# Patient Record
Sex: FEMALE | Race: WHITE | NOT HISPANIC OR LATINO | Employment: OTHER | URBAN - NONMETROPOLITAN AREA
[De-identification: names, ages, dates, MRNs, and addresses within clinical notes are randomized per-mention and may not be internally consistent; named-entity substitution may affect disease eponyms.]

---

## 2017-02-06 ENCOUNTER — APPOINTMENT (OUTPATIENT)
Dept: LAB | Facility: HOSPITAL | Age: 82
End: 2017-02-06
Payer: MEDICARE

## 2017-02-06 ENCOUNTER — TRANSCRIBE ORDERS (OUTPATIENT)
Dept: ADMINISTRATIVE | Facility: HOSPITAL | Age: 82
End: 2017-02-06

## 2017-02-06 DIAGNOSIS — N18.9 CHRONIC KIDNEY DISEASE: ICD-10-CM

## 2017-02-06 DIAGNOSIS — I10 ESSENTIAL (PRIMARY) HYPERTENSION: ICD-10-CM

## 2017-02-06 DIAGNOSIS — J44.9 CHRONIC OBSTRUCTIVE PULMONARY DISEASE (HCC): ICD-10-CM

## 2017-02-06 DIAGNOSIS — E03.9 HYPOTHYROIDISM: ICD-10-CM

## 2017-02-06 DIAGNOSIS — R10.12 LEFT UPPER QUADRANT PAIN: ICD-10-CM

## 2017-02-06 DIAGNOSIS — E87.1 HYPO-OSMOLALITY AND HYPONATREMIA: ICD-10-CM

## 2017-02-06 LAB
ALBUMIN SERPL BCP-MCNC: 3.8 G/DL (ref 3.5–5)
ALP SERPL-CCNC: 53 U/L (ref 46–116)
ALT SERPL W P-5'-P-CCNC: 22 U/L (ref 12–78)
ANION GAP SERPL CALCULATED.3IONS-SCNC: 8 MMOL/L (ref 4–13)
AST SERPL W P-5'-P-CCNC: 26 U/L (ref 5–45)
BASOPHILS # BLD AUTO: 0.05 THOUSANDS/ΜL (ref 0–0.1)
BASOPHILS NFR BLD AUTO: 0 % (ref 0–1)
BILIRUB SERPL-MCNC: 0.5 MG/DL (ref 0.2–1)
BUN SERPL-MCNC: 26 MG/DL (ref 5–25)
CALCIUM SERPL-MCNC: 9.2 MG/DL (ref 8.3–10.1)
CHLORIDE SERPL-SCNC: 93 MMOL/L (ref 100–108)
CHOLEST SERPL-MCNC: 261 MG/DL (ref 50–200)
CO2 SERPL-SCNC: 31 MMOL/L (ref 21–32)
CREAT SERPL-MCNC: 1.18 MG/DL (ref 0.6–1.3)
EOSINOPHIL # BLD AUTO: 0.16 THOUSAND/ΜL (ref 0–0.61)
EOSINOPHIL NFR BLD AUTO: 1 % (ref 0–6)
ERYTHROCYTE [DISTWIDTH] IN BLOOD BY AUTOMATED COUNT: 11.7 % (ref 11.6–15.1)
GFR SERPL CREATININE-BSD FRML MDRD: 43.7 ML/MIN/1.73SQ M
GLUCOSE SERPL-MCNC: 78 MG/DL (ref 65–140)
HCT VFR BLD AUTO: 38.8 % (ref 34.8–46.1)
HDLC SERPL-MCNC: 95 MG/DL (ref 40–60)
HGB BLD-MCNC: 13.2 G/DL (ref 11.5–15.4)
LDLC SERPL CALC-MCNC: 136 MG/DL (ref 0–100)
LYMPHOCYTES # BLD AUTO: 10.52 THOUSANDS/ΜL (ref 0.6–4.47)
LYMPHOCYTES NFR BLD AUTO: 70 % (ref 14–44)
MCH RBC QN AUTO: 30.8 PG (ref 26.8–34.3)
MCHC RBC AUTO-ENTMCNC: 34 G/DL (ref 31.4–37.4)
MCV RBC AUTO: 90 FL (ref 82–98)
MONOCYTES # BLD AUTO: 0.6 THOUSAND/ΜL (ref 0.17–1.22)
MONOCYTES NFR BLD AUTO: 4 % (ref 4–12)
NEUTROPHILS # BLD AUTO: 3.77 THOUSANDS/ΜL (ref 1.85–7.62)
NEUTS SEG NFR BLD AUTO: 25 % (ref 43–75)
PLATELET # BLD AUTO: 235 THOUSANDS/UL (ref 149–390)
PMV BLD AUTO: 11.5 FL (ref 8.9–12.7)
POTASSIUM SERPL-SCNC: 3.9 MMOL/L (ref 3.5–5.3)
PROT SERPL-MCNC: 7 G/DL (ref 6.4–8.2)
RBC # BLD AUTO: 4.29 MILLION/UL (ref 3.81–5.12)
SODIUM SERPL-SCNC: 132 MMOL/L (ref 136–145)
TRIGL SERPL-MCNC: 152 MG/DL
TSH SERPL DL<=0.05 MIU/L-ACNC: 0.74 UIU/ML (ref 0.36–3.74)
WBC # BLD AUTO: 15.1 THOUSAND/UL (ref 4.31–10.16)

## 2017-02-06 PROCEDURE — 80053 COMPREHEN METABOLIC PANEL: CPT

## 2017-02-06 PROCEDURE — 85025 COMPLETE CBC W/AUTO DIFF WBC: CPT

## 2017-02-06 PROCEDURE — 80061 LIPID PANEL: CPT

## 2017-02-06 PROCEDURE — 36415 COLL VENOUS BLD VENIPUNCTURE: CPT

## 2017-02-06 PROCEDURE — 84443 ASSAY THYROID STIM HORMONE: CPT

## 2017-02-07 ENCOUNTER — ALLSCRIPTS OFFICE VISIT (OUTPATIENT)
Dept: OTHER | Facility: OTHER | Age: 82
End: 2017-02-07

## 2017-02-07 DIAGNOSIS — R53.83 OTHER FATIGUE: ICD-10-CM

## 2017-02-07 DIAGNOSIS — N18.9 CHRONIC KIDNEY DISEASE: ICD-10-CM

## 2017-02-07 DIAGNOSIS — E03.9 HYPOTHYROIDISM: ICD-10-CM

## 2017-02-07 DIAGNOSIS — I10 ESSENTIAL (PRIMARY) HYPERTENSION: ICD-10-CM

## 2017-02-07 DIAGNOSIS — M48.00 SPINAL STENOSIS: ICD-10-CM

## 2017-02-07 DIAGNOSIS — M19.90 OSTEOARTHRITIS: ICD-10-CM

## 2017-02-07 DIAGNOSIS — M41.9 SCOLIOSIS: ICD-10-CM

## 2017-02-07 DIAGNOSIS — I27.29 OTHER SECONDARY PULMONARY HYPERTENSION (HCC): ICD-10-CM

## 2017-02-07 DIAGNOSIS — E78.5 HYPERLIPIDEMIA: ICD-10-CM

## 2017-03-23 ENCOUNTER — ALLSCRIPTS OFFICE VISIT (OUTPATIENT)
Dept: OTHER | Facility: OTHER | Age: 82
End: 2017-03-23

## 2017-04-04 ENCOUNTER — APPOINTMENT (OUTPATIENT)
Dept: LAB | Facility: HOSPITAL | Age: 82
End: 2017-04-04
Attending: INTERNAL MEDICINE
Payer: MEDICARE

## 2017-04-04 ENCOUNTER — TRANSCRIBE ORDERS (OUTPATIENT)
Dept: ADMINISTRATIVE | Facility: HOSPITAL | Age: 82
End: 2017-04-04

## 2017-04-04 DIAGNOSIS — C91.10 LEUKEMIA, LYMPHOCYTIC, CHRONIC (HCC): Primary | ICD-10-CM

## 2017-04-04 DIAGNOSIS — C91.10 LEUKEMIA, LYMPHOCYTIC, CHRONIC (HCC): ICD-10-CM

## 2017-04-04 LAB
ALBUMIN SERPL BCP-MCNC: 3.8 G/DL (ref 3.5–5)
ALP SERPL-CCNC: 50 U/L (ref 46–116)
ALT SERPL W P-5'-P-CCNC: 25 U/L (ref 12–78)
ANION GAP SERPL CALCULATED.3IONS-SCNC: 9 MMOL/L (ref 4–13)
AST SERPL W P-5'-P-CCNC: 26 U/L (ref 5–45)
BASOPHILS # BLD MANUAL: 0 THOUSAND/UL (ref 0–0.1)
BASOPHILS NFR MAR MANUAL: 0 % (ref 0–1)
BILIRUB SERPL-MCNC: 0.4 MG/DL (ref 0.2–1)
BUN SERPL-MCNC: 28 MG/DL (ref 5–25)
CALCIUM SERPL-MCNC: 9.3 MG/DL (ref 8.3–10.1)
CHLORIDE SERPL-SCNC: 95 MMOL/L (ref 100–108)
CO2 SERPL-SCNC: 31 MMOL/L (ref 21–32)
CREAT SERPL-MCNC: 1.42 MG/DL (ref 0.6–1.3)
EOSINOPHIL # BLD MANUAL: 0.56 THOUSAND/UL (ref 0–0.4)
EOSINOPHIL NFR BLD MANUAL: 4 % (ref 0–6)
ERYTHROCYTE [DISTWIDTH] IN BLOOD BY AUTOMATED COUNT: 12.1 % (ref 11.6–15.1)
GFR SERPL CREATININE-BSD FRML MDRD: 35.3 ML/MIN/1.73SQ M
GLUCOSE P FAST SERPL-MCNC: 77 MG/DL (ref 65–99)
HCT VFR BLD AUTO: 37.2 % (ref 34.8–46.1)
HGB BLD-MCNC: 12.7 G/DL (ref 11.5–15.4)
LYMPHOCYTES # BLD AUTO: 34 % (ref 14–44)
LYMPHOCYTES # BLD AUTO: 4.77 THOUSAND/UL (ref 0.6–4.47)
MCH RBC QN AUTO: 30.9 PG (ref 26.8–34.3)
MCHC RBC AUTO-ENTMCNC: 34.1 G/DL (ref 31.4–37.4)
MCV RBC AUTO: 91 FL (ref 82–98)
MONOCYTES # BLD AUTO: 0.56 THOUSAND/UL (ref 0–1.22)
MONOCYTES NFR BLD: 4 % (ref 4–12)
NEUTROPHILS # BLD MANUAL: 7.43 THOUSAND/UL (ref 1.85–7.62)
NEUTS SEG NFR BLD AUTO: 53 % (ref 43–75)
PLATELET # BLD AUTO: 219 THOUSANDS/UL (ref 149–390)
PLATELET BLD QL SMEAR: ADEQUATE
PMV BLD AUTO: 11.4 FL (ref 8.9–12.7)
POTASSIUM SERPL-SCNC: 3.8 MMOL/L (ref 3.5–5.3)
PROT SERPL-MCNC: 7 G/DL (ref 6.4–8.2)
RBC # BLD AUTO: 4.11 MILLION/UL (ref 3.81–5.12)
SMUDGE CELLS BLD QL SMEAR: PRESENT
SODIUM SERPL-SCNC: 135 MMOL/L (ref 136–145)
TOTAL CELLS COUNTED SPEC: 100
VARIANT LYMPHS # BLD AUTO: 5 %
WBC # BLD AUTO: 14.02 THOUSAND/UL (ref 4.31–10.16)

## 2017-04-04 PROCEDURE — 85007 BL SMEAR W/DIFF WBC COUNT: CPT

## 2017-04-04 PROCEDURE — 80053 COMPREHEN METABOLIC PANEL: CPT

## 2017-04-04 PROCEDURE — 85027 COMPLETE CBC AUTOMATED: CPT

## 2017-04-04 PROCEDURE — 36415 COLL VENOUS BLD VENIPUNCTURE: CPT

## 2017-04-07 ENCOUNTER — ALLSCRIPTS OFFICE VISIT (OUTPATIENT)
Dept: OTHER | Facility: OTHER | Age: 82
End: 2017-04-07

## 2017-04-07 DIAGNOSIS — C91.10 CHRONIC LYMPHOCYTIC LEUKEMIA OF B-CELL TYPE NOT HAVING ACHIEVED REMISSION (HCC): ICD-10-CM

## 2017-04-13 ENCOUNTER — GENERIC CONVERSION - ENCOUNTER (OUTPATIENT)
Dept: OTHER | Facility: OTHER | Age: 82
End: 2017-04-13

## 2017-04-25 ENCOUNTER — GENERIC CONVERSION - ENCOUNTER (OUTPATIENT)
Dept: OTHER | Facility: OTHER | Age: 82
End: 2017-04-25

## 2017-05-08 ENCOUNTER — APPOINTMENT (OUTPATIENT)
Dept: LAB | Facility: HOSPITAL | Age: 82
End: 2017-05-08
Payer: MEDICARE

## 2017-05-08 ENCOUNTER — TRANSCRIBE ORDERS (OUTPATIENT)
Dept: ADMINISTRATIVE | Facility: HOSPITAL | Age: 82
End: 2017-05-08

## 2017-05-08 ENCOUNTER — GENERIC CONVERSION - ENCOUNTER (OUTPATIENT)
Dept: OTHER | Facility: OTHER | Age: 82
End: 2017-05-08

## 2017-05-08 DIAGNOSIS — N18.9 CHRONIC KIDNEY DISEASE: ICD-10-CM

## 2017-05-08 DIAGNOSIS — E78.5 HYPERLIPIDEMIA: ICD-10-CM

## 2017-05-08 DIAGNOSIS — E03.9 HYPOTHYROIDISM: ICD-10-CM

## 2017-05-08 DIAGNOSIS — I10 ESSENTIAL (PRIMARY) HYPERTENSION: ICD-10-CM

## 2017-05-08 DIAGNOSIS — R53.83 OTHER FATIGUE: ICD-10-CM

## 2017-05-08 DIAGNOSIS — M19.90 OSTEOARTHRITIS: ICD-10-CM

## 2017-05-08 DIAGNOSIS — M48.00 SPINAL STENOSIS: ICD-10-CM

## 2017-05-08 DIAGNOSIS — I27.29 OTHER SECONDARY PULMONARY HYPERTENSION (HCC): ICD-10-CM

## 2017-05-08 DIAGNOSIS — M41.9 SCOLIOSIS: ICD-10-CM

## 2017-05-08 LAB
ALBUMIN SERPL BCP-MCNC: 4 G/DL (ref 3.5–5)
ALP SERPL-CCNC: 51 U/L (ref 46–116)
ALT SERPL W P-5'-P-CCNC: 23 U/L (ref 12–78)
ANION GAP SERPL CALCULATED.3IONS-SCNC: 8 MMOL/L (ref 4–13)
AST SERPL W P-5'-P-CCNC: 26 U/L (ref 5–45)
BASOPHILS # BLD MANUAL: 0 THOUSAND/UL (ref 0–0.1)
BASOPHILS NFR MAR MANUAL: 0 % (ref 0–1)
BILIRUB SERPL-MCNC: 0.5 MG/DL (ref 0.2–1)
BUN SERPL-MCNC: 24 MG/DL (ref 5–25)
CALCIUM SERPL-MCNC: 9.3 MG/DL (ref 8.3–10.1)
CHLORIDE SERPL-SCNC: 92 MMOL/L (ref 100–108)
CHOLEST SERPL-MCNC: 256 MG/DL (ref 50–200)
CO2 SERPL-SCNC: 31 MMOL/L (ref 21–32)
CREAT SERPL-MCNC: 1.17 MG/DL (ref 0.6–1.3)
EOSINOPHIL # BLD MANUAL: 0.31 THOUSAND/UL (ref 0–0.4)
EOSINOPHIL NFR BLD MANUAL: 2 % (ref 0–6)
ERYTHROCYTE [DISTWIDTH] IN BLOOD BY AUTOMATED COUNT: 12.1 % (ref 11.6–15.1)
GFR SERPL CREATININE-BSD FRML MDRD: 44.2 ML/MIN/1.73SQ M
GLUCOSE P FAST SERPL-MCNC: 78 MG/DL (ref 65–99)
HCT VFR BLD AUTO: 38.7 % (ref 34.8–46.1)
HDLC SERPL-MCNC: 91 MG/DL (ref 40–60)
HGB BLD-MCNC: 12.7 G/DL (ref 11.5–15.4)
LDLC SERPL CALC-MCNC: 118 MG/DL (ref 0–100)
LYMPHOCYTES # BLD AUTO: 51 % (ref 14–44)
LYMPHOCYTES # BLD AUTO: 7.92 THOUSAND/UL (ref 0.6–4.47)
MCH RBC QN AUTO: 29.7 PG (ref 26.8–34.3)
MCHC RBC AUTO-ENTMCNC: 32.8 G/DL (ref 31.4–37.4)
MCV RBC AUTO: 90 FL (ref 82–98)
MONOCYTES # BLD AUTO: 0.62 THOUSAND/UL (ref 0–1.22)
MONOCYTES NFR BLD: 4 % (ref 4–12)
NEUTROPHILS # BLD MANUAL: 5.74 THOUSAND/UL (ref 1.85–7.62)
NEUTS SEG NFR BLD AUTO: 37 % (ref 43–75)
PLATELET # BLD AUTO: 254 THOUSANDS/UL (ref 149–390)
PLATELET BLD QL SMEAR: ADEQUATE
PMV BLD AUTO: 11.3 FL (ref 8.9–12.7)
POTASSIUM SERPL-SCNC: 3.4 MMOL/L (ref 3.5–5.3)
PROT SERPL-MCNC: 7.3 G/DL (ref 6.4–8.2)
RBC # BLD AUTO: 4.28 MILLION/UL (ref 3.81–5.12)
SMUDGE CELLS BLD QL SMEAR: PRESENT
SODIUM SERPL-SCNC: 131 MMOL/L (ref 136–145)
TOTAL CELLS COUNTED SPEC: 100
TRIGL SERPL-MCNC: 234 MG/DL
TSH SERPL DL<=0.05 MIU/L-ACNC: 0.8 UIU/ML (ref 0.36–3.74)
VARIANT LYMPHS # BLD AUTO: 6 %
WBC # BLD AUTO: 15.52 THOUSAND/UL (ref 4.31–10.16)

## 2017-05-08 PROCEDURE — 84443 ASSAY THYROID STIM HORMONE: CPT

## 2017-05-08 PROCEDURE — 80053 COMPREHEN METABOLIC PANEL: CPT

## 2017-05-08 PROCEDURE — 85027 COMPLETE CBC AUTOMATED: CPT

## 2017-05-08 PROCEDURE — 80061 LIPID PANEL: CPT

## 2017-05-08 PROCEDURE — 85007 BL SMEAR W/DIFF WBC COUNT: CPT

## 2017-05-08 PROCEDURE — 36415 COLL VENOUS BLD VENIPUNCTURE: CPT

## 2017-05-09 ENCOUNTER — ALLSCRIPTS OFFICE VISIT (OUTPATIENT)
Dept: OTHER | Facility: OTHER | Age: 82
End: 2017-05-09

## 2017-05-09 DIAGNOSIS — N18.9 CHRONIC KIDNEY DISEASE: ICD-10-CM

## 2017-05-09 DIAGNOSIS — M48.061 SPINAL STENOSIS OF LUMBAR REGION: ICD-10-CM

## 2017-05-09 DIAGNOSIS — E87.6 HYPOKALEMIA: ICD-10-CM

## 2017-05-09 DIAGNOSIS — E03.9 HYPOTHYROIDISM: ICD-10-CM

## 2017-05-09 DIAGNOSIS — M47.812 SPONDYLOSIS OF CERVICAL REGION WITHOUT MYELOPATHY OR RADICULOPATHY: ICD-10-CM

## 2017-05-09 DIAGNOSIS — I27.29 OTHER SECONDARY PULMONARY HYPERTENSION (HCC): ICD-10-CM

## 2017-05-09 DIAGNOSIS — I10 ESSENTIAL (PRIMARY) HYPERTENSION: ICD-10-CM

## 2017-05-09 DIAGNOSIS — E78.5 HYPERLIPIDEMIA: ICD-10-CM

## 2017-05-09 DIAGNOSIS — I08.3 COMBINED RHEUMATIC DISORDERS OF MITRAL, AORTIC AND TRICUSPID VALVES: ICD-10-CM

## 2017-06-05 ENCOUNTER — HOSPITAL ENCOUNTER (OUTPATIENT)
Dept: NON INVASIVE DIAGNOSTICS | Facility: HOSPITAL | Age: 82
Discharge: HOME | End: 2017-06-05
Attending: INTERNAL MEDICINE
Payer: MEDICARE

## 2017-06-05 ENCOUNTER — GENERIC CONVERSION - ENCOUNTER (OUTPATIENT)
Dept: OTHER | Facility: OTHER | Age: 82
End: 2017-06-05

## 2017-06-05 DIAGNOSIS — I08.3 COMBINED RHEUMATIC DISORDERS OF MITRAL, AORTIC AND TRICUSPID VALVES: ICD-10-CM

## 2017-06-05 DIAGNOSIS — E78.5 HYPERLIPIDEMIA: ICD-10-CM

## 2017-06-05 DIAGNOSIS — I27.29 OTHER SECONDARY PULMONARY HYPERTENSION (HCC): ICD-10-CM

## 2017-06-05 PROCEDURE — 93306 TTE W/DOPPLER COMPLETE: CPT

## 2017-06-21 ENCOUNTER — GENERIC CONVERSION - ENCOUNTER (OUTPATIENT)
Dept: OTHER | Facility: OTHER | Age: 82
End: 2017-06-21

## 2017-06-30 ENCOUNTER — GENERIC CONVERSION - ENCOUNTER (OUTPATIENT)
Dept: OTHER | Facility: OTHER | Age: 82
End: 2017-06-30

## 2017-07-13 ENCOUNTER — GENERIC CONVERSION - ENCOUNTER (OUTPATIENT)
Dept: OTHER | Facility: OTHER | Age: 82
End: 2017-07-13

## 2017-07-17 ENCOUNTER — GENERIC CONVERSION - ENCOUNTER (OUTPATIENT)
Dept: OTHER | Facility: OTHER | Age: 82
End: 2017-07-17

## 2017-08-09 ENCOUNTER — TRANSCRIBE ORDERS (OUTPATIENT)
Dept: ADMINISTRATIVE | Facility: HOSPITAL | Age: 82
End: 2017-08-09

## 2017-08-09 ENCOUNTER — APPOINTMENT (OUTPATIENT)
Dept: LAB | Facility: HOSPITAL | Age: 82
End: 2017-08-09
Payer: MEDICARE

## 2017-08-09 DIAGNOSIS — E78.5 HYPERLIPIDEMIA: ICD-10-CM

## 2017-08-09 DIAGNOSIS — E03.9 HYPOTHYROIDISM: ICD-10-CM

## 2017-08-09 DIAGNOSIS — M48.061 SPINAL STENOSIS OF LUMBAR REGION: ICD-10-CM

## 2017-08-09 DIAGNOSIS — E87.6 HYPOKALEMIA: ICD-10-CM

## 2017-08-09 DIAGNOSIS — I10 ESSENTIAL (PRIMARY) HYPERTENSION: ICD-10-CM

## 2017-08-09 DIAGNOSIS — M47.812 SPONDYLOSIS OF CERVICAL REGION WITHOUT MYELOPATHY OR RADICULOPATHY: ICD-10-CM

## 2017-08-09 DIAGNOSIS — N18.9 CHRONIC KIDNEY DISEASE: ICD-10-CM

## 2017-08-09 LAB
ALBUMIN SERPL BCP-MCNC: 3.7 G/DL (ref 3.5–5)
ALP SERPL-CCNC: 49 U/L (ref 46–116)
ALT SERPL W P-5'-P-CCNC: 26 U/L (ref 12–78)
ANION GAP SERPL CALCULATED.3IONS-SCNC: 9 MMOL/L (ref 4–13)
AST SERPL W P-5'-P-CCNC: 28 U/L (ref 5–45)
BASOPHILS # BLD MANUAL: 0 THOUSAND/UL (ref 0–0.1)
BASOPHILS NFR MAR MANUAL: 0 % (ref 0–1)
BILIRUB SERPL-MCNC: 0.4 MG/DL (ref 0.2–1)
BUN SERPL-MCNC: 35 MG/DL (ref 5–25)
CALCIUM SERPL-MCNC: 9.4 MG/DL (ref 8.3–10.1)
CHLORIDE SERPL-SCNC: 95 MMOL/L (ref 100–108)
CHOLEST SERPL-MCNC: 252 MG/DL (ref 50–200)
CO2 SERPL-SCNC: 29 MMOL/L (ref 21–32)
CREAT SERPL-MCNC: 1.26 MG/DL (ref 0.6–1.3)
EOSINOPHIL # BLD MANUAL: 0 THOUSAND/UL (ref 0–0.4)
EOSINOPHIL NFR BLD MANUAL: 0 % (ref 0–6)
ERYTHROCYTE [DISTWIDTH] IN BLOOD BY AUTOMATED COUNT: 12.4 % (ref 11.6–15.1)
GFR SERPL CREATININE-BSD FRML MDRD: 39 ML/MIN/1.73SQ M
GLUCOSE P FAST SERPL-MCNC: 71 MG/DL (ref 65–99)
HCT VFR BLD AUTO: 36.9 % (ref 34.8–46.1)
HDLC SERPL-MCNC: 90 MG/DL (ref 40–60)
HGB BLD-MCNC: 12.5 G/DL (ref 11.5–15.4)
LDLC SERPL CALC-MCNC: 126 MG/DL (ref 0–100)
LYMPHOCYTES # BLD AUTO: 49 % (ref 14–44)
LYMPHOCYTES # BLD AUTO: 6.56 THOUSAND/UL (ref 0.6–4.47)
MCH RBC QN AUTO: 30.5 PG (ref 26.8–34.3)
MCHC RBC AUTO-ENTMCNC: 33.9 G/DL (ref 31.4–37.4)
MCV RBC AUTO: 90 FL (ref 82–98)
MONOCYTES # BLD AUTO: 0.54 THOUSAND/UL (ref 0–1.22)
MONOCYTES NFR BLD: 4 % (ref 4–12)
NEUTROPHILS # BLD MANUAL: 6.29 THOUSAND/UL (ref 1.85–7.62)
NEUTS SEG NFR BLD AUTO: 47 % (ref 43–75)
PLATELET # BLD AUTO: 243 THOUSANDS/UL (ref 149–390)
PLATELET BLD QL SMEAR: ADEQUATE
PMV BLD AUTO: 11.2 FL (ref 8.9–12.7)
POTASSIUM SERPL-SCNC: 4.2 MMOL/L (ref 3.5–5.3)
PROT SERPL-MCNC: 7.2 G/DL (ref 6.4–8.2)
RBC # BLD AUTO: 4.1 MILLION/UL (ref 3.81–5.12)
SMUDGE CELLS BLD QL SMEAR: PRESENT
SODIUM SERPL-SCNC: 133 MMOL/L (ref 136–145)
TOTAL CELLS COUNTED SPEC: 100
TRIGL SERPL-MCNC: 178 MG/DL
TSH SERPL DL<=0.05 MIU/L-ACNC: 0.64 UIU/ML (ref 0.36–3.74)
WBC # BLD AUTO: 13.38 THOUSAND/UL (ref 4.31–10.16)

## 2017-08-09 PROCEDURE — 84443 ASSAY THYROID STIM HORMONE: CPT

## 2017-08-09 PROCEDURE — 80061 LIPID PANEL: CPT

## 2017-08-09 PROCEDURE — 80053 COMPREHEN METABOLIC PANEL: CPT

## 2017-08-09 PROCEDURE — 85027 COMPLETE CBC AUTOMATED: CPT

## 2017-08-09 PROCEDURE — 85007 BL SMEAR W/DIFF WBC COUNT: CPT

## 2017-08-09 PROCEDURE — 36415 COLL VENOUS BLD VENIPUNCTURE: CPT

## 2017-08-10 ENCOUNTER — GENERIC CONVERSION - ENCOUNTER (OUTPATIENT)
Dept: OTHER | Facility: OTHER | Age: 82
End: 2017-08-10

## 2017-08-10 DIAGNOSIS — E78.5 HYPERLIPIDEMIA: ICD-10-CM

## 2017-08-10 DIAGNOSIS — I10 ESSENTIAL (PRIMARY) HYPERTENSION: ICD-10-CM

## 2017-08-10 DIAGNOSIS — I27.29 OTHER SECONDARY PULMONARY HYPERTENSION (HCC): ICD-10-CM

## 2017-08-10 DIAGNOSIS — J44.9 CHRONIC OBSTRUCTIVE PULMONARY DISEASE (HCC): ICD-10-CM

## 2017-08-10 DIAGNOSIS — N18.9 CHRONIC KIDNEY DISEASE: ICD-10-CM

## 2017-09-21 ENCOUNTER — ALLSCRIPTS OFFICE VISIT (OUTPATIENT)
Dept: OTHER | Facility: OTHER | Age: 82
End: 2017-09-21

## 2017-10-04 ENCOUNTER — APPOINTMENT (OUTPATIENT)
Dept: LAB | Facility: HOSPITAL | Age: 82
End: 2017-10-04
Attending: INTERNAL MEDICINE
Payer: MEDICARE

## 2017-10-04 ENCOUNTER — TRANSCRIBE ORDERS (OUTPATIENT)
Dept: ADMINISTRATIVE | Facility: HOSPITAL | Age: 82
End: 2017-10-04

## 2017-10-04 DIAGNOSIS — C91.10 CHRONIC LYMPHOCYTIC LEUKEMIA OF B-CELL TYPE NOT HAVING ACHIEVED REMISSION (HCC): ICD-10-CM

## 2017-10-04 LAB
ALBUMIN SERPL BCP-MCNC: 3.9 G/DL (ref 3.5–5)
ALP SERPL-CCNC: 56 U/L (ref 46–116)
ALT SERPL W P-5'-P-CCNC: 24 U/L (ref 12–78)
ANION GAP SERPL CALCULATED.3IONS-SCNC: 11 MMOL/L (ref 4–13)
ANISOCYTOSIS BLD QL SMEAR: PRESENT
AST SERPL W P-5'-P-CCNC: 30 U/L (ref 5–45)
BASOPHILS # BLD MANUAL: 0 THOUSAND/UL (ref 0–0.1)
BASOPHILS NFR MAR MANUAL: 0 % (ref 0–1)
BILIRUB SERPL-MCNC: 0.4 MG/DL (ref 0.2–1)
BUN SERPL-MCNC: 23 MG/DL (ref 5–25)
CALCIUM SERPL-MCNC: 9.3 MG/DL (ref 8.3–10.1)
CHLORIDE SERPL-SCNC: 91 MMOL/L (ref 100–108)
CO2 SERPL-SCNC: 28 MMOL/L (ref 21–32)
CREAT SERPL-MCNC: 1.1 MG/DL (ref 0.6–1.3)
EOSINOPHIL # BLD MANUAL: 0.14 THOUSAND/UL (ref 0–0.4)
EOSINOPHIL NFR BLD MANUAL: 1 % (ref 0–6)
ERYTHROCYTE [DISTWIDTH] IN BLOOD BY AUTOMATED COUNT: 11.9 % (ref 11.6–15.1)
GFR SERPL CREATININE-BSD FRML MDRD: 46 ML/MIN/1.73SQ M
GLUCOSE P FAST SERPL-MCNC: 77 MG/DL (ref 65–99)
HCT VFR BLD AUTO: 38.4 % (ref 34.8–46.1)
HGB BLD-MCNC: 13.1 G/DL (ref 11.5–15.4)
LYMPHOCYTES # BLD AUTO: 39 % (ref 14–44)
LYMPHOCYTES # BLD AUTO: 5.51 THOUSAND/UL (ref 0.6–4.47)
MCH RBC QN AUTO: 30 PG (ref 26.8–34.3)
MCHC RBC AUTO-ENTMCNC: 34.1 G/DL (ref 31.4–37.4)
MCV RBC AUTO: 88 FL (ref 82–98)
MONOCYTES # BLD AUTO: 0.28 THOUSAND/UL (ref 0–1.22)
MONOCYTES NFR BLD: 2 % (ref 4–12)
NEUTROPHILS # BLD MANUAL: 7.07 THOUSAND/UL (ref 1.85–7.62)
NEUTS SEG NFR BLD AUTO: 50 % (ref 43–75)
PLATELET # BLD AUTO: 225 THOUSANDS/UL (ref 149–390)
PLATELET BLD QL SMEAR: ADEQUATE
PMV BLD AUTO: 11.1 FL (ref 8.9–12.7)
POTASSIUM SERPL-SCNC: 4 MMOL/L (ref 3.5–5.3)
PROT SERPL-MCNC: 7.3 G/DL (ref 6.4–8.2)
RBC # BLD AUTO: 4.37 MILLION/UL (ref 3.81–5.12)
SMUDGE CELLS BLD QL SMEAR: PRESENT
SODIUM SERPL-SCNC: 130 MMOL/L (ref 136–145)
TOTAL CELLS COUNTED SPEC: 100
VARIANT LYMPHS # BLD AUTO: 8 %
WBC # BLD AUTO: 14.14 THOUSAND/UL (ref 4.31–10.16)

## 2017-10-04 PROCEDURE — 36415 COLL VENOUS BLD VENIPUNCTURE: CPT

## 2017-10-04 PROCEDURE — 85027 COMPLETE CBC AUTOMATED: CPT

## 2017-10-04 PROCEDURE — 85007 BL SMEAR W/DIFF WBC COUNT: CPT

## 2017-10-04 PROCEDURE — 80053 COMPREHEN METABOLIC PANEL: CPT

## 2017-10-06 ENCOUNTER — ALLSCRIPTS OFFICE VISIT (OUTPATIENT)
Dept: OTHER | Facility: OTHER | Age: 82
End: 2017-10-06

## 2017-10-07 NOTE — PROGRESS NOTES
Assessment  1  Chronic lymphocytic leukemia (204 10) (C91 10)    Plan  Chronic lymphocytic leukemia    · Drink plenty of fluids ; Status:Complete;   Done: [id number]   Ordered; For:Chronic lymphocytic leukemia; Ordered By:Makayla Fuchs;   · Follow-up visit in 6 months Evaluation and Treatment  Follow-up  Status: Hold For -  Scheduling  Requested for: 82QWO2920   Ordered; For: Chronic lymphocytic leukemia; Ordered By: Margarita Lazaro Performed:  Due: 15DRK8076   · (1) CBC/PLT/DIFF; Status:Active; Requested for:30Mar2018; Perform:[facility]; TCO:42QTU8973;TTNZZJA; For:Chronic lymphocytic leukemia; Ordered By:Makayla Fuchs;   · (1) COMPREHENSIVE METABOLIC PANEL; Status:Active; Requested for:30Mar2018; Perform:[facility]; DEF:24JMM8945;GIJAHWR; For:Chronic lymphocytic leukemia; Ordered By:Makayla Fuchs;    Discussion/Summary  Discussion Summary:   In summary, this is an 28-year-old female history of CLL as outlined  she has had some problems related to her pulmonary issues but none evident we related to CLL  and platelets are normal  There is no palpable adenopathy or organomegaly  CLL remains at stage 0  Observation remains appropriate  reviewed the above with the patient and her daughter  They voiced understanding and agreement  I will see her back in 6 months for review  Counseling Documentation With Imm: The patient, patient's family was counseled regarding diagnostic results,-instructions for management,-patient and family education,-impressions  total time of encounter was 15 minutes  Chief Complaint  Chief Complaint Free Text Note Form: Follow-up regarding CLL  History of Present Illness  HPI: 2013- diagnosed with CLL  Mild leukocytosis  13 q- (a favorable prognostic feature) noted  Interval History: Had evaluation regarding pulmonary nodules  Eventually found to have pulmonary HTN, right heart failure  Trial of NTG lead to confusion, DC'd med        Review of Systems  Complete-Female: Constitutional: feeling tired, but-No fever, no chills, feels well, no tiredness, no recent weight gain or weight loss  Eyes: No complaints of eye pain, no red eyes, no eyesight problems, no discharge, no dry eyes, no itching of eyes  ENT: no complaints of earache, no loss of hearing, no nose bleeds, no nasal discharge, no sore throat, no hoarseness  Cardiovascular: No complaints of slow heart rate, no fast heart rate, no chest pain, no palpitations, no leg claudication, no lower extremity edema  Respiratory: shortness of breath during exertion-and-has worsened over past 4-6 months  Gastrointestinal: No complaints of abdominal pain, no constipation, no nausea or vomiting, no diarrhea, no bloody stools  Genitourinary: No complaints of dysuria, no incontinence, no pelvic pain, no dysmenorrhea, no vaginal discharge or bleeding  Musculoskeletal: Chronic back pain for years  RA fluctuates quite a bit  Heels and legs hurt  Wake her from sleep  Integumentary: No complaints of skin rash or lesions, no itching, no skin wounds, no breast pain or lump  Neurological: No complaints of headache, no confusion, no convulsions, no numbness, no dizziness or fainting, no tingling, no limb weakness, no difficulty walking  Psychiatric: Not suicidal, no sleep disturbance, no anxiety or depression, no change in personality, no emotional problems  Endocrine: No complaints of proptosis, no hot flashes, no muscle weakness, no deepening of the voice, no feelings of weakness  Hematologic/Lymphatic: No complaints of swollen glands, no swollen glands in the neck, does not bleed easily, does not bruise easily  Active Problems  1  Arthritis (716 90) (M19 90)   2  Cervical osteoarthritis (721 0) (M47 812)   3  Chronic kidney insufficiency (585 9) (N18 9)   4  Chronic lymphocytic leukemia (204 10) (C91 10)   5  Chronic obstructive pulmonary disease (496) (J44 9)   6   Combined disorders of mitral, 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)   7  Dyslipidemia (272 4) (E78 5)   8  Emphysema (492 8) (J43 9)   9  Esophageal reflux (530 81) (K21 9)   10  Hypertension (401 9) (I10)   11  Hypokalemia (276 8) (E87 6)   12  Hyponatremia (276 1) (E87 1)   13  Hypothyroidism (244 9) (E03 9)   14  Lumbar stenosis (724 02) (M48 061)   15  Medicare annual wellness visit, subsequent (V70 0) (Z00 00)   16  Osteoarthritis (715 90) (M19 90)   17  Premature atrial contraction (427 61) (I49 1)   18  Pulmonary artery hypertension (416 8) (I27 21)   19  Scoliosis (737 30) (M41 9)   20  Screening for osteoporosis (V82 81) (Z13 820)   21  Spinal stenosis (724 00) (M48 00)   22  Trochanteric bursitis of both hips (726 5) (M70 61,M70 62)    Past Medical History  1  History of Carpal tunnel syndrome, unspecified laterality (354 0) (G56 00)   2  History of Cubital tunnel syndrome on left (354 2) (G56 22)   3  History of Cubital tunnel syndrome on right (354 2) (G56 21)   4  History of Ectopic heartbeats (427 60) (I49 49)   5  History of Hemoptysis (786 30) (R04 2)   6  History of Malignant melanoma of skin (172 9) (C43 9)   7  History of Near syncope (780 2) (R55)   8  History of Transient ischemic attack (435 9) (G45 9)    Surgical History  1  History of Appendectomy   2  History of Breast Surgery Lumpectomy   3  History of Cataract Surgery   4  History of  Section   5  History of Fusion / Refusion Of Vertebrae   6  History of Hysterectomy   7  History of Laminectomy Lumbar   8  History of Neuroplasty Decompression Median Nerve At Carpal Tunnel   9  History of Oophorectomy   10  History of Thoracoscopy (Therapeutic) With Pleurodesis   11  History of Tonsillectomy    Family History  Mother    1  Family history of Colon Cancer (V16 0)   2  Family history of Hypertension (V17 49)  Father    3  Family history of Heart Disease (V17 49)   4  Family history of Rheumatoid Arthritis  Paternal Aunt    5   Family history of Diabetes Mellitus (V18 0)  Maternal Uncle    6  Family history of Diabetes Mellitus (V18 0)  Family History    7  Family history of Cancer   8  Family history of Stroke Syndrome (V17 1)    Social History   · Being A Social Drinker   · Never A Smoker   ·     Current Meds   1  ALPRAZolam 0 25 MG Oral Tablet; TAKE 1 TABLET twice a day AS NEEDED; Therapy: 43NZT9154 to (Renew:04Oct2016); Last Rx:28Utn9321 Ordered   2  CeleBREX 200 MG Oral Capsule; take 1 capsule daily; Therapy: (Recorded:22Egy4637) to Recorded   3  Co Q-10 100 MG Oral Capsule; Therapy: (Frances Ashby) to Recorded   4  Cranberry CAPS; Therapy: (Recorded:06Oct2017) to Recorded   5  Levothyroxine Sodium 112 MCG Oral Tablet; Take 1 tablet daily as directed; Therapy: 08BQH4089 to (Last Rx:63Smb0584)  Requested for: 28Noh2848 Ordered   6  Losartan Potassium 50 MG Oral Tablet; TAKE ONE-HALF (1/2) TABLET DAILY; Therapy: 06MQF2282 to (Last Rx:37Fks6495)  Requested for: 09Imr2480 Ordered   7  Montelukast Sodium 10 MG Oral Tablet; Take 1 tablet daily; Therapy: 84JCG3995 to (Last Rx:10Nov2016)  Requested for: 94WMQ2665 Ordered   8  Pataday 0 2 % Ophthalmic Solution; Therapy: 43SJP6242 to Recorded   9  Premarin 1 25 MG Oral Tablet; Take 1 tablet daily; Therapy: 29DHC4463 to (Last Rx:15Sor3742)  Requested for: 52Flg6057 Ordered   10  PriLOSEC 20 MG CPDR; Therapy: 36XQJ7792 to Recorded   11  Triamterene-HCTZ 37 5-25 MG Oral Tablet; Take 1 tablet daily as directed; Therapy: 45Eeo5707 to (Last Rx:35Gac2241)  Requested for: 62Ssg8561 Ordered   12  Vigamox 0 5 % Ophthalmic Solution; Therapy: (Recorded:06Oct2017) to Recorded   13  Vitamin B6 TABS; Therapy: (Recorded:20Nov2013) to Recorded   14  Vitamin C 500 MG Oral Tablet; Therapy: (Recorded:20Nov2013) to Recorded   15  Vitamin D3 2000 UNIT Oral Capsule; Therapy: (Recorded:20Nov2013) to Recorded   16  Vitamin E 400 UNIT Oral Tablet; TAKE 1 TABLET DAILY; Therapy: (Recorded:84Hpp6609) to Recorded   17   Zinc Gluconate 15 MG Oral Tablet; TAKE 1 TABLET DAILY; Therapy: (Recorded:36Rld2699) to Recorded    Allergies  1  Aspirin TABS   2  Ativan TABS   3  Betadine   4  Codeine Derivatives   5  Enbrel SOLN   6  Penicillins   7  Povidone SOLN   8  PredniSONE TABS    Vitals  Vital Signs    Recorded: 93RZA5032 02:00PM   Temperature 97 9 F   Heart Rate 75   Respiration 15   Systolic 030   Diastolic 64   Height 4 ft 10 in   Weight 177 lb 5 oz   BMI Calculated 37 06   BSA Calculated 1 73   O2 Saturation 96   Pain Scale 8     Physical Exam    Constitutional   General appearance: No acute distress, well appearing and well nourished  Eyes   Conjunctiva and lids: No swelling, erythema or discharge  Ears, Nose, Mouth, and Throat   External inspection of ears and nose: Normal     Oropharynx: Normal with no erythema, edema, exudate or lesions  Pulmonary   Auscultation of lungs: Clear to auscultation  Cardiovascular   Auscultation of heart: Normal rate and rhythm, normal S1 and S2, without murmurs  Examination of extremities for edema and/or varicosities: Normal     Abdomen   Abdomen: Non-tender, no masses  Liver and spleen: No hepatomegaly or splenomegaly  Lymphatic   Palpation of lymph nodes in neck: No lymphadenopathy  Musculoskeletal   Gait and station: Normal     Skin   Skin and subcutaneous tissue: Normal without rashes or lesions  Neurologic   Cranial nerves: Cranial nerves 2-12 intact      Psychiatric   Orientation to person, place, and time: Normal          Results/Data  (1) COMPREHENSIVE METABOLIC PANEL 61TNI9387 03:78ZS Juan R WesMarshall Order Number: [id number]     Test Name Result Flag Reference   SODIUM 130 mmol/L L 136-145   POTASSIUM 4 0 mmol/L  3 5-5 3   CHLORIDE 91 mmol/L L 100-108   CARBON DIOXIDE 28 mmol/L  21-32   ANION GAP (CALC) 11 mmol/L  4-13   BLOOD UREA NITROGEN 23 mg/dL  5-25   CREATININE 1 10 mg/dL  0 60-1 30   Standardized to IDMS reference method   CALCIUM 9 3 mg/dL 8  3-10 1   BILI, TOTAL 0 40 mg/dL  0 20-1 00   ALK PHOSPHATAS 56 U/L     ALT (SGPT) 24 U/L  12-78   Specimen collection should occur prior to Sulfasalazine administration due to the potential for falsely depressed results  AST(SGOT) 30 U/L  5-45   Specimen collection should occur prior to Sulfasalazine administration due to the potential for falsely depressed results  ALBUMIN 3 9 g/dL  3 5-5 0   TOTAL PROTEIN 7 3 g/dL  6 4-8 2   eGFR 46 ml/min/1 73sq [facility] Disease Education Program recommendations are as follows:  GFR calculation is accurate only with a steady state creatinine  Chronic Kidney disease less than 60 ml/min/1 73 sq  meters  Kidney failure less than 15 ml/min/1 73 sq  meters  GLUCOSE FASTING 77 mg/dL  65-99   Specimen collection should occur prior to Sulfasalazine administration due to the potential for falsely depressed results  Specimen collection should occur prior to Sulfapyridine administration due to the potential for falsely elevated results  Future Appointments    Date/Time Provider Specialty Site   12/07/2017 01:40 PM GERALD George  [address] [place] [address]     Signatures   Electronically signed by : GERALD Odonnell  Oct  6 2017  2:30PM EST                       (Author)

## 2017-12-04 ENCOUNTER — GENERIC CONVERSION - ENCOUNTER (OUTPATIENT)
Dept: OTHER | Facility: OTHER | Age: 82
End: 2017-12-04

## 2017-12-04 ENCOUNTER — TRANSCRIBE ORDERS (OUTPATIENT)
Dept: ADMINISTRATIVE | Facility: HOSPITAL | Age: 82
End: 2017-12-04

## 2017-12-04 ENCOUNTER — APPOINTMENT (OUTPATIENT)
Dept: LAB | Facility: HOSPITAL | Age: 82
End: 2017-12-04
Payer: MEDICARE

## 2017-12-04 DIAGNOSIS — J44.9 CHRONIC OBSTRUCTIVE PULMONARY DISEASE (HCC): ICD-10-CM

## 2017-12-04 DIAGNOSIS — I27.29 OTHER SECONDARY PULMONARY HYPERTENSION (HCC): ICD-10-CM

## 2017-12-04 DIAGNOSIS — I10 ESSENTIAL (PRIMARY) HYPERTENSION: ICD-10-CM

## 2017-12-04 DIAGNOSIS — N18.9 CHRONIC KIDNEY DISEASE: ICD-10-CM

## 2017-12-04 DIAGNOSIS — E78.5 HYPERLIPIDEMIA: ICD-10-CM

## 2017-12-04 LAB
ALBUMIN SERPL BCP-MCNC: 3.6 G/DL (ref 3.5–5)
ALP SERPL-CCNC: 57 U/L (ref 46–116)
ALT SERPL W P-5'-P-CCNC: 26 U/L (ref 12–78)
ANION GAP SERPL CALCULATED.3IONS-SCNC: 8 MMOL/L (ref 4–13)
ANISOCYTOSIS BLD QL SMEAR: PRESENT
AST SERPL W P-5'-P-CCNC: 26 U/L (ref 5–45)
BASOPHILS # BLD MANUAL: 0.16 THOUSAND/UL (ref 0–0.1)
BASOPHILS NFR MAR MANUAL: 1 % (ref 0–1)
BILIRUB SERPL-MCNC: 0.4 MG/DL (ref 0.2–1)
BUN SERPL-MCNC: 26 MG/DL (ref 5–25)
CALCIUM SERPL-MCNC: 8.7 MG/DL (ref 8.3–10.1)
CHLORIDE SERPL-SCNC: 93 MMOL/L (ref 100–108)
CHOLEST SERPL-MCNC: 237 MG/DL (ref 50–200)
CO2 SERPL-SCNC: 30 MMOL/L (ref 21–32)
CREAT SERPL-MCNC: 1.12 MG/DL (ref 0.6–1.3)
EOSINOPHIL # BLD MANUAL: 0.16 THOUSAND/UL (ref 0–0.4)
EOSINOPHIL NFR BLD MANUAL: 1 % (ref 0–6)
ERYTHROCYTE [DISTWIDTH] IN BLOOD BY AUTOMATED COUNT: 12.5 % (ref 11.6–15.1)
GFR SERPL CREATININE-BSD FRML MDRD: 45 ML/MIN/1.73SQ M
GLUCOSE P FAST SERPL-MCNC: 78 MG/DL (ref 65–99)
HCT VFR BLD AUTO: 36.8 % (ref 34.8–46.1)
HDLC SERPL-MCNC: 94 MG/DL (ref 40–60)
HGB BLD-MCNC: 12.6 G/DL (ref 11.5–15.4)
LDLC SERPL CALC-MCNC: 106 MG/DL (ref 0–100)
LYMPHOCYTES # BLD AUTO: 59 % (ref 14–44)
LYMPHOCYTES # BLD AUTO: 9.35 THOUSAND/UL (ref 0.6–4.47)
MCH RBC QN AUTO: 30.4 PG (ref 26.8–34.3)
MCHC RBC AUTO-ENTMCNC: 34.2 G/DL (ref 31.4–37.4)
MCV RBC AUTO: 89 FL (ref 82–98)
MONOCYTES # BLD AUTO: 0.79 THOUSAND/UL (ref 0–1.22)
MONOCYTES NFR BLD: 5 % (ref 4–12)
NEUTROPHILS # BLD MANUAL: 5.39 THOUSAND/UL (ref 1.85–7.62)
NEUTS SEG NFR BLD AUTO: 34 % (ref 43–75)
PLATELET # BLD AUTO: 243 THOUSANDS/UL (ref 149–390)
PLATELET BLD QL SMEAR: ADEQUATE
PMV BLD AUTO: 11.2 FL (ref 8.9–12.7)
POTASSIUM SERPL-SCNC: 3.6 MMOL/L (ref 3.5–5.3)
PROT SERPL-MCNC: 6.8 G/DL (ref 6.4–8.2)
RBC # BLD AUTO: 4.14 MILLION/UL (ref 3.81–5.12)
SODIUM SERPL-SCNC: 131 MMOL/L (ref 136–145)
TOTAL CELLS COUNTED SPEC: 100
TRIGL SERPL-MCNC: 186 MG/DL
TSH SERPL DL<=0.05 MIU/L-ACNC: 0.34 UIU/ML (ref 0.36–3.74)
WBC # BLD AUTO: 15.84 THOUSAND/UL (ref 4.31–10.16)

## 2017-12-04 PROCEDURE — 84443 ASSAY THYROID STIM HORMONE: CPT

## 2017-12-04 PROCEDURE — 85027 COMPLETE CBC AUTOMATED: CPT

## 2017-12-04 PROCEDURE — 36415 COLL VENOUS BLD VENIPUNCTURE: CPT

## 2017-12-04 PROCEDURE — 80053 COMPREHEN METABOLIC PANEL: CPT

## 2017-12-04 PROCEDURE — 80061 LIPID PANEL: CPT

## 2017-12-04 PROCEDURE — 85007 BL SMEAR W/DIFF WBC COUNT: CPT

## 2017-12-07 ENCOUNTER — GENERIC CONVERSION - ENCOUNTER (OUTPATIENT)
Dept: OTHER | Facility: OTHER | Age: 82
End: 2017-12-07

## 2017-12-07 ENCOUNTER — ALLSCRIPTS OFFICE VISIT (OUTPATIENT)
Dept: OTHER | Facility: OTHER | Age: 82
End: 2017-12-07

## 2017-12-07 DIAGNOSIS — E03.9 HYPOTHYROIDISM: ICD-10-CM

## 2017-12-07 DIAGNOSIS — M48.00 SPINAL STENOSIS: ICD-10-CM

## 2017-12-07 DIAGNOSIS — I10 ESSENTIAL (PRIMARY) HYPERTENSION: ICD-10-CM

## 2017-12-07 DIAGNOSIS — E78.5 HYPERLIPIDEMIA: ICD-10-CM

## 2017-12-07 DIAGNOSIS — M47.812 SPONDYLOSIS OF CERVICAL REGION WITHOUT MYELOPATHY OR RADICULOPATHY: ICD-10-CM

## 2017-12-07 DIAGNOSIS — E87.1 HYPO-OSMOLALITY AND HYPONATREMIA (CODE): ICD-10-CM

## 2018-01-11 NOTE — RESULT NOTES
Verified Results  (1) CBC/PLT/DIFF [id number] 02:23PM Kj Rodriguez     Test Name Result Flag Reference   WBC COUNT 11 68 Thousand/uL H 4 31-10 16   RBC COUNT 4 07 Million/uL  3 81-5 12   HEMOGLOBIN 12 4 g/dL  11 5-15 4   HEMATOCRIT 36 8 %  34 8-46  1   MCV 90 fL  82-98   MCH 30 5 pg  26 8-34 3   MCHC 33 7 g/dL  31 4-37 4   RDW 12 3 %  11 6-15 1   MPV 11 5 fL  8 9-12 7   PLATELET COUNT 621 Thousands/uL  149-390   NEUTROPHILS RELATIVE PERCENT 35 % L 43-75   LYMPHOCYTES RELATIVE PERCENT 60 % H 14-44   MONOCYTES RELATIVE PERCENT 3 % L 4-12   EOSINOPHILS RELATIVE PERCENT 2 %  0-6   BASOPHILS RELATIVE PERCENT 0 %  0-1   NEUTROPHILS ABSOLUTE COUNT 4 12 Thousands/µL  1 85-7 62   LYMPHOCYTES ABSOLUTE COUNT 6 95 Thousands/µL H 0 60-4 47   MONOCYTES ABSOLUTE COUNT 0 40 Thousand/µL  0 17-1 22   EOSINOPHILS ABSOLUTE COUNT 0 18 Thousand/µL  0 00-0 61   BASOPHILS ABSOLUTE COUNT 0 03 Thousands/µL  0 00-0 10     (1) COMPREHENSIVE METABOLIC PANEL 38NCT7480 42:60[facility] Disease Education [facility] recommendations are as follows:  GFR calculation is accurate only with a steady state creatinine  Chronic Kidney disease less than 60 ml/min/1 73 sq  meters  Kidney failure less than 15 ml/min/1 73 sq  meters  Test Name Result Flag Reference   GLUCOSE,RANDM 77 mg/dL     If the patient is fasting, the ADA then defines impaired fasting glucose as > 100 mg/dL and diabetes as > or equal to 123 mg/dL     SODIUM 135 mmol/L L 136-145   POTASSIUM 4 0 mmol/L  3 5-5 3   CHLORIDE 97 mmol/L L 100-108   CARBON DIOXIDE 29 mmol/L  21-32   ANION GAP (CALC) 9 mmol/L  4-13   BLOOD UREA NITROGEN 27 mg/dL H 5-25   CREATININE 1 23 mg/dL  0 60-1 30   Standardized to IDMS reference method   CALCIUM 9 0 mg/dL  8 3-10 1   BILI, TOTAL 0 41 mg/dL  0 20-1 00   ALK PHOSPHATAS 52 U/L     ALT (SGPT) 23 U/L  12-78   AST(SGOT) 25 U/L  5-45   ALBUMIN 3 6 g/dL  3 5-5 0   TOTAL PROTEIN 6 8 g/dL  6 4-8 2   eGFR Non- American 41 8 ml/min/1 73sq m       (1) TSH [id number] 02:23PM Wilmon Kathrin   Patients undergoing fluorescein dye angiography may retain small amounts of fluorescein in the body for 48-72 hours post procedure  Samples containing fluorescein can produce falsely depressed TSH values  If the patient had this procedure,a specimen should be resubmitted post fluorescein clearance          The recommended reference ranges for TSH during pregnancy are as follows:  First trimester 0 1 to 2 5 uIU/mL  Second trimester  0 2 to 3 0 uIU/mL  Third trimester 0 3 to 3 0 uIU/m     Test Name Result Flag Reference   TSH 0 771 uIU/mL  0 358-3 740     (1) URIC ACID [id number] 02:23PM Wilmon Kathrin     Test Name Result Flag Reference   URIC ACID 6 0 mg/dL  2 0-6 8     (1) LIPID PANEL, FASTING [id number] 02:23PM Lisbet Small   Triglyceride:         Normal              <150 mg/dl       Borderline High    150-199 mg/dl       High               200-499 mg/dl       Very High          >499 mg/dl  Cholesterol:         Desirable        <200 mg/dl      Borderline High  200-239 mg/dl      High             >239 mg/dl  HDL Cholesterol:        High    >59 mg/dL      Low     <41 mg/dL     Test Name Result Flag Reference   CHOLESTEROL 246 mg/dL H    HDL,DIRECT 78 mg/dL H 40-60   LDL CHOLESTEROL CALCULATED 131 mg/dL H 0-100   TRIGLYCERIDES 186 mg/dL H <=150

## 2018-01-11 NOTE — RESULT NOTES
Verified Results  (1) CBC/PLT/DIFF [id number] 02:05PM Asim Otoole Order Number: [id number]     Test Name Result Flag Reference   WBC COUNT 15 52 Thousand/uL H 4 31-10 16   RBC COUNT 4 28 Million/uL  3 81-5 12   HEMOGLOBIN 12 7 g/dL  11 5-15 4   HEMATOCRIT 38 7 %  34 8-46  1   MCV 90 fL  82-98   MCH 29 7 pg  26 8-34 3   MCHC 32 8 g/dL  31 4-37 4   RDW 12 1 %  11 6-15 1   MPV 11 3 fL  8 9-12 7   PLATELET COUNT 296 Thousands/uL  149-390   - Patient Instructions: This bloodwork is non-fasting  Please drink two glasses of water morning of bloodwork  This is an appended report  These results have been appended to a previously verified report  NEUTROPHILS - REL 37 % L 43-75   LYMPHOCYTES - REL 51 % H 14-44   MONOCYTES - REL 4 %  4-12   EOSINOPHILS - REL 2 %  0-6   BASOPHILS - REL 0 %  0-1   ATYPICAL LYMPH 6 % H <=0   NEUTROPHILS ABS 5 74 Thousand/uL  1 85-7 62   LYMPHOTCYTES ABS 7 92 Thousand/uL H 0 60-4 47   MONOCYTES ABS 0 62 Thousand/uL  0 00-1 22   EOSINOPHILS ABS 0 31 Thousand/uL  0 00-0 40   BASOPHILS ABS 0 00 Thousand/uL  0 00-0 10   TOTAL COUNTED 100     Smudge Cells Present     PLT ESTIMATE Adequate  Adequate   - Patient Instructions: This bloodwork is non-fasting  Please drink two glasses of water morning of bloodwork       (1) COMPREHENSIVE METABOLIC PANEL [id number] 28:86IG Asim Otoole Order Number: [id number]     Test Name Result Flag Reference   SODIUM 131 mmol/L L 136-145   POTASSIUM 3 4 mmol/L L 3 5-5 3   CHLORIDE 92 mmol/L L 100-108   CARBON DIOXIDE 31 mmol/L  21-32   ANION GAP (CALC) 8 mmol/L  4-13   BLOOD UREA NITROGEN 24 mg/dL  5-25   CREATININE 1 17 mg/dL  0 60-1 30   Standardized to IDMS reference method   CALCIUM 9 3 mg/dL  8 3-10 1   BILI, TOTAL 0 50 mg/dL  0 20-1 00   ALK PHOSPHATAS 51 U/L     ALT (SGPT) 23 U/L  12-78   AST(SGOT) 26 U/L  5-45   ALBUMIN 4 0 g/dL  3 5-5 0   TOTAL PROTEIN 7 3 g/dL  6 4-8 2   eGFR Non- 44 2 ml/min/1 73sq [facility] Disease Education Program recommendations are as follows:  GFR calculation is accurate only with a steady state creatinine  Chronic Kidney disease less than 60 ml/min/1 73 sq  meters  Kidney failure less than 15 ml/min/1 73 sq  meters  GLUCOSE FASTING 78 mg/dL  65-99     (1) LIPID PANEL, FASTING [id number] 02:05PM Nida Linares Order Number: [id number]     Test Name Result Flag Reference   CHOLESTEROL 256 mg/dL H    HDL,DIRECT 91 mg/dL H 40-60   Specimen collection should occur prior to Metamizole administration due to the potential for falsely depressed results  LDL CHOLESTEROL CALCULATED 118 mg/dL H 0-100   - Patient Instructions: This is a fasting blood test  Water,black tea or black  coffee only after 9:00pm the night before test   Drink 2 glasses of water the morning of test       Triglyceride:         Normal              <150 mg/dl       Borderline High    150-199 mg/dl       High               200-499 mg/dl       Very High          >499 mg/dl  Cholesterol:         Desirable        <200 mg/dl      Borderline High  200-239 mg/dl      High             >239 mg/dl  HDL Cholesterol:        High    >59 mg/dL      Low     <41 mg/dL  LDL CALCULATED:    This screening LDL is a calculated result  It does not have the accuracy of the Direct Measured LDL in the monitoring of patients with hyperlipidemia and/or statin therapy  Direct Measure LDL (SXG976) must be ordered separately in these patients  TRIGLYCERIDES 234 mg/dL H <=150   Specimen collection should occur prior to N-Acetylcysteine or Metamizole administration due to the potential for falsely depressed results  (1) TSH [id number] 02:05PM Nida Linares Order Number: [id number]     Test Name Result Flag Reference   TSH 0 799 uIU/mL  0 358-3 740   - Patient Instructions: This bloodwork is non-fasting  Please drink two glasses of water morning of bloodwork        Patients undergoing fluorescein dye angiography may retain small amounts of fluorescein in the body for 48-72 hours post procedure  Samples containing fluorescein can produce falsely depressed TSH values  If the patient had this procedure,a specimen should be resubmitted post fluorescein clearance            The recommended reference ranges for TSH during pregnancy are as follows:  First trimester 0 1 to 2 5 uIU/mL  Second trimester  0 2 to 3 0 uIU/mL  Third trimester 0 3 to 3 0 uIU/m

## 2018-01-12 VITALS
WEIGHT: 118 LBS | DIASTOLIC BLOOD PRESSURE: 78 MMHG | RESPIRATION RATE: 16 BRPM | BODY MASS INDEX: 24.77 KG/M2 | SYSTOLIC BLOOD PRESSURE: 168 MMHG | HEIGHT: 58 IN | HEART RATE: 72 BPM

## 2018-01-13 VITALS
SYSTOLIC BLOOD PRESSURE: 132 MMHG | BODY MASS INDEX: 23.09 KG/M2 | TEMPERATURE: 96.4 F | DIASTOLIC BLOOD PRESSURE: 80 MMHG | HEIGHT: 58 IN | WEIGHT: 110 LBS | RESPIRATION RATE: 16 BRPM

## 2018-01-13 VITALS
HEART RATE: 82 BPM | OXYGEN SATURATION: 98 % | RESPIRATION RATE: 16 BRPM | BODY MASS INDEX: 24.35 KG/M2 | DIASTOLIC BLOOD PRESSURE: 70 MMHG | TEMPERATURE: 96.2 F | SYSTOLIC BLOOD PRESSURE: 140 MMHG | HEIGHT: 58 IN | WEIGHT: 116 LBS

## 2018-01-13 VITALS
SYSTOLIC BLOOD PRESSURE: 170 MMHG | DIASTOLIC BLOOD PRESSURE: 80 MMHG | HEIGHT: 58 IN | WEIGHT: 115.5 LBS | HEART RATE: 78 BPM | BODY MASS INDEX: 24.24 KG/M2

## 2018-01-13 NOTE — PROGRESS NOTES
Assessment    1  Chronic lymphocytic leukemia (204 10) (C91 10)    Plan  Chronic lymphocytic leukemia    · Drink plenty of fluids ; Status:Complete;   Done: 11ETS0188   Ordered; For:Chronic lymphocytic leukemia; Ordered By:Prerna Fuchs;   · Follow-up visit in 6 months Evaluation and Treatment  Follow-up  Status: Hold For -  Scheduling  Requested for: [id number]   Ordered; For: Chronic lymphocytic leukemia; Ordered By: Mj Olvera Performed:  Due: 66EOS0118   · (1) CBC/PLT/DIFF; Status:Active; Requested [id number];    Perform:[facility]; [facility]; For:Chronic lymphocytic leukemia; Ordered By:Prerna Fuchs;   · (1) COMPREHENSIVE METABOLIC PANEL; Status:Active; Requested DYF:[id number];    Perform:[facility]; [facility]; For:Chronic lymphocytic leukemia; Ordered By:Prerna Fuchs;    Discussion/Summary  Discussion Summary:   In summary, this is a 80-year-old female history of CLL as outlined  She has no physical exam or laboratory evidence of progressive lymphoproliferative disorder  Overall she's doing well  Observation remains appropriate  I reviewed over the patient and her daughter  She voiced understanding and agreement  Understands and agrees with treatment plan: The treatment plan was reviewed with the patient/guardian  The patient/guardian understands and agrees with the treatment plan   Counseling Documentation With Imm: The patient was counseled regarding diagnostic results, instructions for management, patient and family education, impressions  total time of encounter was 15 minutes  History of Present Illness  HPI: - diagnosed with CLL  Mild leukocytosis  13 q- (a favorable prognostic feature) noted  Interval History:  quite ill with late stage Alzheimer    Recently started hospice  Pt not eating well or regularly  Last week was very tired and dizzy        Review of Systems  Complete-Female:   Constitutional: feeling tired, but No fever, no chills, feels well, no tiredness, no recent weight gain or weight loss  Eyes: No complaints of eye pain, no red eyes, no eyesight problems, no discharge, no dry eyes, no itching of eyes  ENT: no complaints of earache, no loss of hearing, no nose bleeds, no nasal discharge, no sore throat, no hoarseness  Cardiovascular: No complaints of slow heart rate, no fast heart rate, no chest pain, no palpitations, no leg claudication, no lower extremity edema  Respiratory: shortness of breath during exertion and has worsened over past 4-6 months  Gastrointestinal: No complaints of abdominal pain, no constipation, no nausea or vomiting, no diarrhea, no bloody stools  Genitourinary: No complaints of dysuria, no incontinence, no pelvic pain, no dysmenorrhea, no vaginal discharge or bleeding  Musculoskeletal: Chronic back pain for years  RA fluctuates quite a bit  Heels and legs hurt  Wake her from sleep  Integumentary: No complaints of skin rash or lesions, no itching, no skin wounds, no breast pain or lump  Neurological: No complaints of headache, no confusion, no convulsions, no numbness, no dizziness or fainting, no tingling, no limb weakness, no difficulty walking  Psychiatric: Not suicidal, no sleep disturbance, no anxiety or depression, no change in personality, no emotional problems  Endocrine: No complaints of proptosis, no hot flashes, no muscle weakness, no deepening of the voice, no feelings of weakness  Hematologic/Lymphatic: No complaints of swollen glands, no swollen glands in the neck, does not bleed easily, does not bruise easily  Active Problems    1  Arthritis (716 90) (M19 90)   2  Chronic kidney insufficiency (585 9) (N18 9)   3  Chronic lymphocytic leukemia (204 10) (C91 10)   4  Chronic obstructive pulmonary disease (496) (J44 9)   5  Combined disorders of mitral, 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)   6  Dyslipidemia (272 4) (E78 5)   7   Emphysema (492 8) (J43 9)   8  Esophageal reflux (530 81) (K21 9)   9  Fatigue (780 79) (R53 83)   10  Hemoptysis (786 30) (R04 2)   11  Hip pain (719 45) (M25 559)   12  Hypertension (401 9) (I10)   13  Hypothyroidism (244 9) (E03 9)   14  Joint pain, knee (719 46) (M25 569)   15  Near syncope (780 2) (R55)   16  Osteoarthritis (715 90) (M19 90)   17  Postmenopausal state (V49 81) (Z78 0)   18  Pulmonary artery hypertension (416 8) (I27 2)   19  Spinal stenosis (724 00) (M48 00)    Past Medical History    1  History of Carpal tunnel syndrome, unspecified laterality (354 0) (G56 00)   2  History of Cubital tunnel syndrome on left (354 2) (G56 22)   3  History of Cubital tunnel syndrome on right (354 2) (G56 21)   4  History of Ectopic heartbeats (427 60) (I49 49)   5  History of Malignant melanoma of skin (172 9) (C43 9)   6  History of Transient ischemic attack (435 9) (G45 9)    Surgical History    1  History of Appendectomy   2  History of Breast Surgery Lumpectomy   3  History of Cataract Surgery   4  History of  Section   5  History of Fusion / Refusion Of Vertebrae   6  History of Hysterectomy   7  History of Laminectomy Lumbar   8  History of Neuroplasty Decompression Median Nerve At Carpal Tunnel   9  History of Oophorectomy   10  History of Thoracoscopy (Therapeutic) With Pleurodesis   11  History of Tonsillectomy    Family History    1  Family history of Colon Cancer (V16 0)   2  Family history of Hypertension (V17 49)    3  Family history of Heart Disease (V17 49)   4  Family history of Rheumatoid Arthritis    5  Family history of Diabetes Mellitus (V18 0)    6  Family history of Diabetes Mellitus (V18 0)    7  Family history of Cancer   8  Family history of Stroke Syndrome (V17 1)    Social History    · Being A Social Drinker   · Never A Smoker   ·     Current Meds   1  ALPRAZolam 0 25 MG Oral Tablet; TAKE 1 TABLET twice a day AS NEEDED; Therapy: 35FRZ8591 to (Renew:2016);  Last Rx:00Viq7222 Ordered 2  CeleBREX 200 MG Oral Capsule; take 1 capsule twice a day; Therapy: 23ZID0853 to (Last Missy Bence)  Requested for: 23Nov2012 Ordered   3  Co Q-10 100 MG Oral Capsule; Therapy: (Alycia Nur) to Recorded   4  Levothyroxine Sodium 112 MCG Oral Tablet; Take 1 tablet daily as directed; Therapy: 62RAN1802 to (Last Rx:57Uzx4654)  Requested for: 42Skg7220 Ordered   5  Losartan Potassium 50 MG Oral Tablet; TAKE A HALF TABLET EVERY DAY; Therapy: 04PLU5795 to (Evaluate:16Jun2016)  Requested for: 48YKD5770; Last   Rx:29Jun2015 Ordered   6  Montelukast Sodium 10 MG Oral Tablet; Take 1 tablet daily; Therapy: 42TFN9824 to (Last Rx:16Nov2015)  Requested for: 56HRB9918 Ordered   7  Pataday 0 2 % Ophthalmic Solution; Therapy: 17LAW7823 to Recorded   8  Patanase 0 6 % Nasal Solution; Therapy: (Recorded:66Bwz0922) to Recorded   9  Premarin 1 25 MG Oral Tablet; Take 1 tablet daily; Therapy: 42DFU0863 to (Last Rx:09Gju3706)  Requested for: 02OSO9129 Ordered   10  PriLOSEC 20 MG Oral Capsule Delayed Release; Therapy: 74GAZ5383 to Recorded   11  Triamterene-HCTZ 37 5-25 MG Oral Tablet; TAKE 1 TABLET DAILY AS DIRECTED     Requested for: 86UBK6560; Last Rx:22Jun2015 Ordered   12  Vitamin B6 TABS; Therapy: (Recorded:20Nov2013) to Recorded   13  Vitamin C 500 MG Oral Tablet; Therapy: (Recorded:20Nov2013) to Recorded   14  Vitamin D3 2000 UNIT Oral Capsule; Therapy: (Recorded:20Nov2013) to Recorded   15  Zinc 30 MG Oral Tablet; Therapy: (Recorded:20Nov2013) to Recorded    Allergies    1  Aspirin TABS   2  Ativan TABS   3  Codeine Derivatives   4  Enbrel SOLN   5  Penicillins   6  Povidone SOLN   7   PredniSONE TABS    Vitals  Vital Signs [Data Includes: Current Encounter]    Recorded: 15MBQ3495 12:21PM   Temperature 96 F   Heart Rate 94   Respiration 18   Systolic 686   Diastolic 60   Height 4 ft 10 in   Weight 119 lb    BMI Calculated 24 87   BSA Calculated 1 46   O2 Saturation 94   Pain Scale 0 Physical Exam    Constitutional   General appearance: No acute distress, well appearing and well nourished  Eyes   Conjunctiva and lids: No swelling, erythema or discharge  Ears, Nose, Mouth, and Throat   External inspection of ears and nose: Normal     Oropharynx: Normal with no erythema, edema, exudate or lesions  Pulmonary   Auscultation of lungs: Clear to auscultation  Cardiovascular   Auscultation of heart: Normal rate and rhythm, normal S1 and S2, without murmurs  Examination of extremities for edema and/or varicosities: Normal     Abdomen   Abdomen: Non-tender, no masses  Liver and spleen: No hepatomegaly or splenomegaly  Lymphatic   Palpation of lymph nodes in neck: No lymphadenopathy  Musculoskeletal   Gait and station: Normal     Skin   Skin and subcutaneous tissue: Normal without rashes or lesions  Neurologic   Cranial nerves: Cranial nerves 2-12 intact  Psychiatric   Orientation to person, place, and time: Normal          Future Appointments    Date/Time Provider Specialty Site   04/07/2016 11:00 AM GERALD Monterroso   [address]   Electronically signed by : Severiano Amsterdam, M D DOM D ,DO; Mar 11 2016 12:37PM EST                       (Author)

## 2018-01-14 VITALS
SYSTOLIC BLOOD PRESSURE: 138 MMHG | DIASTOLIC BLOOD PRESSURE: 64 MMHG | OXYGEN SATURATION: 96 % | RESPIRATION RATE: 15 BRPM | HEART RATE: 75 BPM | BODY MASS INDEX: 37.22 KG/M2 | WEIGHT: 177.31 LBS | TEMPERATURE: 97.9 F | HEIGHT: 58 IN

## 2018-01-14 VITALS
DIASTOLIC BLOOD PRESSURE: 70 MMHG | HEART RATE: 70 BPM | SYSTOLIC BLOOD PRESSURE: 140 MMHG | TEMPERATURE: 96.3 F | BODY MASS INDEX: 24.45 KG/M2 | WEIGHT: 116.5 LBS | OXYGEN SATURATION: 94 % | RESPIRATION RATE: 16 BRPM | HEIGHT: 58 IN

## 2018-01-14 NOTE — PROGRESS NOTES
Assessment   1  Cervical osteoarthritis (721 0) (M47 812)   2  Hypertension (401 9) (I10)   3  Hypothyroidism (244 9) (E03 9)   4  Dyslipidemia (272 4) (E78 5)   5  Spinal stenosis (724 00) (M48 00)    Plan   Cervical osteoarthritis    · Avoid lifting anything over 10 pounds ; Status:Complete;   Done: 41QYA8119   · There are many exercise options for seniors ; Status:Complete;   Done: 27UIH7641   · Call [phone] if: The pain seems worse ; Status:Complete;   Done: 00FBD1017   · Call [phone] if: The symptoms seem worse ; Status:Complete;   Done:    28GKI7723   · * MRI CERVICAL SPINE WO CONTRAST; Status:Need Information - Financial    Authorization; Requested for:[id number];   Cervical osteoarthritis, Dyslipidemia, Hypertension, Hyponatremia, Hypothyroidism,    Spinal stenosis    · (1) CBC/PLT/DIFF; Status:Active; Requested for:61Tdm1796;    · (1) COMPREHENSIVE METABOLIC PANEL; Status:Active; Requested for:04Thj3228;    · (1) LIPID PANEL, FASTING; Status:Active; Requested for:76Jhg2471;    · (1) TSH; Status:Active; Requested for:43Zqy2111;   Dyslipidemia    · Call [phone] if: You have muscle cramps ; Status:Complete;   Done: 16HSE9876   · Call [phone] if: You have pain in the stomach area ; Status:Complete;   Done:    42EEK1272   · Call [phone] if: You start vomiting ; Status:Complete;   Done: 96KIS8921   · Call 911 if: You experience a new kind of chest pain (angina) or pressure ;    Status:Complete;   Done: 64KBG0361   · Call 911 if: You have any symptoms of a stroke ; Status:Complete;   Done: 71SUU1803   · Begin or continue regular aerobic exercise   Gradually work up to at least 3 sessions of    30 minutes of exercise a week ; Status:Complete;   Done: 31MYI6286   · Eat no more than 30 grams of fat per day ; Status:Complete;   Done: 65JDV7137  Hypertension    · A diet low in sodium and high in potassium, magnesium, and calcium can help your    blood pressure ; Status:Complete;   Done: 58HGO8023   · Begin a limited exercise program ; Status:Complete;   Done: 74TRX8794   · Eat a low fat and low cholesterol diet ; Status:Complete;   Done: 24TIW4540   · We encourage you to begin to make lifestyle changes to help control your blood    pressure  These may include losing weight, increasing your activity level, limiting salt in    your diet, decreasing alcohol intake, and eating a diet low in fat and rich in fruits    and vegetables ; Status:Complete;   Done: 38XWO5731   · Call [phone] if: You become dizzy or lightheaded, especially when you stand up    after sitting for a while ; Status:Complete;   Done: 87RBV2546   · Call [phone] if: You develop double vision (see two of everything) ;    Status:Complete;   Done: 59DPX9366   · Seek Immediate Medical Attention if: You have a severe headache that will not go away ;    Status:Complete;   Done: 43SPT7252   · Seek Immediate Medical Attention if: Your blood pressure is greater than 250/120 for 2    consecutive readings ; Status:Complete;   Done: 60BKA3340    Discussion/Summary      Reviewed recent laboratory testing with her  White blood cell count is mildly, chronically elevated related to her CLL  She is going to continue to follow up with Hematology regarding this  TSH is very mildly low  Will continue her current dosage and continue to follow this and adjust dosage if needed  Lipid panel has improved  She has declined cholesterol medication in the past  Discussed possible evaluation for the neck  She is requesting an MRI for further investigation  With her significant issues with spinal stenosis in the low back, this is a legitimate option  Orders as noted above  Continue follow-up with Cardiology as scheduled  Be very careful to avoid falls  She declines immunizations  Will have her follow up in about 3-4 months with laboratory testing prior to that visit  Follow up sooner if needed      Possible side effects of new medications were reviewed with the patient/guardian today  The treatment plan was reviewed with the patient/guardian  The patient/guardian understands and agrees with the treatment plan      Chief Complaint   CC patient here today for routine visit  [facility]  History of Present Illness   She presents for routine follow-up  Has generally been doing about the same except for worsening pain into her neck and back area  She feels this is related to the degenerative changes as well as the scoliosis  She notices that she is leaning toward the right because this feels more comfortable  Denies any recent injury  Occasional numbness and tingling into her hands but denies any weakness into the hands  Having some headaches on and off which she attributes to the neck symptoms  Tolerating her losartan without difficulty  Denies any chest pain or palpitations  Continues to follow-up with Cardiology on a regular basis  She had been on the isosorbide for the pulmonary hypertension but she could not take these because she had significant headaches and difficulty with concentration while taking this  Felt somewhat more unsteady on her feet as well  Tolerating her levothyroxine without difficulty  Does have a lot of stress with the psychiatric illnesses of her children especially her 1 son  Appetite remains relatively stable  Denies any nausea, vomiting, or diarrhea  Denies any changes in bowel movements  Usually sleeps relatively well  Has had orthostatics symptoms in the past with changes in position but not as significant recently  Review of Systems        Constitutional: feeling tired, but-- no fever-- and-- no chills  Eyes: dryness of the eyes  ENT: no sore throat-- and-- no hoarseness  Cardiovascular: no chest pain-- and-- no palpitations  Respiratory: shortness of breath during exertion  Gastrointestinal: no nausea-- and-- no diarrhea  Genitourinary: no dysuria  Musculoskeletal: as noted in HPI  Integumentary: no rashes  Neurological: as noted in HPI-- and-- no dizziness  Psychiatric: as noted in HPI  Hematologic/Lymphatic: no tendency for easy bleeding-- and-- no tendency for easy bruising  ROS reviewed  Active Problems   1  Arthritis (716 90) (M19 90)   2  Cervical osteoarthritis (721 0) (M47 812)   3  Chronic kidney insufficiency (585 9) (N18 9)   4  Chronic lymphocytic leukemia (204 10) (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 10)   5  Chronic obstructive pulmonary disease (496) (J44 9)   6  Combined disorders of mitral, 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)   7  Dyslipidemia (272 4) (E78 5)   8  Emphysema (492 8) (J43 9)   9  Esophageal reflux (530 81) (K21 9)   10  Hypertension (401 9) (I10)   11  Hypokalemia (276 8) (E87 6)   12  Hyponatremia (276 1) (E87 1)   13  Hypothyroidism (244 9) (E03 9)   14  Lumbar stenosis (724 02) (M48 061)   15  Medicare annual wellness visit, subsequent (V70 0) (Z00 00)   16  Osteoarthritis (715 90) (M19 90)   17  Premature atrial contraction (427 61) (I49 1)   18  Pulmonary artery hypertension (416 8) (I27 21)   19  Scoliosis (737 30) (M41 9)   20  Screening for osteoporosis (V82 81) (Z13 820)   21  Spinal stenosis (724 00) (M48 00)   22  Trochanteric bursitis of both hips (726 5) (M70 61,M70 62)    Past Medical History   1  History of Carpal tunnel syndrome, unspecified laterality (354 0) (G56 00)   2  History of Cubital tunnel syndrome on left (354 2) (G56 22)   3  History of Cubital tunnel syndrome on right (354 2) (G56 21)   4  History of Ectopic heartbeats (427 60) (I49 49)   5  History of Hemoptysis (786 30) (R04 2)   6  History of Malignant melanoma of skin (172 9) (C43 9)   7  History of Near syncope (780 2) (R55)   8  History of Transient ischemic attack (435 9) (G45 9)     The active problems and past medical history were reviewed and updated today  Surgical History   1  History of Appendectomy   2   History of Breast Surgery Lumpectomy   3  History of Cataract Surgery   4  History of  Section   5  History of Fusion / Refusion Of Vertebrae   6  History of Hysterectomy   7  History of Laminectomy Lumbar   8  History of Neuroplasty Decompression Median Nerve At Carpal Tunnel   9  History of Oophorectomy   10  History of Thoracoscopy (Therapeutic) With Pleurodesis   11  History of Tonsillectomy     The surgical history was reviewed and updated today  Family History   Mother    1  Family history of Colon Cancer (V16 0)   2  Family history of Hypertension (V17 49)  Father    3  Family history of Heart Disease (V17 49)   4  Family history of Rheumatoid Arthritis  Paternal Aunt    5  Family history of Diabetes Mellitus (V18 0)  Maternal Uncle    6  Family history of Diabetes Mellitus (V18 0)  Family History    7  Family history of Cancer   8  Family history of Stroke Syndrome (V17 1)     The family history was reviewed and updated today  Social History    · Being A Social Drinker   · Never A Smoker   ·   The social history was reviewed and updated today  Current Meds    1  ALPRAZolam 0 25 MG Oral Tablet; TAKE 1 TABLET twice a day AS NEEDED; Therapy: 93MKG2955 to (Renew:2016); Last Rx:2016 Ordered   2  CeleBREX 200 MG Oral Capsule; take 1 capsule daily; Therapy: (Recorded:23Vrr6570) to Recorded   3  Co Q-10 100 MG Oral Capsule; Therapy: (Kori Castrejon) to Recorded   4  Cranberry CAPS; Therapy: (Recorded:2017) to Recorded   5  Levothyroxine Sodium 112 MCG Oral Tablet; Take 1 tablet daily as directed; Therapy: 11IPB3815 to (Last Rx:41Opa5770)  Requested for: 52Yrc7357 Ordered   6  Losartan Potassium 50 MG Oral Tablet; TAKE ONE-HALF (1/2) TABLET DAILY; Therapy: 76PBZ4811 to (Last Rx:44Bsk1352)  Requested for: 11Guy4517 Ordered   7  Montelukast Sodium 10 MG Oral Tablet; Take 1 tablet daily;      Therapy: 92AFX7994 to (Last Rx:2017)  Requested for: 27XKO2727 Ordered   8  Pataday 0 2 % Ophthalmic Solution; Therapy: 58VSF8725 to Recorded   9  Premarin 1 25 MG Oral Tablet; Take 1 tablet daily; Therapy: 72AEZ2050 to (Last Rx:55Cug2932)  Requested for: 02Sep2017 Ordered   10  PriLOSEC 20 MG CPDR; Therapy: 40SMD7123 to Recorded   11  Triamterene-HCTZ 37 5-25 MG Oral Tablet; Take 1 tablet daily as directed; Therapy: 30Szx6416 to (Last Rx:17Vjo9843)  Requested for: 14Oct2017 Ordered   12  Vigamox 0 5 % Ophthalmic Solution; Therapy: (Recorded:35Nsc9527) to Recorded   13  Vitamin B6 TABS; Therapy: (Recorded:31Hys4011) to Recorded   14  Vitamin C 250 MG Oral Tablet; TAKE 1 TABLET DAILY; Therapy: (Recorded:72Myc0437) to Recorded   15  Vitamin D3 2000 UNIT Oral Capsule; Therapy: (Recorded:16Dxo0676) to Recorded   16  Vitamin E 200 UNIT Oral Tablet; Take 1 tablet daily; Therapy: (Recorded:91Baj3561) to Recorded   17  Zinc Gluconate 15 MG Oral Tablet; TAKE 1 TABLET DAILY; Therapy: (Recorded:36Ezy7986) to Recorded     The medication list was reviewed and updated today  Allergies   1  Aspirin TABS   2  Ativan TABS   3  Betadine   4  Codeine Derivatives   5  Enbrel SOLN   6  Penicillins   7  Povidone SOLN   8  PredniSONE TABS    Vitals   Vital Signs    Recorded: 20KLG7678 01:37PM   Temperature 96 9 F, Temporal   Heart Rate 71   Respiration 16   Systolic 754, LUE, Sitting   Diastolic 60, LUE, Sitting   Height 4 ft 10 in   Weight 114 lb    BMI Calculated 23 83   BSA Calculated 1 43   O2 Saturation 99     Physical Exam        Constitutional      General appearance: No acute distress, well appearing and well nourished  Eyes      Conjunctiva and lids: No swelling, erythema or discharge  Pupils and irises: Abnormal  -- Post surgical changes  Ears, Nose, Mouth, and Throat      External inspection of ears and nose: Normal        Otoscopic examination: Tympanic membranes translucent with normal light reflex   Canals patent without erythema  Oropharynx: Normal with no erythema, edema, exudate or lesions  Pulmonary      Auscultation of lungs: Clear to auscultation  Cardiovascular      Auscultation of heart: Abnormal   The heart rate was normal  The rhythm was regular with premature beats  Lymphatic      Palpation of lymph nodes in neck: No lymphadenopathy  Musculoskeletal      Gait and station: Abnormal  -- Slow but steady  Inspection/palpation of joints, bones, and muscles: Abnormal  -- Diffuse tenderness over the cervical paraspinal area; scoliosis; leaning slightly toward the right when sitting  Skin      Skin and subcutaneous tissue: Normal without rashes or lesions  Psychiatric      Mood and affect: Normal           Results/Data   (1) CBC/PLT/DIFF 78KPN1405 03:02PM Lizabeth Lugo Order Number: [id number]      Test Name Result Flag Reference   WBC COUNT 15 84 Thousand/uL H 4 31-10 16   RBC COUNT 4 14 Million/uL  3 81-5 12   HEMOGLOBIN 12 6 g/dL  11 5-15 4   HEMATOCRIT 36 8 %  34 8-46  1   MCV 89 fL  82-98   MCH 30 4 pg  26 8-34 3   MCHC 34 2 g/dL  31 4-37 4   RDW 12 5 %  11 6-15 1   MPV 11 2 fL  8 9-12 7   PLATELET COUNT 338 Thousands/uL  149-390   This is an appended report  These results have been appended to a previously verified report     NEUTROPHILS - REL 34 % L 43-75   LYMPHOCYTES - REL 59 % H 14-44   MONOCYTES - REL 5 %  4-12   EOSINOPHILS - REL 1 %  0-6   BASOPHILS - REL 1 %  0-1   NEUTROPHILS ABS 5 39 Thousand/uL  1 85-7 62   LYMPHOTCYTES ABS 9 35 Thousand/uL H 0 60-4 47   MONOCYTES ABS 0 79 Thousand/uL  0 00-1 22   EOSINOPHILS ABS 0 16 Thousand/uL  0 00-0 40   BASOPHILS ABS 0 16 Thousand/uL H 0 00-0 10   TOTAL COUNTED 100     Anisocytosis Present     PLT ESTIMATE Adequate  Adequate      (1) COMPREHENSIVE METABOLIC PANEL 27TRN1403 93:78RU Lizabeth Lugo Order Number: [id number]      Test Name Result Flag Reference   SODIUM 131 mmol/L L 136-145   POTASSIUM 3 6 mmol/L  3 5-5 3   CHLORIDE 93 mmol/L L 100-108   CARBON DIOXIDE 30 mmol/L  21-32   ANION GAP (CALC) 8 mmol/L  4-13   BLOOD UREA NITROGEN 26 mg/dL H 5-25   CREATININE 1 12 mg/dL  0 60-1 30   Standardized to IDMS reference method   CALCIUM 8 7 mg/dL  8 3-10 1   BILI, TOTAL 0 40 mg/dL  0 20-1 00   ALK PHOSPHATAS 57 U/L     ALT (SGPT) 26 U/L  12-78   Specimen collection should occur prior to Sulfasalazine administration due to the potential for falsely depressed results  AST(SGOT) 26 U/L  5-45   Specimen collection should occur prior to Sulfasalazine administration due to the potential for falsely depressed results  ALBUMIN 3 6 g/dL  3 5-5 0   TOTAL PROTEIN 6 8 g/dL  6 4-8 2   eGFR 45 ml/min/1 73sq [facility] Disease Education Program recommendations are as follows:     GFR calculation is accurate only with a steady state creatinine     Chronic Kidney disease less than 60 ml/min/1 73 sq  meters     Kidney failure less than 15 ml/min/1 73 sq  meters  GLUCOSE FASTING 78 mg/dL  65-99   Specimen collection should occur prior to Sulfasalazine administration due to the potential for falsely depressed results  Specimen collection should occur prior to Sulfapyridine administration due to the potential for falsely elevated results  (1) LIPID PANEL, FASTING 16QYR4759 03:02PM Greg Hicks Order Number: [id number]      Test Name Result Flag Reference   CHOLESTEROL 237 mg/dL H    HDL,DIRECT 94 mg/dL H 40-60   Specimen collection should occur prior to Metamizole administration due to the potential for falsley depressed results     LDL CHOLESTEROL CALCULATED 106 mg/dL H 0-100   Triglyceride:           Normal <150 mg/dl      Borderline High 150-199 mg/dl      High 200-499 mg/dl      Very High >499 mg/dl               Cholesterol:          Desirable <200 mg/dl       Borderline High 200-239 mg/dl       High >239 mg/dl               HDL Cholesterol: High>59 mg/dL       Low <41 mg/dL               This screening LDL is a calculated result  It does not have the accuracy of the Direct Measured LDL in the monitoring of patients with hyperlipidemia and/or statin therapy  Direct Measure LDL (PGS499) must be ordered separately in these patients  TRIGLYCERIDES 186 mg/dL H <=150   Specimen collection should occur prior to N-Acetylcysteine or Metamizole administration due to the potential for falsely depressed results  (1) TSH 01DBU9801 03:02PM Sherryle Sessions Order Number: [id number]      Test Name Result Flag Reference   TSH 0 338 uIU/mL L 0 358-3 740   Patients undergoing fluorescein dye angiography may retain small amounts of fluorescein in the body for 48-72 hours post procedure  Samples containing fluorescein can produce falsely depressed TSH values  If the patient had this procedure,a specimen should be resubmitted post fluorescein clearance                           The recommended reference ranges for TSH during pregnancy are as follows:     First trimester 0 1 to 2 5 uIU/mL     Second trimester  0 2 to 3 0 uIU/mL     Third trimester 0 3 to 3 0 uIU/m      Future Appointments      Date/Time Provider Specialty Site   04/06/2018 01:00 PM GERALD Berry , [facility] Hematology On[facility] [place]   01/17/2018 01:20 PM [facility] Cardiology [address]     Signatures    Electronically signed by : GERALD Christensen ; Jan 13 2018  3:37PM EST                       (Author)

## 2018-01-15 NOTE — RESULT NOTES
Verified Results  (1) BASIC METABOLIC PROFILE 55FEH0343 02:58PM Vivian Olmos Order Number: [id number]     Test Name Result Flag Reference   GLUCOSE,RANDM 87 mg/dL     If the patient is fasting, the ADA then defines impaired fasting glucose as > 100 mg/dL and diabetes as > or equal to 123 mg/dL  SODIUM 134 mmol/L L 136-145   POTASSIUM 3 8 mmol/L  3 5-5 3   CHLORIDE 96 mmol/L L 100-108   CARBON DIOXIDE 30 mmol/L  21-32   ANION GAP (CALC) 8 mmol/L  4-13   BLOOD UREA NITROGEN 19 mg/dL  5-25   CREATININE 1 03 mg/dL  0 60-1 30   Standardized to IDMS reference method   CALCIUM 9 0 mg/dL  8 3-10 1   eGFR Non-African American 51 2 ml/min/1 73sq m     [facility] Disease Education Program recommendations are as follows:  GFR calculation is accurate only with a steady state creatinine  Chronic Kidney disease less than 60 ml/min/1 73 sq  meters  Kidney failure less than 15 ml/min/1 73 sq  meters

## 2018-01-16 NOTE — RESULT NOTES
Verified Results  * US ABDOMEN COMPLETE [id number] 10:43AM Unha Morning Order Number: [id number]    - Patient Instructions: To schedule this appointment, please contact Central Scheduling at [phone]  Test Name Result Flag Reference   US ABDOMEN COMPLETE (Report)     ABDOMEN ULTRASOUND, COMPLETE     INDICATION: Hysterectomy  Left upper quadrant pain  History of renal failure  COMPARISON: None  TECHNIQUE:  Real-time ultrasound of the abdomen was performed with a curvilinear transducer with both volumetric sweeps and still imaging techniques  FINDINGS:     PANCREAS: Visualized portions of the pancreas are within normal limits  AORTA AND IVC: Visualized portions are normal for patient age  LIVER:   Size: Within normal range  The liver measures 13 3 cm in the midclavicular line  Contour: Surface contour is smooth  Parenchyma: Echogenicity and echotexture are within normal limits  Simple cyst is noted in the right hepatic lobe (2 1 x 1 7 x 1 9 cm)  The main portal vein is patent and hepatopetal       BILIARY:   The gallbladder is normal in caliber  No wall thickening or pericholecystic fluid  No stones or sludge identified  Sonographic Wilbur Kadi sign is negative  No intrahepatic biliary dilatation  CBD measures 2 5 mm  No choledocholithiasis  KIDNEY:    Right kidney measures 9 3 x 3 7 cm  Within normal limits  Left kidney measures 7 0 x 3 4 cm  Within normal limits  SPLEEN:    Measures 8 4 x 3 6 x 3 6 cm  cm    Within normal limits  ASCITES: None  IMPRESSION:     Right hepatic lobe cyst      Negative for hydronephrosis  Workstation performed: [id number]     Signed by:   Collette Rossetti Shlomo Sanes, MD   11/9/16

## 2018-01-18 NOTE — RESULT NOTES
Verified Results  * CT CHEST WO CONTRAST [id number] 01:18PM King Nav Order Number: [id number]    - Patient Instructions: To schedule this appointment, please contact Central Scheduling at [phone]   Order Number: [id number]    - Patient Instructions: To schedule this appointment, please contact Central Scheduling at [phone]   Order Number: [id number]    - Patient Instructions: To schedule this appointment, please contact Central Scheduling at [phone]  Test Name Result Flag Reference   CT CHEST WO CONTRAST (Report)     CT CHEST WITHOUT IV CONTRAST     INDICATION: Follow-up nodule     COMPARISON: 6/3/2015     TECHNIQUE: CT examination of the chest was performed without intravenous contrast  Axial, sagittal and coronal reformatted images were submitted for interpretation  Coronal thick section MIP (maximal intensity projection) images were also created  This examination, like all CT scans performed in the [facility], was performed utilizing techniques to minimize radiation dose exposure, including the use of iterative reconstruction and automated exposure control  FINDINGS:     LUNGS: There is centrilobular emphysema  Bilateral lung nodules are again seen as follows: Left upper lobe lung nodule on series 3, image 18 measuring 6 mm, previously 6 mm  Left upper lobe nodule measuring 4 mm on image 23, not present previously  Left upper lobe nodule measuring 3 mm on image 27, previously 3 mm  4 mm right upper lobe nodule on image 16, previously 4 mm  4 mm right upper lobe nodule on image 25, previously 4 mm  There is a focal density measuring 8 mm in the left upper lobe    which is increased from the prior exam  There is multifocal subpleural nodularity and scarring particularly in the upper lobes  There is calcified density at the left base, stable  PLEURA: Unremarkable  HEART/GREAT VESSELS: Unremarkable for patient's age  MEDIASTINUM AND SANJAY: Unremarkable  CHEST WALL AND LOWER NECK: Unremarkable  VISUALIZED STRUCTURES IN THE UPPER ABDOMEN: Limited evaluation secondary to extensive beam hardening artifact from lumbar spine hardware  No gross abnormality identified  There is a right retrocrural low-attenuation lesion measuring 1 5 x 2 3 cm,    stable in retrospect measuring 1 5 x 2 4 cm  A smaller low-attenuation focus is seen on the left measuring approximately 9 x 11 mm, previously 10 x 11 mm  This is of uncertain etiology though they were also present on a CT of the abdomen and pelvis    from 2011, partially imaged, measuring 1 4 x 1 8 cm on the right and 1 0 x 1 5 cm on the left  OSSEOUS STRUCTURES: No acute fracture  No destructive osseous lesion  IMPRESSION:     1  Centrilobular emphysema with numerous lung nodules measuring up to 6 mm, most of which are stable compared to the prior study  However, there is developing density in the left apex, with focal opacity measuring 8 mm  Short interval follow-up CT is    recommended in 3 months to exclude developing neoplasm  2  Low density retrocrural masses, right greater than left, stable in retrospect from 2015  These were also present in retrospect in 2011, though partially imaged, increased on the right and decreased on the left  Differential includes low-density    lymph nodes, cystic schwannomas, neuroenteric or bronchogenic cysts and lymphoceles  While the lack of significant progression from 2011 is suggestive of a benign process, if there is clinical concern, contrast-enhanced MRI of the thoracic spine could    be obtained for more definitive diagnosis  Considerations related to the patient's age and/or comorbidities may be used to alter these recommendations        ##sigslh##sigslh     ##fuslh3##fuslh3       Workstation performed: [id number]     Signed by:   Alissa Stratton MD   8/4/16

## 2018-01-22 VITALS
HEART RATE: 76 BPM | SYSTOLIC BLOOD PRESSURE: 128 MMHG | RESPIRATION RATE: 16 BRPM | OXYGEN SATURATION: 97 % | WEIGHT: 118 LBS | BODY MASS INDEX: 24.77 KG/M2 | DIASTOLIC BLOOD PRESSURE: 76 MMHG | TEMPERATURE: 97.5 F | HEIGHT: 58 IN

## 2018-01-24 VITALS
DIASTOLIC BLOOD PRESSURE: 60 MMHG | HEIGHT: 58 IN | WEIGHT: 114 LBS | BODY MASS INDEX: 23.93 KG/M2 | HEART RATE: 71 BPM | TEMPERATURE: 96.9 F | OXYGEN SATURATION: 99 % | SYSTOLIC BLOOD PRESSURE: 110 MMHG | RESPIRATION RATE: 16 BRPM

## 2018-02-21 DIAGNOSIS — F41.9 ANXIETY: Primary | ICD-10-CM

## 2018-02-21 RX ORDER — ALPRAZOLAM 0.25 MG/1
0.25 TABLET ORAL 2 TIMES DAILY PRN
Qty: 90 TABLET | Refills: 0 | Status: SHIPPED | OUTPATIENT
Start: 2018-02-21 | End: 2018-10-02 | Stop reason: SDUPTHER

## 2018-02-21 RX ORDER — ALPRAZOLAM 0.25 MG/1
1 TABLET ORAL 2 TIMES DAILY PRN
COMMUNITY
Start: 2013-01-09 | End: 2018-02-21 | Stop reason: SDUPTHER

## 2018-03-20 ENCOUNTER — OFFICE VISIT (OUTPATIENT)
Dept: INTERNAL MEDICINE CLINIC | Facility: CLINIC | Age: 83
End: 2018-03-20
Payer: MEDICARE

## 2018-03-20 VITALS
RESPIRATION RATE: 16 BRPM | TEMPERATURE: 97.3 F | HEART RATE: 87 BPM | OXYGEN SATURATION: 94 % | SYSTOLIC BLOOD PRESSURE: 150 MMHG | BODY MASS INDEX: 24.77 KG/M2 | DIASTOLIC BLOOD PRESSURE: 70 MMHG | WEIGHT: 118 LBS | HEIGHT: 58 IN

## 2018-03-20 DIAGNOSIS — M06.9 RHEUMATOID ARTHRITIS INVOLVING MULTIPLE SITES, UNSPECIFIED RHEUMATOID FACTOR PRESENCE: ICD-10-CM

## 2018-03-20 DIAGNOSIS — I87.8 VENOUS STASIS: ICD-10-CM

## 2018-03-20 DIAGNOSIS — M06.9 RHEUMATOID ARTHRITIS INVOLVING MULTIPLE SITES, UNSPECIFIED RHEUMATOID FACTOR PRESENCE: Primary | ICD-10-CM

## 2018-03-20 PROBLEM — M48.061 LUMBAR STENOSIS: Status: ACTIVE | Noted: 2017-05-09

## 2018-03-20 PROBLEM — J98.4 PULMONARY DISEASE: Status: ACTIVE | Noted: 2018-03-20

## 2018-03-20 PROBLEM — Z98.890 HISTORY OF THORACOTOMY: Status: ACTIVE | Noted: 2018-03-20

## 2018-03-20 PROBLEM — E87.6 HYPOKALEMIA: Status: ACTIVE | Noted: 2017-05-09

## 2018-03-20 PROBLEM — R04.2 HEMOPTYSIS: Status: ACTIVE | Noted: 2018-03-20

## 2018-03-20 PROBLEM — C91.10 CLL (CHRONIC LYMPHOCYTIC LEUKEMIA) (HCC): Status: ACTIVE | Noted: 2018-03-20

## 2018-03-20 PROBLEM — Z98.890 HISTORY OF D&C: Status: ACTIVE | Noted: 2018-03-20

## 2018-03-20 PROBLEM — N18.30 CKD (CHRONIC KIDNEY DISEASE) STAGE 3, GFR 30-59 ML/MIN (HCC): Status: ACTIVE | Noted: 2018-03-20

## 2018-03-20 PROBLEM — Z87.09 HISTORY OF PNEUMOTHORAX: Status: ACTIVE | Noted: 2018-03-20

## 2018-03-20 PROBLEM — Z98.890 HISTORY OF LUMPECTOMY OF RIGHT BREAST: Status: ACTIVE | Noted: 2018-03-20

## 2018-03-20 PROBLEM — M47.812 CERVICAL OSTEOARTHRITIS: Status: ACTIVE | Noted: 2017-05-09

## 2018-03-20 PROBLEM — Z98.891 HX OF EMERGENCY CESAREAN SECTION: Status: ACTIVE | Noted: 2018-03-20

## 2018-03-20 PROCEDURE — 99213 OFFICE O/P EST LOW 20 MIN: CPT | Performed by: FAMILY MEDICINE

## 2018-03-21 PROBLEM — I87.8 VENOUS STASIS: Status: ACTIVE | Noted: 2018-03-21

## 2018-03-22 NOTE — PROGRESS NOTES
Assessment/Plan:    RA (rheumatoid arthritis) ([facility])  Continue the Celebrex  She has been on numerous medications and has not tolerated them  She is only tolerated and responded to the brand name Celebrex  She had been treated with numerous medications such as Feldene, Voltaren, and Enbrel  She had erosive gastritis with the Feldene and the Voltaren  She had a reaction to the Enbrel with increased shortness of breath and subsequent pneumothorax  She had tried the generic Celebrex but this was not effective  Venous stasis  She does have venous stasis changes bilateral feet  She has very little questioning into her feet which is likely affecting her pain  Discussed following up with Podiatry  Since she has good pulses in both of her feet, significant arterial issues are unlikely  Discussed with her arterial Doppler if symptoms persist or worsen  Diagnoses and all orders for this visit:    Rheumatoid arthritis involving multiple sites, unspecified rheumatoid factor presence ([facility] )  -     Discontinue: CELEBREX 200 MG capsule; Take 1 capsule (200 mg total) by mouth daily  -     Vitamin D 25 hydroxy; Future    Venous stasis        Follow up as previously scheduled  She does have a slip for laboratory testing  Subjective:      Patient ID: Ulisses De Luna is a 80 y o  female  She presents for acute visit  She has had increasing pain into both of her feet  Right foot worse than left  Has noticed they are more red and occasionally have even a bluish color  She can feel the pulses in her feet so she does not think this is vascular  Hurts to walk on her feet at times  Has not followed up with Podiatry  She feels that some of this is related to her rheumatoid arthritis  Has been having increasing issues with sleep  Has difficulty finding a comfortable position  Pain into her back and hips  She is requesting a refill on her Celebrex with brand name necessary    She has had GI issues with typical anti-inflammatories  She had shortness of breath and a pneumothorax after Enbrel  Generic Celebrex was ineffective for her pain  She had not gotten it filled for while because after her  had  she took his  The following portions of the patient's history were reviewed and updated as appropriate:   She  has no past medical history on file  She   Patient Active Problem List    Diagnosis Date Noted    Venous stasis 2018    CKD (chronic kidney disease) stage 3, GFR 30-59 ml/min 2018    CLL [facility] 2018    History of thoracotomy 2018    History of D&C 2018    History of lumpectomy of right breast 2018    History of pneumothorax 2018    Hx of emergency  section 2018     (spontaneous vaginal delivery) 2018    Hemoptysis 2018    Pulmonary disease 2018    RA (rheumatoid arthritis) ([facility] ) 2018    Cervical osteoarthritis 2017    Hypokalemia 2017    Lumbar stenosis 2017    Hyponatremia 10/31/2016    Premature atrial contraction 2016    Scoliosis 2016    Trochanteric bursitis of both hips 2015    Dyslipidemia 2014    Combined disorders of mitral, aortic and tricuspid valves 2014    Chronic obstructive pulmonary disease ([facility] ) 2013    Pulmonary artery hypertension 2013    Chronic lymphocytic leukemia ([facility] ) 2013    Pulmonary emphysema ([facility] ) 2013    Chronic kidney insufficiency 2012    Esophageal reflux 2012    Hypertension 2012    Hypothyroidism 2012    Osteoarthritis 2012    Spinal stenosis 2012    History of blood transfusion 1966     She  has no past surgical history on file  Her family history is not on file  She  has no tobacco, alcohol, and drug history on file    Current Outpatient Prescriptions   Medication Sig Dispense Refill    ALPRAZolam Sau[place] Glance) 0 25 mg tablet Take 1 tablet (0 25 mg total) by mouth 2 (two) times a day as needed for anxiety 90 tablet 0    ascorbic acid (VITAMIN C) 250 mg tablet Take 1 tablet by mouth daily      calcium-vitamin D (OSCAL-500) 500-400 MG-UNIT per tablet Take by mouth      Cholecalciferol (VITAMIN D3) 2000 units capsule Take by mouth      coenzyme Q-10 100 MG capsule Take by mouth      conjugated estrogens (PREMARIN) 1 25 mg tablet Take 1 tablet by mouth daily      Cranberry 400 MG CAPS Take by mouth      isosorbide mononitrate (ISMO,MONOKET) 20 mg tablet 20 mg in AM and 20 mg in PM      levothyroxine 112 mcg tablet Take 1 tablet by mouth daily      losartan (COZAAR) 50 mg tablet Take 0 5 tablets by mouth daily      montelukast (SINGULAIR) 10 mg tablet Take 1 tablet by mouth daily      moxifloxacin (VIGAMOX) 0 5 % ophthalmic solution Apply to eye      olopatadine HCl (PATADAY) 0 2 % opth drops Apply to eye      omeprazole (PRILOSEC) 20 mg delayed release capsule Take 20 mg by mouth      pyridoxine (VITAMIN B6) 100 mg tablet Take by mouth      triamterene-hydrochlorothiazide (MAXZIDE-25) 37 5-25 mg per tablet Take 1 tablet by mouth daily      vitamin E 100 UNIT capsule Take 15 mg by mouth      Vitamin E 200 units TABS Take 1 tablet by mouth daily      Zinc Gluconate 15 MG TABS Take 1 tablet by mouth daily      CELEBREX 200 MG capsule Take 1 capsule (200 mg total) by mouth daily 90 capsule 0     No current facility-administered medications for this visit        Current Outpatient Prescriptions on File Prior to Visit   Medication Sig    ALPRAZolam (XANAX) 0 25 mg tablet Take 1 tablet (0 25 mg total) by mouth 2 (two) times a day as needed for anxiety    ascorbic acid (VITAMIN C) 250 mg tablet Take 1 tablet by mouth daily    calcium-vitamin D (OSCAL-500) 500-400 MG-UNIT per tablet Take by mouth    Cholecalciferol (VITAMIN D3) 2000 units capsule Take by mouth    coenzyme Q-10 100 MG capsule Take by mouth  conjugated estrogens (PREMARIN) 1 25 mg tablet Take 1 tablet by mouth daily    Cranberry 400 MG CAPS Take by mouth    isosorbide mononitrate (ISMO,MONOKET) 20 mg tablet 20 mg in AM and 20 mg in PM    levothyroxine 112 mcg tablet Take 1 tablet by mouth daily    losartan (COZAAR) 50 mg tablet Take 0 5 tablets by mouth daily    montelukast (SINGULAIR) 10 mg tablet Take 1 tablet by mouth daily    moxifloxacin (VIGAMOX) 0 5 % ophthalmic solution Apply to eye    olopatadine HCl (PATADAY) 0 2 % opth drops Apply to eye    omeprazole (PRILOSEC) 20 mg delayed release capsule Take 20 mg by mouth    pyridoxine (VITAMIN B6) 100 mg tablet Take by mouth    triamterene-hydrochlorothiazide (MAXZIDE-25) 37 5-25 mg per tablet Take 1 tablet by mouth daily    vitamin E 100 UNIT capsule Take 15 mg by mouth    Vitamin E 200 units TABS Take 1 tablet by mouth daily    Zinc Gluconate 15 MG TABS Take 1 tablet by mouth daily     No current facility-administered medications on file prior to visit  She is allergic to aspirin; codeine polt-chlorphen polt er; etanercept; lorazepam; morphine; oxycodone; penicillins; povidone iodine; and prednisone       Review of Systems   Constitutional: Positive for fatigue  Negative for fever  Respiratory: Negative for cough  Cardiovascular: Negative for chest pain  Endocrine: Positive for cold intolerance  Musculoskeletal: Positive for arthralgias, back pain, neck pain and neck stiffness  Skin: Positive for color change  Objective:      /70 (BP Location: Right arm, Patient Position: Sitting, Cuff Size: Large)   Pulse 87   Temp (!) 97 3 °F (36 3 °C) (Temporal)   Resp 16   Ht 4' 10" (1 473 m)   Wt 53 5 kg (118 lb)   SpO2 94%   BMI 24 66 kg/m²          Physical Exam   Constitutional: She is cooperative     Musculoskeletal:   Degenerative changes bilateral feet with minimal padding on the bottom of the feet bilaterally; redness of bilateral feet; 2+ pulses DP and PT bilaterally   Neurological: She is alert  Nursing note and vitals reviewed

## 2018-03-22 NOTE — ASSESSMENT & PLAN NOTE
She does have venous stasis changes bilateral feet  She has very little questioning into her feet which is likely affecting her pain  Discussed following up with Podiatry  Since she has good pulses in both of her feet, significant arterial issues are unlikely  Discussed with her arterial Doppler if symptoms persist or worsen

## 2018-03-22 NOTE — ASSESSMENT & PLAN NOTE
Continue the Celebrex  She has been on numerous medications and has not tolerated them  She is only tolerated and responded to the brand name Celebrex  She had been treated with numerous medications such as Feldene, Voltaren, and Enbrel  She had erosive gastritis with the Feldene and the Voltaren  She had a reaction to the Enbrel with increased shortness of breath and subsequent pneumothorax  She had tried the generic Celebrex but this was not effective

## 2018-03-30 DIAGNOSIS — C91.10 CHRONIC LYMPHOCYTIC LEUKEMIA OF B-CELL TYPE NOT HAVING ACHIEVED REMISSION (HCC): ICD-10-CM

## 2018-04-12 DIAGNOSIS — I10 ESSENTIAL HYPERTENSION: Primary | ICD-10-CM

## 2018-04-12 RX ORDER — TRIAMTERENE AND HYDROCHLOROTHIAZIDE 37.5; 25 MG/1; MG/1
TABLET ORAL
Qty: 90 TABLET | Refills: 1 | Status: SHIPPED | OUTPATIENT
Start: 2018-04-12 | End: 2018-10-02 | Stop reason: SDUPTHER

## 2018-04-20 DIAGNOSIS — I10 BENIGN ESSENTIAL HYPERTENSION: Primary | ICD-10-CM

## 2018-04-20 RX ORDER — LOSARTAN POTASSIUM 50 MG/1
TABLET ORAL
Qty: 45 TABLET | Refills: 2 | Status: SHIPPED | OUTPATIENT
Start: 2018-04-20 | End: 2019-01-15 | Stop reason: SDUPTHER

## 2018-04-30 DIAGNOSIS — Z78.0 POSTMENOPAUSAL: Primary | ICD-10-CM

## 2018-04-30 NOTE — TELEPHONE ENCOUNTER
Pt called stating Celebrex once a day is not helping  She stated in the past she was on twice a day    Past (looks like it was in 2016 twice daily)

## 2018-05-01 ENCOUNTER — APPOINTMENT (OUTPATIENT)
Dept: LAB | Facility: HOSPITAL | Age: 83
End: 2018-05-01
Attending: INTERNAL MEDICINE
Payer: MEDICARE

## 2018-05-01 DIAGNOSIS — M48.00 SPINAL STENOSIS: ICD-10-CM

## 2018-05-01 DIAGNOSIS — M47.812 SPONDYLOSIS OF CERVICAL REGION WITHOUT MYELOPATHY OR RADICULOPATHY: ICD-10-CM

## 2018-05-01 DIAGNOSIS — E03.9 HYPOTHYROIDISM: ICD-10-CM

## 2018-05-01 DIAGNOSIS — C91.10 CHRONIC LYMPHOCYTIC LEUKEMIA OF B-CELL TYPE NOT HAVING ACHIEVED REMISSION (HCC): ICD-10-CM

## 2018-05-01 DIAGNOSIS — I10 ESSENTIAL (PRIMARY) HYPERTENSION: ICD-10-CM

## 2018-05-01 DIAGNOSIS — M06.9 RHEUMATOID ARTHRITIS INVOLVING MULTIPLE SITES, UNSPECIFIED RHEUMATOID FACTOR PRESENCE: ICD-10-CM

## 2018-05-01 DIAGNOSIS — E87.1 HYPO-OSMOLALITY AND HYPONATREMIA (CODE): ICD-10-CM

## 2018-05-01 DIAGNOSIS — E78.5 HYPERLIPIDEMIA: ICD-10-CM

## 2018-05-01 LAB
25(OH)D3 SERPL-MCNC: 49 NG/ML (ref 30–100)
ALBUMIN SERPL BCP-MCNC: 3.4 G/DL (ref 3.5–5)
ALP SERPL-CCNC: 59 U/L (ref 46–116)
ALT SERPL W P-5'-P-CCNC: 23 U/L (ref 12–78)
ANION GAP SERPL CALCULATED.3IONS-SCNC: 9 MMOL/L (ref 4–13)
AST SERPL W P-5'-P-CCNC: 26 U/L (ref 5–45)
BASOPHILS # BLD AUTO: 0.03 THOUSANDS/ΜL (ref 0–0.1)
BASOPHILS NFR BLD AUTO: 0 % (ref 0–1)
BILIRUB SERPL-MCNC: 0.5 MG/DL (ref 0.2–1)
BUN SERPL-MCNC: 24 MG/DL (ref 5–25)
CALCIUM SERPL-MCNC: 9 MG/DL (ref 8.3–10.1)
CHLORIDE SERPL-SCNC: 96 MMOL/L (ref 100–108)
CHOLEST SERPL-MCNC: 249 MG/DL (ref 50–200)
CO2 SERPL-SCNC: 30 MMOL/L (ref 21–32)
CREAT SERPL-MCNC: 1.29 MG/DL (ref 0.6–1.3)
EOSINOPHIL # BLD AUTO: 0.15 THOUSAND/ΜL (ref 0–0.61)
EOSINOPHIL NFR BLD AUTO: 1 % (ref 0–6)
ERYTHROCYTE [DISTWIDTH] IN BLOOD BY AUTOMATED COUNT: 12.4 % (ref 11.6–15.1)
GFR SERPL CREATININE-BSD FRML MDRD: 38 ML/MIN/1.73SQ M
GLUCOSE P FAST SERPL-MCNC: 77 MG/DL (ref 65–99)
HCT VFR BLD AUTO: 36.8 % (ref 34.8–46.1)
HDLC SERPL-MCNC: 94 MG/DL (ref 40–60)
HGB BLD-MCNC: 12.7 G/DL (ref 11.5–15.4)
LDLC SERPL CALC-MCNC: 123 MG/DL (ref 0–100)
LYMPHOCYTES # BLD AUTO: 8.46 THOUSANDS/ΜL (ref 0.6–4.47)
LYMPHOCYTES NFR BLD AUTO: 61 % (ref 14–44)
MCH RBC QN AUTO: 30.6 PG (ref 26.8–34.3)
MCHC RBC AUTO-ENTMCNC: 34.5 G/DL (ref 31.4–37.4)
MCV RBC AUTO: 89 FL (ref 82–98)
MONOCYTES # BLD AUTO: 0.62 THOUSAND/ΜL (ref 0.17–1.22)
MONOCYTES NFR BLD AUTO: 4 % (ref 4–12)
NEUTROPHILS # BLD AUTO: 4.86 THOUSANDS/ΜL (ref 1.85–7.62)
NEUTS SEG NFR BLD AUTO: 34 % (ref 43–75)
NONHDLC SERPL-MCNC: 155 MG/DL
PLATELET # BLD AUTO: 201 THOUSANDS/UL (ref 149–390)
PMV BLD AUTO: 11.7 FL (ref 8.9–12.7)
POTASSIUM SERPL-SCNC: 3.8 MMOL/L (ref 3.5–5.3)
PROT SERPL-MCNC: 6.8 G/DL (ref 6.4–8.2)
RBC # BLD AUTO: 4.15 MILLION/UL (ref 3.81–5.12)
SODIUM SERPL-SCNC: 135 MMOL/L (ref 136–145)
TRIGL SERPL-MCNC: 158 MG/DL
TSH SERPL DL<=0.05 MIU/L-ACNC: 0.3 UIU/ML (ref 0.36–3.74)
WBC # BLD AUTO: 14.12 THOUSAND/UL (ref 4.31–10.16)

## 2018-05-01 PROCEDURE — 85025 COMPLETE CBC W/AUTO DIFF WBC: CPT

## 2018-05-01 PROCEDURE — 82306 VITAMIN D 25 HYDROXY: CPT

## 2018-05-01 PROCEDURE — 36415 COLL VENOUS BLD VENIPUNCTURE: CPT

## 2018-05-01 PROCEDURE — 80053 COMPREHEN METABOLIC PANEL: CPT

## 2018-05-01 PROCEDURE — 80061 LIPID PANEL: CPT

## 2018-05-01 PROCEDURE — 84443 ASSAY THYROID STIM HORMONE: CPT

## 2018-05-04 ENCOUNTER — OFFICE VISIT (OUTPATIENT)
Dept: HEMATOLOGY ONCOLOGY | Facility: HOSPITAL | Age: 83
End: 2018-05-04
Payer: MEDICARE

## 2018-05-04 VITALS
RESPIRATION RATE: 16 BRPM | WEIGHT: 117 LBS | TEMPERATURE: 98.2 F | DIASTOLIC BLOOD PRESSURE: 72 MMHG | BODY MASS INDEX: 24.56 KG/M2 | OXYGEN SATURATION: 97 % | HEART RATE: 73 BPM | HEIGHT: 58 IN | SYSTOLIC BLOOD PRESSURE: 120 MMHG

## 2018-05-04 DIAGNOSIS — C91.10 CHRONIC LYMPHOCYTIC LEUKEMIA (HCC): Primary | ICD-10-CM

## 2018-05-04 PROCEDURE — 99213 OFFICE O/P EST LOW 20 MIN: CPT | Performed by: INTERNAL MEDICINE

## 2018-05-04 NOTE — PROGRESS NOTES
Danyelle Ordoñez  1933  [facility]  [facility] [address]  [place] [address]    No chief complaint on file  No history exists  History of Present Illness:  -No ref  provider found:    -Interval History: Been feeling tired lately  Review of Systems:  Review of Systems   Constitutional: Negative for appetite change, diaphoresis, fatigue and fever  HENT: Negative for sinus pain  Eyes: Negative for discharge  Respiratory: Negative for cough and shortness of breath  Cardiovascular: Negative for chest pain  Gastrointestinal: Negative for abdominal pain, constipation and diarrhea  Endocrine: Negative for cold intolerance  Genitourinary: Negative for difficulty urinating and hematuria  Musculoskeletal: Negative for joint swelling  Skin: Negative for rash  Allergic/Immunologic: Negative for environmental allergies  Neurological: Negative for dizziness and headaches  Hematological: Negative for adenopathy  Psychiatric/Behavioral: Negative for agitation         Patient Active Problem List   Diagnosis    Cervical osteoarthritis    Chronic kidney insufficiency    Chronic lymphocytic leukemia (HCC)    Chronic obstructive pulmonary disease (HCC)    CKD (chronic kidney disease) stage 3, GFR 30-59 ml/min    CLL (chronic lymphocytic leukemia) (HCC)    Combined disorders of mitral, aortic and tricuspid valves    Dyslipidemia    Pulmonary emphysema (HCC)    Esophageal reflux    History of thoracotomy    History of blood transfusion    History of D&C    History of lumpectomy of right breast    History of pneumothorax    Hx of emergency  section     (spontaneous vaginal delivery)    Hypertension    Hypokalemia    Hyponatremia    Hypothyroidism    Hemoptysis    Lumbar stenosis    Pulmonary disease    RA (rheumatoid arthritis) (HCC)    Osteoarthritis    Premature atrial contraction    Pulmonary artery hypertension (HCC)    Scoliosis    Spinal stenosis    Trochanteric bursitis of both hips    Venous stasis     No past medical history on file  No past surgical history on file  No family history on file  Social History     Social History    Marital status:      Spouse name: N/A    Number of children: N/A    Years of education: N/A     Occupational History    Not on file       Social History Main Topics    Smoking status: Not on file    Smokeless tobacco: Not on file    Alcohol use Not on file    Drug use: Unknown    Sexual activity: Not on file     Other Topics Concern    Not on file     Social History Narrative    No narrative on file       Current Outpatient Prescriptions:     ALPRAZolam (XANAX) 0 25 mg tablet, Take 1 tablet (0 25 mg total) by mouth 2 (two) times a day as needed for anxiety, Disp: 90 tablet, Rfl: 0    ascorbic acid (VITAMIN C) 250 mg tablet, Take 1 tablet by mouth daily, Disp: , Rfl:     calcium-vitamin D (OSCAL-500) 500-400 MG-UNIT per tablet, Take by mouth, Disp: , Rfl:     CELEBREX 200 MG capsule, Take 1 capsule (200 mg total) by mouth daily, Disp: 90 capsule, Rfl: 0    Cholecalciferol (VITAMIN D3) 2000 units capsule, Take by mouth, Disp: , Rfl:     coenzyme Q-10 100 MG capsule, Take by mouth, Disp: , Rfl:     conjugated estrogens (PREMARIN) 1 25 mg tablet, Take 1 tablet (1 25 mg total) by mouth daily, Disp: 90 tablet, Rfl: 3    Cranberry 400 MG CAPS, Take by mouth, Disp: , Rfl:     isosorbide mononitrate (ISMO,MONOKET) 20 mg tablet, 20 mg in AM and 20 mg in PM, Disp: , Rfl:     levothyroxine 112 mcg tablet, Take 1 tablet by mouth daily, Disp: , Rfl:     losartan (COZAAR) 50 mg tablet, TAKE ONE-HALF (1/2) TABLET DAILY, Disp: 45 tablet, Rfl: 2    montelukast (SINGULAIR) 10 mg tablet, Take 1 tablet by mouth daily, Disp: , Rfl:     moxifloxacin (VIGAMOX) 0 5 % ophthalmic solution, Apply to eye, Disp: , Rfl:    olopatadine HCl (PATADAY) 0 2 % opth drops, Apply to eye, Disp: , Rfl:     omeprazole (PRILOSEC) 20 mg delayed release capsule, Take 20 mg by mouth, Disp: , Rfl:     pyridoxine (VITAMIN B6) 100 mg tablet, Take by mouth, Disp: , Rfl:     triamterene-hydrochlorothiazide (MAXZIDE-25) 37 5-25 mg per tablet, TAKE 1 TABLET DAILY AS DIRECTED, Disp: 90 tablet, Rfl: 1    vitamin E 100 UNIT capsule, Take 15 mg by mouth, Disp: , Rfl:     Vitamin E 200 units TABS, Take 1 tablet by mouth daily, Disp: , Rfl:     Zinc Gluconate 15 MG TABS, Take 1 tablet by mouth daily, Disp: , Rfl:   Allergies   Allergen Reactions    Aspirin     Codeine Polt-Chlorphen Polt Er     Etanercept     Lorazepam     Morphine     Oxycodone     Penicillins     Povidone Iodine     Prednisone      There were no vitals filed for this visit  Physical Exam   Constitutional: She is oriented to person, place, and time  She appears well-developed  HENT:   Head: Normocephalic  Eyes: Pupils are equal, round, and reactive to light  Neck: Neck supple  Cardiovascular: Normal rate  No murmur heard  Pulmonary/Chest: No respiratory distress  She has no wheezes  She has no rales  Abdominal: Soft  She exhibits no distension  There is no tenderness  There is no rebound  Musculoskeletal: She exhibits no edema  Lymphadenopathy:     She has no cervical adenopathy  Neurological: She is alert and oriented to person, place, and time  She displays normal reflexes  Skin: Skin is warm  No rash noted  Psychiatric: She has a normal mood and affect  Thought content normal            Performance Status: ECOG/Zubrod/WHO: 0 - Asymptomatic    Labs:  CBC, Coags, BMP, Mg, Phos     Imaging  No results found  I reviewed the above laboratory and imaging data  Discussion/Summary:  In summary, this is a 60-year-old female history of CLL, stage 0  She continues under observation    She is centrally asymptomatic other than her rheumatoid arthritis  She has no physical exam or laboratory evidence of progressive lymphoproliferative disorder  Observation remains appropriate  TSH was slightly depressed compared to prior study  She will be calling her family doctor in the next few days for advice  I reviewed the above with the patient and her daughter  They voiced understanding and agreement

## 2018-05-04 NOTE — LETTER
May 4, 2018     [place], [address] [facility]    Patient: Shavonne Ordoñez   YOB: 1933   Date of Visit: 5/4/2018       Dear Dr Axel Mast:    Thank you for referring Leatha Katz to me for evaluation  Below are my notes for this consultation  If you have questions, please do not hesitate to call me  I look forward to following your patient along with you  Sincerely,        Stefano Villalobos DO        CC: No Recipients  Stefano Villalobos DO  5/4/2018  1:52 PM  Sign at close encounter  Leatha Katz  1933  [facility] [address]  [address]  [place] [address] [address]    No chief complaint on file  No history exists  History of Present Illness:  -No ref  provider found:    -Interval History: Been feeling tired lately  Review of Systems:  Review of Systems   Constitutional: Negative for appetite change, diaphoresis, fatigue and fever  HENT: Negative for sinus pain  Eyes: Negative for discharge  Respiratory: Negative for cough and shortness of breath  Cardiovascular: Negative for chest pain  Gastrointestinal: Negative for abdominal pain, constipation and diarrhea  Endocrine: Negative for cold intolerance  Genitourinary: Negative for difficulty urinating and hematuria  Musculoskeletal: Negative for joint swelling  Skin: Negative for rash  Allergic/Immunologic: Negative for environmental allergies  Neurological: Negative for dizziness and headaches  Hematological: Negative for adenopathy  Psychiatric/Behavioral: Negative for agitation         Patient Active Problem List   Diagnosis    Cervical osteoarthritis    Chronic kidney insufficiency    Chronic lymphocytic leukemia (HCC)    Chronic obstructive pulmonary disease (HCC)    CKD (chronic kidney disease) stage 3, GFR 30-59 ml/min    [facility]  Combined disorders of mitral, aortic and tricuspid valves    Dyslipidemia    Pulmonary emphysema (HCC)    Esophageal reflux    History of thoracotomy    History of blood transfusion    History of D&C    History of lumpectomy of right breast    History of pneumothorax    Hx of emergency  section     (spontaneous vaginal delivery)    Hypertension    Hypokalemia    Hyponatremia    Hypothyroidism    Hemoptysis    Lumbar stenosis    Pulmonary disease    RA (rheumatoid arthritis) (HCC)    Osteoarthritis    Premature atrial contraction    Pulmonary artery hypertension (HCC)    Scoliosis    Spinal stenosis    Trochanteric bursitis of both hips    Venous stasis     No past medical history on file  No past surgical history on file  No family history on file  Social History     Social History    Marital status:      Spouse name: N/A    Number of children: N/A    Years of education: N/A     Occupational History    Not on file       Social History Main Topics    Smoking status: Not on file    Smokeless tobacco: Not on file    Alcohol use Not on file    Drug use: Unknown    Sexual activity: Not on file     Other Topics Concern    Not on file     Social History Narrative    No narrative on file       Current Outpatient Prescriptions:     ALPRAZolam (XANAX) 0 25 mg tablet, Take 1 tablet (0 25 mg total) by mouth 2 (two) times a day as needed for anxiety, Disp: 90 tablet, Rfl: 0    ascorbic acid (VITAMIN C) 250 mg tablet, Take 1 tablet by mouth daily, Disp: , Rfl:     calcium-vitamin D (OSCAL-500) 500-400 MG-UNIT per tablet, Take by mouth, Disp: , Rfl:     CELEBREX 200 MG capsule, Take 1 capsule (200 mg total) by mouth daily, Disp: 90 capsule, Rfl: 0    Cholecalciferol (VITAMIN D3) 2000 units capsule, Take by mouth, Disp: , Rfl:     coenzyme Q-10 100 MG capsule, Take by mouth, Disp: , Rfl:     conjugated estrogens (PREMARIN) 1 25 mg tablet, Take 1 tablet (1 25 mg total) by mouth daily, Disp: 90 tablet, Rfl: 3    Cranberry 400 MG CAPS, Take by mouth, Disp: , Rfl:     isosorbide mononitrate (ISMO,MONOKET) 20 mg tablet, 20 mg in AM and 20 mg in PM, Disp: , Rfl:     levothyroxine 112 mcg tablet, Take 1 tablet by mouth daily, Disp: , Rfl:     losartan (COZAAR) 50 mg tablet, TAKE ONE-HALF (1/2) TABLET DAILY, Disp: 45 tablet, Rfl: 2    montelukast (SINGULAIR) 10 mg tablet, Take 1 tablet by mouth daily, Disp: , Rfl:     moxifloxacin (VIGAMOX) 0 5 % ophthalmic solution, Apply to eye, Disp: , Rfl:     olopatadine HCl (PATADAY) 0 2 % opth drops, Apply to eye, Disp: , Rfl:     omeprazole (PRILOSEC) 20 mg delayed release capsule, Take 20 mg by mouth, Disp: , Rfl:     pyridoxine (VITAMIN B6) 100 mg tablet, Take by mouth, Disp: , Rfl:     triamterene-hydrochlorothiazide (MAXZIDE-25) 37 5-25 mg per tablet, TAKE 1 TABLET DAILY AS DIRECTED, Disp: 90 tablet, Rfl: 1    vitamin E 100 UNIT capsule, Take 15 mg by mouth, Disp: , Rfl:     Vitamin E 200 units TABS, Take 1 tablet by mouth daily, Disp: , Rfl:     Zinc Gluconate 15 MG TABS, Take 1 tablet by mouth daily, Disp: , Rfl:   Allergies   Allergen Reactions    Aspirin     Codeine Polt-Chlorphen Polt Er     Etanercept     Lorazepam     Morphine     Oxycodone     Penicillins     Povidone Iodine     Prednisone      There were no vitals filed for this visit  Physical Exam   Constitutional: She is oriented to person, place, and time  She appears well-developed  HENT:   Head: Normocephalic  Eyes: Pupils are equal, round, and reactive to light  Neck: Neck supple  Cardiovascular: Normal rate  No murmur heard  Pulmonary/Chest: No respiratory distress  She has no wheezes  She has no rales  Abdominal: Soft  She exhibits no distension  There is no tenderness  There is no rebound  Musculoskeletal: She exhibits no edema  Lymphadenopathy:     She has no cervical adenopathy     Neurological: She is alert and oriented to person, place, and time  She displays normal reflexes  Skin: Skin is warm  No rash noted  Psychiatric: She has a normal mood and affect  Thought content normal            Performance Status: ECOG/Zubrod/WHO: 0 - Asymptomatic    Labs:  CBC, Coags, BMP, Mg, Phos     Imaging  No results found  I reviewed the above laboratory and imaging data  Discussion/Summary:  In summary, this is a 29-year-old female history of CLL, stage 0  She continues under observation  She is centrally asymptomatic other than her rheumatoid arthritis  She has no physical exam or laboratory evidence of progressive lymphoproliferative disorder  Observation remains appropriate  I reviewed the above with the patient and her daughter  They voiced understanding and agreement

## 2018-05-11 ENCOUNTER — OFFICE VISIT (OUTPATIENT)
Dept: CARDIOLOGY CLINIC | Facility: HOSPITAL | Age: 83
End: 2018-05-11
Payer: MEDICARE

## 2018-05-11 VITALS
SYSTOLIC BLOOD PRESSURE: 144 MMHG | WEIGHT: 116.8 LBS | DIASTOLIC BLOOD PRESSURE: 67 MMHG | HEART RATE: 78 BPM | BODY MASS INDEX: 25.2 KG/M2 | HEIGHT: 57 IN

## 2018-05-11 DIAGNOSIS — I08.3 COMBINED DISORDERS OF MITRAL, AORTIC AND TRICUSPID VALVES: ICD-10-CM

## 2018-05-11 DIAGNOSIS — I27.21 PULMONARY ARTERY HYPERTENSION (HCC): ICD-10-CM

## 2018-05-11 DIAGNOSIS — I49.1 PREMATURE ATRIAL CONTRACTION: ICD-10-CM

## 2018-05-11 DIAGNOSIS — E78.5 DYSLIPIDEMIA: ICD-10-CM

## 2018-05-11 DIAGNOSIS — I10 BENIGN ESSENTIAL HYPERTENSION: ICD-10-CM

## 2018-05-11 PROCEDURE — 93000 ELECTROCARDIOGRAM COMPLETE: CPT | Performed by: INTERNAL MEDICINE

## 2018-05-11 PROCEDURE — 99214 OFFICE O/P EST MOD 30 MIN: CPT | Performed by: INTERNAL MEDICINE

## 2018-05-11 RX ORDER — ISOSORBIDE DINITRATE 5 MG/1
5 TABLET ORAL 2 TIMES DAILY
Qty: 180 TABLET | Refills: 3 | Status: SHIPPED | OUTPATIENT
Start: 2018-05-11 | End: 2018-06-20

## 2018-05-11 NOTE — PROGRESS NOTES
Cardiology Follow Up    Ying Ordoñez  1933  [id number]  [address] [place] [address]    1  Benign essential hypertension     2  Dyslipidemia     3  Combined disorders of mitral, aortic and tricuspid valves     4  Pulmonary artery hypertension (HCC)  POCT ECG   5  Premature atrial contraction         Discussion/Summary:      Mrs Ordoñez is a pleasant 20-year-old female who presents to the office today for routine follow-up  Since her shortness of breath with exertion is unchanged  She did feel better on isosorbide but it gave her a mental fog which resolved after discontinuation  She is agreeable to beginning a lower dose and she was given a prescription for 5 mg twice daily  Her blood pressure control continues to appear inadequate  During her next visit I will discuss escalation of therapy  I will ask that she undergo a repeat echocardiogram after next visit for reassessment of her mitral valve disease  She continues to decline statin therapy  I will see her back in the office in three months for ongoing evaluation  Interval History:   Mrs Ordoñez is a pleasant 20-year-old female who presents to the office for routine follow-up       Since her last visit she has been physically been feeling the same  She does note shortness of breath with activities which is unchanged since her last visit  She does note that she has become more sedentary due to progressive issues with her arthritis  She denies exertional chest discomfort  She denies symptoms of heart failure  She denies lightheadedness, dizziness, syncope or presyncope  She denies symptoms of palpitations or claudication  She remains off of the isosorbide and has not seen Dr Sobeida Luque in follow-up regarding her pulmonary hypertension       Problem List     Cervical osteoarthritis Chronic kidney insufficiency    Chronic lymphocytic leukemia (HCC)    Chronic obstructive pulmonary disease (HCC)    CKD (chronic kidney disease) stage 3, GFR 30-59 ml/min    CLL (chronic lymphocytic leukemia) (HCC)    Combined disorders of mitral, aortic and tricuspid valves    Overview Signed 3/20/2018  2:03 PM by Marcos Osgood, MD     Description: Mild aortic regurgitation, moderate to severe mitral regurgitation, severe tricuspid regurgitation  Dyslipidemia    Pulmonary emphysema (HCC)    Esophageal reflux    History of thoracotomy    History of blood transfusion    History of D&C    History of lumpectomy of right breast    History of pneumothorax    Hx of emergency  section     (spontaneous vaginal delivery)    Hypertension    Hypokalemia    Hyponatremia    Hypothyroidism    Hemoptysis    Lumbar stenosis    Pulmonary disease    RA (rheumatoid arthritis) (HCC)    Osteoarthritis    Premature atrial contraction    Pulmonary artery hypertension (HCC)    Scoliosis    Spinal stenosis    Trochanteric bursitis of both hips    Venous stasis        Past Medical History:   Diagnosis Date    Dyslipidemia     Hypertension     Premature atrial contraction      Social History     Social History    Marital status:      Spouse name: N/A    Number of children: N/A    Years of education: N/A     Occupational History    Not on file  Social History Main Topics    Smoking status: Not on file    Smokeless tobacco: Not on file    Alcohol use Not on file    Drug use: Unknown    Sexual activity: Not on file     Other Topics Concern    Not on file     Social History Narrative    No narrative on file      No family history on file  No past surgical history on file      Current Outpatient Prescriptions:     ALPRAZolam (XANAX) 0 25 mg tablet, Take 1 tablet (0 25 mg total) by mouth 2 (two) times a day as needed for anxiety, Disp: 90 tablet, Rfl: 0    ascorbic acid (VITAMIN C) 250 mg tablet, Take 1 tablet by mouth daily, Disp: , Rfl:     calcium-vitamin D (OSCAL-500) 500-400 MG-UNIT per tablet, Take by mouth, Disp: , Rfl:     CELEBREX 200 MG capsule, Take 1 capsule (200 mg total) by mouth daily, Disp: 90 capsule, Rfl: 0    Cholecalciferol (VITAMIN D3) 2000 units capsule, Take by mouth, Disp: , Rfl:     coenzyme Q-10 100 MG capsule, Take by mouth, Disp: , Rfl:     conjugated estrogens (PREMARIN) 1 25 mg tablet, Take 1 tablet (1 25 mg total) by mouth daily, Disp: 90 tablet, Rfl: 3    Cranberry 400 MG CAPS, Take by mouth, Disp: , Rfl:     levothyroxine 112 mcg tablet, Take 1 tablet by mouth daily, Disp: , Rfl:     losartan (COZAAR) 50 mg tablet, TAKE ONE-HALF (1/2) TABLET DAILY, Disp: 45 tablet, Rfl: 2    montelukast (SINGULAIR) 10 mg tablet, Take 1 tablet by mouth daily, Disp: , Rfl:     moxifloxacin (VIGAMOX) 0 5 % ophthalmic solution, Apply to eye, Disp: , Rfl:     olopatadine HCl (PATADAY) 0 2 % opth drops, Apply to eye, Disp: , Rfl:     omeprazole (PRILOSEC) 20 mg delayed release capsule, Take 20 mg by mouth, Disp: , Rfl:     pyridoxine (VITAMIN B6) 100 mg tablet, Take 50 mg by mouth daily  , Disp: , Rfl:     triamterene-hydrochlorothiazide (MAXZIDE-25) 37 5-25 mg per tablet, TAKE 1 TABLET DAILY AS DIRECTED, Disp: 90 tablet, Rfl: 1    Vitamin E 200 units TABS, Take 1 tablet by mouth daily, Disp: , Rfl:     Zinc Gluconate 15 MG TABS, Take 1 tablet by mouth daily, Disp: , Rfl:   Allergies   Allergen Reactions    Aspirin     Codeine Polt-Chlorphen Polt Er     Etanercept     Lorazepam     Morphine     Oxycodone     Penicillins     Povidone Iodine     Prednisone        Labs:     Chemistry        Component Value Date/Time     (L) 05/01/2018 1501     (L) 07/21/2015 1427    K 3 8 05/01/2018 1501    K 4 1 07/21/2015 1427    CL 96 (L) 05/01/2018 1501    CL 95 (L) 07/21/2015 1427    CO2 30 05/01/2018 1501    CO2 28 5 07/21/2015 1427    BUN 24 05/01/2018 1501    BUN 25 07/21/2015 1427    CREATININE 1 29 05/01/2018 1501    CREATININE 1 11 07/21/2015 1427        Component Value Date/Time    CALCIUM 9 0 05/01/2018 1501    CALCIUM 8 9 07/21/2015 1427    ALKPHOS 59 05/01/2018 1501    ALKPHOS 51 07/21/2015 1427    AST 26 05/01/2018 1501    AST 25 07/21/2015 1427    ALT 23 05/01/2018 1501    ALT 22 07/21/2015 1427    BILITOT 0 50 05/01/2018 1501    BILITOT 0 39 07/21/2015 1427            Lab Results   Component Value Date    CHOL 249 (H) 05/01/2018    CHOL 237 (H) 12/04/2017    CHOL 252 (H) 08/09/2017     Lab Results   Component Value Date    HDL 94 (H) 05/01/2018    HDL 94 (H) 12/04/2017    HDL 90 (H) 08/09/2017     Lab Results   Component Value Date    LDLCALC 123 (H) 05/01/2018    LDLCALC 106 (H) 12/04/2017    LDLCALC 126 (H) 08/09/2017     Lab Results   Component Value Date    TRIG 158 (H) 05/01/2018    TRIG 186 (H) 12/04/2017    TRIG 178 (H) 08/09/2017     No components found for: CHOLHDL    Imaging: No results found  ECG:  Normal sinus rhythm with premature atrial and ventricular complexes, poor anterior R wave progression      Review of Systems   Cardiovascular: Positive for dyspnea on exertion  Negative for chest pain, leg swelling, near-syncope, orthopnea, palpitations, paroxysmal nocturnal dyspnea and syncope  All other systems reviewed and are negative  Vitals:    05/11/18 1423   BP: 144/67   Pulse: 78     Vitals:    05/11/18 1423   Weight: 53 kg (116 lb 12 8 oz)     Height: 4' 9" (144 8 cm)   Body mass index is 25 28 kg/m²      Physical Exam:   General appearance:  Appears stated age, alert, well appearing and in no distress  HEENT:  PERRLA, EOMI, no scleral icterus, no conjunctival pallor  NECK:  Supple, No elevated JVP, no thyromegaly, no carotid bruits  HEART:  Regular rate and rhythm, normal S1/S2, no S3/S4, no murmur or rub  LUNGS:  Clear to auscultation bilaterally  ABDOMEN:  Soft, non-tender, positive bowel sounds, no rebound or guarding, no organomegaly EXTREMITIES:  + pedal edema  VASCULAR:  Normal pedal pulses   SKIN: No lesions or rashes on exposed skin  NEURO:  CN II-XII intact, no focal deficits

## 2018-06-01 DIAGNOSIS — E03.9 HYPOTHYROIDISM, UNSPECIFIED TYPE: Primary | ICD-10-CM

## 2018-06-01 RX ORDER — LEVOTHYROXINE SODIUM 112 UG/1
TABLET ORAL
Qty: 90 TABLET | Refills: 2 | Status: SHIPPED | OUTPATIENT
Start: 2018-06-01 | End: 2019-02-28

## 2018-06-18 DIAGNOSIS — M06.9 RHEUMATOID ARTHRITIS INVOLVING MULTIPLE SITES, UNSPECIFIED RHEUMATOID FACTOR PRESENCE: ICD-10-CM

## 2018-06-18 NOTE — TELEPHONE ENCOUNTER
Received a call from Kyla Mcardle stating that she needs a refill on her celebrex 200mg capsules  Per pt she states this last one was ordered for 1 qd, but before I was bid  Per [employer] it was bid, and was changed to qd  Please advise on bid or qd  Verified script  Please advise

## 2018-06-20 ENCOUNTER — OFFICE VISIT (OUTPATIENT)
Dept: INTERNAL MEDICINE CLINIC | Facility: CLINIC | Age: 83
End: 2018-06-20
Payer: MEDICARE

## 2018-06-20 VITALS
TEMPERATURE: 97.7 F | HEIGHT: 57 IN | HEART RATE: 103 BPM | WEIGHT: 117 LBS | DIASTOLIC BLOOD PRESSURE: 60 MMHG | BODY MASS INDEX: 25.24 KG/M2 | SYSTOLIC BLOOD PRESSURE: 150 MMHG | OXYGEN SATURATION: 96 %

## 2018-06-20 DIAGNOSIS — I10 BENIGN ESSENTIAL HYPERTENSION: Primary | ICD-10-CM

## 2018-06-20 DIAGNOSIS — I27.21 PULMONARY ARTERY HYPERTENSION (HCC): ICD-10-CM

## 2018-06-20 DIAGNOSIS — E03.9 HYPOTHYROIDISM, UNSPECIFIED TYPE: ICD-10-CM

## 2018-06-20 DIAGNOSIS — M06.9 RHEUMATOID ARTHRITIS INVOLVING MULTIPLE SITES, UNSPECIFIED RHEUMATOID FACTOR PRESENCE: ICD-10-CM

## 2018-06-20 PROCEDURE — 99214 OFFICE O/P EST MOD 30 MIN: CPT | Performed by: FAMILY MEDICINE

## 2018-06-20 NOTE — PROGRESS NOTES
Assessment/Plan:    Hypothyroidism  TSH on the lower side  She thought this may be contributing to her fatigue but it is just the opposite  Continue current dose of levothyroxine  Recheck a TSH in about 8 weeks  Pulmonary artery hypertension ([place] [address] )  Continue to follow up with Cardiology on a regular basis  Follow up with Pulmonary regularly  Watch for any worsening of respiratory issues  Benign essential hypertension  Blood pressure mildly elevated  She did half to rush to get here and encountered traffic  This may be playing a role in her blood pressure being elevated  Watch salt intake  Try to follow blood pressure at home if possible  Will have her follow up in about 2-3 months for recheck or sooner if needed  For now continue the losartan and the triamterene/hydrochlorothiazide  RA (rheumatoid arthritis) ([place] [address] )  Has significant issues with the rheumatoid arthritis especially into the back and neck  Discussed with her following up with rheumatology again  It has been awhile since she has seen them  Continue with the Celebrex since this does help somewhat  Try to remain as active as possible  Discussed physical therapy or starting with the wellness program again since she did feel significantly better while doing this  Dyslipidemia  Cholesterol is elevated but she declines statin medication  Watch diet  Increase fiber  Remain as active as possible  Diagnoses and all orders for this visit:    Benign essential hypertension  -     CBC and differential; Future  -     Comprehensive metabolic panel; Future  -     Lipid panel; Future  -     TSH, 3rd generation; Future    Hypothyroidism, unspecified type  -     Lipid panel; Future  -     TSH, 3rd generation; Future    Pulmonary artery hypertension (HCC)  -     Comprehensive metabolic panel; Future  -     Lipid panel;  Future    Rheumatoid arthritis involving multiple sites, unspecified rheumatoid factor presence ([place] [address] )  -     Comprehensive metabolic panel; Future  -     Walker        Orders and recommendations as noted above  Reviewed recent laboratory testing with her  Prescription given for a walker which may help with her back symptoms  Declines mammogram and bone density  Will have her follow up in about 2-3 months or sooner if needed  Subjective:      Patient ID: Taylor Buckley is a 80 y o  female  She presents for routine follow-up  Has generally been doing about the same  Admits that she has been feeling more tired  States that she does go to bed relatively late  She is able to basically push through to complete the activity she needs to during the day  Denies any issues with sleep once she falls asleep  Appetite has been good  Does notice some difficulty with swallowing at times  Feels that is related to her neck arthritis  She had been told that in the past by Gastroenterology  We denies any abdominal pain  Denies any nausea, vomiting, or diarrhea  Pain into her low back and neck have worsened  Feels that the humidity worsens or symptoms  Denies any bowel or bladder issues with the back pain  Still with stressors at home with her children with their persistent psychiatric issues especially her 1 son  Tolerating her losartan and triamterene/hydrochlorothiazide without difficulty  Denies any headaches or localized weakness  Denies any chest pain or palpitations  She has had follow-up with Cardiology  Follows up with them about every 6 months  Continues take her levothyroxine on a regular basis  Has some allergy symptoms at times  [place]  The following portions of the patient's history were reviewed and updated as appropriate:   She  has a past medical history of Dyslipidemia; Hypertension; and Premature atrial contraction    She   Patient Active Problem List    Diagnosis Date Noted    Venous stasis 03/21/2018    CKD (chronic kidney disease) stage 3, GFR 30-59 ml/min 2018    CLL [facility]chronic [facility]ocytic leukemia) ([facility] ) 2018    History of thoracotomy 2018    History of D&C 2018    History of lumpectomy of right breast 2018    History of pneumothorax 2018    Hx of emergency  section 2018     (spontaneous vaginal delivery) 2018    Hemoptysis 2018    Pulmonary disease 2018    RA (rheumatoid arthritis) ([facility] ) 2018    Cervical osteoarthritis 2017    Hypokalemia 2017    Lumbar stenosis 2017    Hyponatremia 10/31/2016    Premature atrial contraction 2016    Scoliosis 2016    Trochanteric bursitis of both hips 2015    Dyslipidemia 2014    Combined disorders of mitral, aortic and tricuspid valves 2014    Chronic obstructive pulmonary disease ([facility] ) 2013    Pulmonary artery hypertension ([place] [address] ) 2013    Chronic lymphocytic leukemia ([place] [address] ) 2013    Pulmonary emphysema ([place] [address] ) 2013    Chronic kidney insufficiency 2012    Esophageal reflux 2012    Benign essential hypertension 2012    Hypothyroidism 2012    Osteoarthritis 2012    Spinal stenosis 2012    History of blood transfusion 1966     She  has no past surgical history on file  Her family history is not on file  She  has no tobacco, alcohol, and drug history on file    Current Outpatient Prescriptions   Medication Sig Dispense Refill    ALPRAZolam (XANAX) 0 25 mg tablet Take 1 tablet (0 25 mg total) by mouth 2 (two) times a day as needed for anxiety 90 tablet 0    ascorbic acid (VITAMIN C) 250 mg tablet Take 1 tablet by mouth daily      calcium-vitamin D (OSCAL-500) 500-400 MG-UNIT per tablet Take by mouth      CELEBREX 200 MG capsule Take 1 capsule (200 mg total) by mouth 2 (two) times a day 180 capsule 0    Cholecalciferol (VITAMIN D3) 2000 units capsule Take by mouth      coenzyme Q-10 100 MG capsule Take by mouth      conjugated estrogens (PREMARIN) 1 25 mg tablet Take 1 tablet (1 25 mg total) by mouth daily 90 tablet 3    Cranberry 400 MG CAPS Take by mouth      levothyroxine 112 mcg tablet TAKE 1 TABLET DAILY AS DIRECTED 90 tablet 2    losartan (COZAAR) 50 mg tablet TAKE ONE-HALF (1/2) TABLET DAILY 45 tablet 2    montelukast (SINGULAIR) 10 mg tablet Take 1 tablet by mouth daily      moxifloxacin (VIGAMOX) 0 5 % ophthalmic solution Apply to eye      olopatadine HCl (PATADAY) 0 2 % opth drops Apply to eye      omeprazole (PRILOSEC) 20 mg delayed release capsule Take 20 mg by mouth      pyridoxine (VITAMIN B6) 100 mg tablet Take 50 mg by mouth daily        triamterene-hydrochlorothiazide (MAXZIDE-25) 37 5-25 mg per tablet TAKE 1 TABLET DAILY AS DIRECTED 90 tablet 1    Vitamin E 200 units TABS Take 1 tablet by mouth daily      Zinc Gluconate 15 MG TABS Take 1 tablet by mouth daily       No current facility-administered medications for this visit        Current Outpatient Prescriptions on File Prior to Visit   Medication Sig    ALPRAZolam (XANAX) 0 25 mg tablet Take 1 tablet (0 25 mg total) by mouth 2 (two) times a day as needed for anxiety    ascorbic acid (VITAMIN C) 250 mg tablet Take 1 tablet by mouth daily    calcium-vitamin D (OSCAL-500) 500-400 MG-UNIT per tablet Take by mouth    CELEBREX 200 MG capsule Take 1 capsule (200 mg total) by mouth 2 (two) times a day    Cholecalciferol (VITAMIN D3) 2000 units capsule Take by mouth    coenzyme Q-10 100 MG capsule Take by mouth    conjugated estrogens (PREMARIN) 1 25 mg tablet Take 1 tablet (1 25 mg total) by mouth daily    Cranberry 400 MG CAPS Take by mouth    levothyroxine 112 mcg tablet TAKE 1 TABLET DAILY AS DIRECTED    losartan (COZAAR) 50 mg tablet TAKE ONE-HALF (1/2) TABLET DAILY    montelukast (SINGULAIR) 10 mg tablet Take 1 tablet by mouth daily    moxifloxacin (VIGAMOX) 0 5 % ophthalmic solution Apply to eye    olopatadine HCl (PATADAY) 0 2 % opth drops Apply to eye    omeprazole (PRILOSEC) 20 mg delayed release capsule Take 20 mg by mouth    pyridoxine (VITAMIN B6) 100 mg tablet Take 50 mg by mouth daily      triamterene-hydrochlorothiazide (MAXZIDE-25) 37 5-25 mg per tablet TAKE 1 TABLET DAILY AS DIRECTED    Vitamin E 200 units TABS Take 1 tablet by mouth daily    Zinc Gluconate 15 MG TABS Take 1 tablet by mouth daily     No current facility-administered medications on file prior to visit  She is allergic to aspirin; codeine polt-chlorphen polt er; etanercept; lorazepam; morphine; oxycodone; penicillins; povidone iodine; and prednisone       Review of Systems   Constitutional: Positive for fatigue  Negative for activity change, appetite change, chills and fever  HENT: Negative for congestion and rhinorrhea  Eyes: Negative for visual disturbance  Respiratory: Positive for shortness of breath  Negative for chest tightness and wheezing  Cardiovascular: Negative for chest pain, palpitations and leg swelling  Gastrointestinal: Negative for abdominal pain, blood in stool, diarrhea, nausea and vomiting  Endocrine: Negative for polydipsia, polyphagia and polyuria  Genitourinary: Negative for dysuria, frequency and urgency  Musculoskeletal: Positive for arthralgias, neck pain and neck stiffness  Negative for gait problem  Skin: Negative for color change  Neurological: Negative for dizziness and headaches  Hematological: Does not bruise/bleed easily  Psychiatric/Behavioral: Negative for confusion and sleep disturbance  The patient is not nervous/anxious  Objective:      /60 (BP Location: Right arm, Patient Position: Sitting, Cuff Size: Standard)   Pulse 103   Temp 97 7 °F (36 5 °C) (Temporal)   Ht 4' 9" (1 448 m)   Wt 53 1 kg (117 lb)   SpO2 96%   BMI 25 32 kg/m²          Physical Exam   Constitutional: She is oriented to person, place, and time  She is cooperative  No distress     HENT:   Head: Normocephalic and atraumatic  Mouth/Throat: Oropharynx is clear and moist    Eyes: Conjunctivae are normal  Pupils are equal, round, and reactive to light  Right eye exhibits no discharge  Left eye exhibits no discharge  Neck: No muscular tenderness present  Carotid bruit is not present  No thyromegaly present  Cardiovascular: Normal rate, regular rhythm and normal heart sounds  Exam reveals no gallop and no friction rub  No murmur heard  Pulmonary/Chest: No respiratory distress  She has decreased breath sounds  She has no wheezes  She has no rales  Abdominal: Bowel sounds are normal  She exhibits no distension  There is no tenderness  Musculoskeletal: She exhibits no edema  Degenerative changes into hands; some degenerative changes in knees; diffuse tenderness over the cervical area into the trapezius muscles   Lymphadenopathy:     She has no cervical adenopathy  Neurological: She is alert and oriented to person, place, and time  Skin: Skin is warm and dry  Psychiatric: She has a normal mood and affect  Her behavior is normal    Nursing note and vitals reviewed  Below is the patient's most recent value for Albumin, ALT, AST, BUN, Calcium, Chloride, Cholesterol, CO2, Creatinine, GFR, Glucose, HDL, Hematocrit, Hemoglobin, Hemoglobin A1C, LDL, Magnesium, Phosphorus, Platelets, Potassium, PSA, Sodium, Triglycerides, and WBC  Lab Results   Component Value Date    ALT 23 05/01/2018    AST 26 05/01/2018    BUN 24 05/01/2018    CALCIUM 9 0 05/01/2018    CL 96 (L) 05/01/2018    CHOL 249 (H) 05/01/2018    CO2 30 05/01/2018    CREATININE 1 29 05/01/2018    HDL 94 (H) 05/01/2018    HCT 36 8 05/01/2018    HGB 12 7 05/01/2018     05/01/2018    K 3 8 05/01/2018     (L) 05/01/2018    TRIG 158 (H) 05/01/2018    WBC 14 12 (H) 05/01/2018     Note: for a comprehensive list of the patient's lab results, access the Results Review activity

## 2018-07-01 NOTE — ASSESSMENT & PLAN NOTE
Continue to follow up with Cardiology on a regular basis  Follow up with Pulmonary regularly  Watch for any worsening of respiratory issues

## 2018-07-01 NOTE — ASSESSMENT & PLAN NOTE
Blood pressure mildly elevated  She did half to rush to get here and encountered traffic  This may be playing a role in her blood pressure being elevated  Watch salt intake  Try to follow blood pressure at home if possible  Will have her follow up in about 2-3 months for recheck or sooner if needed  For now continue the losartan and the triamterene/hydrochlorothiazide

## 2018-07-01 NOTE — ASSESSMENT & PLAN NOTE
Has significant issues with the rheumatoid arthritis especially into the back and neck  Discussed with her following up with rheumatology again  It has been awhile since she has seen them  Continue with the Celebrex since this does help somewhat  Try to remain as active as possible  Discussed physical therapy or starting with the wellness program again since she did feel significantly better while doing this

## 2018-07-01 NOTE — ASSESSMENT & PLAN NOTE
TSH on the lower side  She thought this may be contributing to her fatigue but it is just the opposite  Continue current dose of levothyroxine  Recheck a TSH in about 8 weeks

## 2018-07-01 NOTE — ASSESSMENT & PLAN NOTE
Cholesterol is elevated but she declines statin medication  Watch diet  Increase fiber  Remain as active as possible

## 2018-08-14 ENCOUNTER — OFFICE VISIT (OUTPATIENT)
Dept: CARDIOLOGY CLINIC | Facility: HOSPITAL | Age: 83
End: 2018-08-14
Payer: MEDICARE

## 2018-08-14 VITALS
SYSTOLIC BLOOD PRESSURE: 130 MMHG | DIASTOLIC BLOOD PRESSURE: 70 MMHG | HEIGHT: 57 IN | WEIGHT: 116 LBS | BODY MASS INDEX: 25.03 KG/M2 | HEART RATE: 69 BPM

## 2018-08-14 DIAGNOSIS — I08.3 COMBINED DISORDERS OF MITRAL, AORTIC AND TRICUSPID VALVES: ICD-10-CM

## 2018-08-14 DIAGNOSIS — I49.1 PREMATURE ATRIAL CONTRACTION: ICD-10-CM

## 2018-08-14 DIAGNOSIS — I10 BENIGN ESSENTIAL HYPERTENSION: Primary | ICD-10-CM

## 2018-08-14 DIAGNOSIS — E78.5 DYSLIPIDEMIA: ICD-10-CM

## 2018-08-14 DIAGNOSIS — I27.21 PULMONARY ARTERY HYPERTENSION (HCC): ICD-10-CM

## 2018-08-14 PROCEDURE — 99214 OFFICE O/P EST MOD 30 MIN: CPT | Performed by: INTERNAL MEDICINE

## 2018-08-14 NOTE — PROGRESS NOTES
Cardiology Follow Up    Rowdy Ordoñez  1933  [id number]  [address] [place] [address] [address]    1  Benign essential hypertension     2  Dyslipidemia     3  Combined disorders of mitral, aortic and tricuspid valves  Echo complete with contrast if indicated   4  Pulmonary artery hypertension ([address] )  Echo complete with contrast if indicated   5  Premature atrial contraction         Discussion/Summary:      Mrs Ordoñez is a pleasant 27-year-old female who presents to the office today for routine follow-up  Since her last visit her shortness of breath with exertion is somewhat better but persists  She did feel better on isosorbide but it gave her a mental fog which resolved after discontinuation  This occured even the lower dose  For now the medication will not be re-initiated  Her blood pressure control appears adequate  She monitors her blood pressure at home with acceptable readings  No changes were made to her regimen  I will ask that she undergo a repeat echocardiogram for reassessment of her mitral valve disease  She continues to decline statin therapy  I will see her back in the office in six months for ongoing evaluation  Interval History:   Mrs Ordoñez is a pleasant 27-year-old female who presents to the office for routine follow-up       Since her last visit she has been physically been feeling the same  She does note shortness of breath with activities which is slightly better since her last visit  She does note that she has become more sedentary due to progressive issues with her arthritis  She denies exertional chest discomfort  She denies symptoms of heart failure  She denies lightheadedness, dizziness, syncope or presyncope  She denies symptoms of palpitations or claudication      After her last visit isosorbide was resumed at a lower dose  This again cause mental fogginess and she discontinued the medication with improvement  After she discontinued the medication she realized it was helping her shortness of breath  Problem List     Cervical osteoarthritis    Chronic kidney insufficiency    Chronic lymphocytic leukemia (HCC)    Chronic obstructive pulmonary disease (HCC)    CKD (chronic kidney disease) stage 3, GFR 30-59 ml/min    CLL (chronic lymphocytic leukemia) (HCC)    Combined disorders of mitral, aortic and tricuspid valves    Overview Signed 3/20/2018  2:03 PM by Mariana Del Cid MD     Description: Mild aortic regurgitation, moderate to severe mitral regurgitation, severe tricuspid regurgitation  Dyslipidemia    Pulmonary emphysema (HCC)    Esophageal reflux    History of thoracotomy    History of blood transfusion    History of D&C    History of lumpectomy of right breast    History of pneumothorax    Hx of emergency  section     (spontaneous vaginal delivery)    Hypertension    Hypokalemia    Hyponatremia    Hypothyroidism    Hemoptysis    Lumbar stenosis    Pulmonary disease    RA (rheumatoid arthritis) (HCC)    Osteoarthritis    Premature atrial contraction    Pulmonary artery hypertension (HCC)    Scoliosis    Spinal stenosis    Trochanteric bursitis of both hips    Venous stasis        Past Medical History:   Diagnosis Date    Dyslipidemia     Hypertension     Premature atrial contraction      Social History     Social History    Marital status:      Spouse name: N/A    Number of children: N/A    Years of education: N/A     Occupational History    Not on file  Social History Main Topics    Smoking status: Never Smoker    Smokeless tobacco: Never Used    Alcohol use No    Drug use: No    Sexual activity: Not on file     Other Topics Concern    Not on file     Social History Narrative    No narrative on file      No family history on file    No past surgical history on file     Current Outpatient Prescriptions:     ALPRAZolam (XANAX) 0 25 mg tablet, Take 1 tablet (0 25 mg total) by mouth 2 (two) times a day as needed for anxiety, Disp: 90 tablet, Rfl: 0    ascorbic acid (VITAMIN C) 250 mg tablet, Take 1 tablet by mouth daily, Disp: , Rfl:     calcium-vitamin D (OSCAL-500) 500-400 MG-UNIT per tablet, Take by mouth, Disp: , Rfl:     CELEBREX 200 MG capsule, Take 1 capsule (200 mg total) by mouth 2 (two) times a day, Disp: 180 capsule, Rfl: 0    Cholecalciferol (VITAMIN D3) 2000 units capsule, Take by mouth, Disp: , Rfl:     coenzyme Q-10 100 MG capsule, Take by mouth, Disp: , Rfl:     conjugated estrogens (PREMARIN) 1 25 mg tablet, Take 1 tablet (1 25 mg total) by mouth daily, Disp: 90 tablet, Rfl: 3    Cranberry 400 MG CAPS, Take by mouth, Disp: , Rfl:     levothyroxine 112 mcg tablet, TAKE 1 TABLET DAILY AS DIRECTED, Disp: 90 tablet, Rfl: 2    losartan (COZAAR) 50 mg tablet, TAKE ONE-HALF (1/2) TABLET DAILY, Disp: 45 tablet, Rfl: 2    montelukast (SINGULAIR) 10 mg tablet, Take 1 tablet by mouth daily, Disp: , Rfl:     moxifloxacin (VIGAMOX) 0 5 % ophthalmic solution, Apply to eye, Disp: , Rfl:     olopatadine HCl (PATADAY) 0 2 % opth drops, Apply to eye, Disp: , Rfl:     omeprazole (PRILOSEC) 20 mg delayed release capsule, Take 20 mg by mouth, Disp: , Rfl:     pyridoxine (VITAMIN B6) 100 mg tablet, Take 50 mg by mouth daily  , Disp: , Rfl:     triamterene-hydrochlorothiazide (MAXZIDE-25) 37 5-25 mg per tablet, TAKE 1 TABLET DAILY AS DIRECTED, Disp: 90 tablet, Rfl: 1    Vitamin E 200 units TABS, Take 1 tablet by mouth daily, Disp: , Rfl:     Zinc Gluconate 15 MG TABS, Take 1 tablet by mouth daily, Disp: , Rfl:   Allergies   Allergen Reactions    Aspirin     Codeine Polt-Chlorphen Polt Er     Etanercept     Lorazepam     Morphine     Oxycodone     Penicillins     Povidone Iodine     Prednisone        Labs:     Chemistry        Component Value Date/Time  (L) 05/01/2018 1501     (L) 07/21/2015 1427    K 3 8 05/01/2018 1501    K 4 1 07/21/2015 1427    CL 96 (L) 05/01/2018 1501    CL 95 (L) 07/21/2015 1427    CO2 30 05/01/2018 1501    CO2 28 5 07/21/2015 1427    BUN 24 05/01/2018 1501    BUN 25 07/21/2015 1427    CREATININE 1 29 05/01/2018 1501    CREATININE 1 11 07/21/2015 1427        Component Value Date/Time    CALCIUM 9 0 05/01/2018 1501    CALCIUM 8 9 07/21/2015 1427    ALKPHOS 59 05/01/2018 1501    ALKPHOS 51 07/21/2015 1427    AST 26 05/01/2018 1501    AST 25 07/21/2015 1427    ALT 23 05/01/2018 1501    ALT 22 07/21/2015 1427    BILITOT 0 50 05/01/2018 1501    BILITOT 0 39 07/21/2015 1427            Lab Results   Component Value Date    CHOL 249 (H) 05/01/2018    CHOL 237 (H) 12/04/2017    CHOL 252 (H) 08/09/2017     Lab Results   Component Value Date    HDL 94 (H) 05/01/2018    HDL 94 (H) 12/04/2017    HDL 90 (H) 08/09/2017     Lab Results   Component Value Date    LDLCALC 123 (H) 05/01/2018    LDLCALC 106 (H) 12/04/2017    LDLCALC 126 (H) 08/09/2017     Lab Results   Component Value Date    TRIG 158 (H) 05/01/2018    TRIG 186 (H) 12/04/2017    TRIG 178 (H) 08/09/2017     No components found for: CHOLHDL    Imaging: No results found  Review of Systems   Cardiovascular: Positive for dyspnea on exertion  Negative for chest pain, leg swelling, near-syncope, orthopnea, palpitations, paroxysmal nocturnal dyspnea and syncope  All other systems reviewed and are negative  Vitals:    08/14/18 1356   BP: 130/70   Pulse:      Vitals:    08/14/18 1311   Weight: 52 6 kg (116 lb)     Height: 4' 9" (144 8 cm)   Body mass index is 25 1 kg/m²      Physical Exam:   General appearance:  Appears stated age, alert, well appearing and in no distress  HEENT:  PERRLA, EOMI, no scleral icterus, no conjunctival pallor  NECK:  Supple, No elevated JVP, no thyromegaly, no carotid bruits  HEART:  Regular rate and rhythm with ectopy, normal S1/S2, no S3/S4, no murmur or rub  LUNGS:  Clear to auscultation bilaterally  ABDOMEN:  Soft, non-tender, positive bowel sounds, no rebound or guarding, no organomegaly   EXTREMITIES:  + pedal edema  VASCULAR:  Normal pedal pulses   SKIN: No lesions or rashes on exposed skin  NEURO:  CN II-XII intact, no focal deficits

## 2018-09-05 ENCOUNTER — HOSPITAL ENCOUNTER (OUTPATIENT)
Dept: NON INVASIVE DIAGNOSTICS | Facility: HOSPITAL | Age: 83
Discharge: HOME | End: 2018-09-05
Attending: INTERNAL MEDICINE
Payer: MEDICARE

## 2018-09-05 DIAGNOSIS — I27.21 PULMONARY ARTERY HYPERTENSION (HCC): ICD-10-CM

## 2018-09-05 DIAGNOSIS — I08.3 COMBINED DISORDERS OF MITRAL, AORTIC AND TRICUSPID VALVES: ICD-10-CM

## 2018-09-05 PROCEDURE — 93306 TTE W/DOPPLER COMPLETE: CPT | Performed by: INTERNAL MEDICINE

## 2018-09-05 PROCEDURE — 93306 TTE W/DOPPLER COMPLETE: CPT

## 2018-09-20 DIAGNOSIS — M06.9 RHEUMATOID ARTHRITIS INVOLVING MULTIPLE SITES, UNSPECIFIED RHEUMATOID FACTOR PRESENCE: ICD-10-CM

## 2018-10-02 DIAGNOSIS — F41.9 ANXIETY: ICD-10-CM

## 2018-10-02 DIAGNOSIS — I10 ESSENTIAL HYPERTENSION: ICD-10-CM

## 2018-10-02 RX ORDER — ALPRAZOLAM 0.25 MG/1
0.25 TABLET ORAL 2 TIMES DAILY PRN
Qty: 90 TABLET | Refills: 0 | Status: SHIPPED | OUTPATIENT
Start: 2018-10-02 | End: 2019-04-03 | Stop reason: SDUPTHER

## 2018-10-02 RX ORDER — TRIAMTERENE AND HYDROCHLOROTHIAZIDE 37.5; 25 MG/1; MG/1
1 TABLET ORAL DAILY
Qty: 90 TABLET | Refills: 0 | Status: SHIPPED | OUTPATIENT
Start: 2018-10-02 | End: 2019-01-02 | Stop reason: SDUPTHER

## 2018-10-02 NOTE — TELEPHONE ENCOUNTER
Received a call from [facility] requesting a refill on xanax 0 25mg bid prn and maxzide qd  Verified sig and pharmacy  Aware of 48 hr fill policy  Please advise

## 2018-10-23 ENCOUNTER — OFFICE VISIT (OUTPATIENT)
Dept: INTERNAL MEDICINE CLINIC | Facility: CLINIC | Age: 83
End: 2018-10-23
Payer: MEDICARE

## 2018-10-23 VITALS
WEIGHT: 117.7 LBS | TEMPERATURE: 97.8 F | BODY MASS INDEX: 25.39 KG/M2 | HEART RATE: 73 BPM | OXYGEN SATURATION: 99 % | HEIGHT: 57 IN | SYSTOLIC BLOOD PRESSURE: 140 MMHG | DIASTOLIC BLOOD PRESSURE: 76 MMHG

## 2018-10-23 DIAGNOSIS — M41.9 SCOLIOSIS, UNSPECIFIED SCOLIOSIS TYPE, UNSPECIFIED SPINAL REGION: ICD-10-CM

## 2018-10-23 DIAGNOSIS — Z00.00 MEDICARE ANNUAL WELLNESS VISIT, SUBSEQUENT: ICD-10-CM

## 2018-10-23 DIAGNOSIS — E78.5 DYSLIPIDEMIA: ICD-10-CM

## 2018-10-23 DIAGNOSIS — J44.9 CHRONIC OBSTRUCTIVE PULMONARY DISEASE, UNSPECIFIED COPD TYPE (HCC): ICD-10-CM

## 2018-10-23 DIAGNOSIS — M06.9 RHEUMATOID ARTHRITIS INVOLVING MULTIPLE SITES, UNSPECIFIED RHEUMATOID FACTOR PRESENCE: ICD-10-CM

## 2018-10-23 DIAGNOSIS — I10 BENIGN ESSENTIAL HYPERTENSION: ICD-10-CM

## 2018-10-23 DIAGNOSIS — M48.00 SPINAL STENOSIS, UNSPECIFIED SPINAL REGION: ICD-10-CM

## 2018-10-23 DIAGNOSIS — Z91.81 RISK FOR FALLS: ICD-10-CM

## 2018-10-23 DIAGNOSIS — M40.209 KYPHOSIS, UNSPECIFIED KYPHOSIS TYPE, UNSPECIFIED SPINAL REGION: Primary | ICD-10-CM

## 2018-10-23 DIAGNOSIS — E03.9 HYPOTHYROIDISM, UNSPECIFIED TYPE: ICD-10-CM

## 2018-10-23 DIAGNOSIS — N18.30 CKD (CHRONIC KIDNEY DISEASE) STAGE 3, GFR 30-59 ML/MIN (HCC): ICD-10-CM

## 2018-10-23 PROCEDURE — G0439 PPPS, SUBSEQ VISIT: HCPCS | Performed by: FAMILY MEDICINE

## 2018-10-23 PROCEDURE — 99214 OFFICE O/P EST MOD 30 MIN: CPT | Performed by: FAMILY MEDICINE

## 2018-10-23 NOTE — PROGRESS NOTES
Assessment/Plan:    Hypothyroidism  For now will continue the same dose of the levothyroxine  She did not have her laboratory testing done prior to this visit  A new slip was given to her  Encouraged her to get the old lab slip done now and then likely have another repeat laboratory testing done prior to her next visit  Discussed with her that some of her fatigue symptoms may be related to her thyroid so encouraged her to get this done as soon as possible  Chronic obstructive pulmonary disease (HCC)  Breathing symptoms are likely multifactorial with her also having known pulmonary hypertension  Watch for worsening  Encouraged her to get flu shot but she declines  Benign essential hypertension  Blood pressure controlled  Continue the triamterene/hydrochlorothiazide and the losartan  Stay well hydrated  Watch for orthostatics symptoms  Change positions slowly  Watch salt intake  RA (rheumatoid arthritis) (HCC)  Rheumatoid arthritis symptoms have worsened somewhat especially into her legs and her back  She is requesting a referral to see Orthopedics for this  She has seen them in the past   Referral given  Tylenol Arthritis recommended  Avoid anti-inflammatories with her history of some transient renal insufficiency  Be careful to avoid falls  Discussed with her following up again with Rheumatology but this is difficult because of traveling any distances  Hopefully Rheumatology will be available closer to home in the near future  Scoliosis  Will refer her to the back specialist as noted  Continue to follow up with chiropractor which seems to help somewhat  Tylenol Arthritis if needed  CKD (chronic kidney disease) stage 3, GFR 30-59 ml/min  Has had some mild renal insufficiency in the past   She has not had recent laboratory testing done  Encouraged her to have this done in the near future so this can be followed  Try to stay well hydrated    Avoid nephrotoxins  Dyslipidemia  Cholesterol is elevated but she declines statin medication  Watch diet  Increase fiber  Remain as active as possible  Spinal stenosis  Has had some worsening symptoms which are likely related to the kyphosis, scoliosis, rheumatoid arthritis coupled with the spinal stenosis  Will refer her to the back specialist that she had seen before  Discussed with her pain management but she wishes to see Orthopedics 1st        Diagnoses and all orders for this visit:    Kyphosis, unspecified kyphosis type, unspecified spinal region  -     Vitamin D 25 hydroxy; Future  -     Ambulatory referral to Orthopedic Surgery; Future    Scoliosis, unspecified scoliosis type, unspecified spinal region  -     Vitamin D 25 hydroxy; Future  -     Ambulatory referral to Orthopedic Surgery; Future    Benign essential hypertension  -     CBC and differential; Future  -     Comprehensive metabolic panel; Future  -     Lipid panel; Future  -     Vitamin D 25 hydroxy; Future    Rheumatoid arthritis involving multiple sites, unspecified rheumatoid factor presence (HCC)  -     Vitamin D 25 hydroxy; Future    Hypothyroidism, unspecified type  -     TSH, 3rd generation; Future  -     Vitamin D 25 hydroxy; Future    Chronic obstructive pulmonary disease, unspecified COPD type ([facility] )  -     CBC and differential; Future  -     Vitamin D 25 hydroxy; Future    CKD (chronic kidney disease) stage 3, GFR 30-59 ml/min (HCC)  -     CBC and differential; Future  -     Vitamin D 25 hydroxy; Future    Dyslipidemia  -     Lipid panel; Future  -     Vitamin D 25 hydroxy; Future    Spinal stenosis, unspecified spinal region  -     Vitamin D 25 hydroxy; Future  -     Ambulatory referral to Orthopedic Surgery; Future        Orders and recommendations as noted above  Be careful to avoid falls  Discussed with her following up with chiropractor    Stressed her the importance of getting her laboratory testing done since it has been quite awhile  Declines flu shot  Up-to-date on her pneumonia vaccines  Declines mammogram and bone density  Will have her follow up in about 2-3 months or sooner if needed  Subjective:      Patient ID: Parker Miranda is a 80 y o  female  She presents for routine follow-up as well as Medicare wellness  Has been having some worsening especially with pain complaints  Her arthritis symptoms have worsened especially into her legs and her back  Pain has been waking her at night  Has difficulty finding a comfortable position  Has been having some spasms into her back  She has noted that her kyphosis seems to be worse with her being unable to stand up or sit up straight at times  She is requesting a referral to follow up with the back specialist that she is see before, Dr Juan Pablo Menchaca  Pain is mostly over the mid back  Has not been taking anything on a regular basis for this  Cannot take anti-inflammatories because of the renal insufficiency  Breathing has been relatively stable  Has not had any recent worsening  Has known emphysematous changes as well as known pulmonary hypertension  Does follow-up with Cardiology regarding this  Tolerating her triamterene/hydrochlorothiazide as well as her losartan without difficulty  Denies any headaches or localized weakness  Denies any chest pain or palpitations  Appetite has been relatively stable  Denies any nausea, vomiting, or diarrhea  Denies any recent orthostatics symptoms  Tries to stay well hydrated  Has been having some pain into her heels which she feels is radiating from her back at times  Denies any current urinary symptoms  Still under a lot of stress at home since she has 3 children living with her with significant psychiatric illnesses  Continues to feel very tired  Has been taking her levothyroxine regularly but has not had recent laboratory testing done          The following portions of the patient's history were reviewed and updated as appropriate:   She  has a past medical history of Carpal tunnel syndrome; Cubital tunnel syndrome on left; Dyslipidemia; Ectopic heartbeats; Hypertension; Malignant melanoma of skin ([address] ); Premature atrial contraction; and Transient ischemic attack  She   Patient Active Problem List    Diagnosis Date Noted    Kyphosis 10/23/2018    Venous stasis 2018    CKD (chronic kidney disease) stage 3, GFR 30-59 ml/min ([facility]) 2018    CLL [facility]ch[facility]) [facility] ) 2018    History of thoracotomy 2018    History of D&C 2018    History of lumpectomy of right breast 2018    History of pneumothorax 2018    Hx of emergency  section 2018     (spontaneous vaginal delivery) 2018    Hemoptysis 2018    Pulmonary disease 2018    RA (rheumatoid arthritis) ([facility] ) 2018    Cervical osteoarthritis 2017    Hypokalemia 2017    Lumbar stenosis 2017    Hyponatremia 10/31/2016    Premature atrial contraction 2016    Scoliosis 2016    Trochanteric bursitis of both hips 2015    Dyslipidemia 2014    Combined disorders of mitral, aortic and tricuspid valves 2014    Chronic obstructive pulmonary disease ([facility] ) 2013    Pulmonary artery hypertension ([facility] ) 2013    Chronic lymphocytic leukemia ([facility] ) 2013    Pulmonary emphysema ([facility] ) 2013    Chronic kidney insufficiency 2012    Esophageal reflux 2012    Benign essential hypertension 2012    Hypothyroidism 2012    Osteoarthritis 2012    Spinal stenosis 2012    History of blood transfusion 1966     She  has a past surgical history that includes Appendectomy; Breast lumpectomy; Cataract extraction;  section; Anterior release vertebral body w/ posterior fusion; Hysterectomy; Lumbar laminectomy; NEUROPLASTY; Oophorectomy;  Thoracoscopy w/ talc pleurodesis; and Tonsillectomy  Her family history includes Cancer in her family; Colon cancer in her mother; Diabetes in her maternal uncle and paternal aunt; Heart disease in her father; Hypertension in her mother; Rheum arthritis in her father; Stroke in her family  She  reports that she has never smoked  She has never used smokeless tobacco  She reports that she drinks alcohol  She reports that she does not use drugs  Current Outpatient Prescriptions   Medication Sig Dispense Refill    ALPRAZolam (XANAX) 0 25 mg tablet Take 1 tablet (0 25 mg total) by mouth 2 (two) times a day as needed for anxiety 90 tablet 0    ascorbic acid (VITAMIN C) 250 mg tablet Take 1 tablet by mouth daily      calcium-vitamin D (OSCAL-500) 500-400 MG-UNIT per tablet Take by mouth      CELEBREX 200 MG capsule TAKE 1 CAPSULE TWICE A  capsule 0    Cholecalciferol (VITAMIN D3) 2000 units capsule Take by mouth      coenzyme Q-10 100 MG capsule Take by mouth      conjugated estrogens (PREMARIN) 1 25 mg tablet Take 1 tablet (1 25 mg total) by mouth daily 90 tablet 3    Cranberry 400 MG CAPS Take by mouth      levothyroxine 112 mcg tablet TAKE 1 TABLET DAILY AS DIRECTED 90 tablet 2    losartan (COZAAR) 50 mg tablet TAKE ONE-HALF (1/2) TABLET DAILY 45 tablet 2    montelukast (SINGULAIR) 10 mg tablet Take 1 tablet by mouth daily      moxifloxacin (VIGAMOX) 0 5 % ophthalmic solution Apply to eye      olopatadine HCl (PATADAY) 0 2 % opth drops Apply to eye      omeprazole (PRILOSEC) 20 mg delayed release capsule Take 20 mg by mouth      pyridoxine (VITAMIN B6) 100 mg tablet Take 50 mg by mouth daily        triamterene-hydrochlorothiazide (MAXZIDE-25) 37 5-25 mg per tablet Take 1 tablet by mouth daily 90 tablet 0    Vitamin E 200 units TABS Take 1 tablet by mouth daily      Zinc Gluconate 15 MG TABS Take 1 tablet by mouth daily       No current facility-administered medications for this visit        Current Outpatient Prescriptions on File Prior to Visit   Medication Sig    ALPRAZolam (XANAX) 0 25 mg tablet Take 1 tablet (0 25 mg total) by mouth 2 (two) times a day as needed for anxiety    ascorbic acid (VITAMIN C) 250 mg tablet Take 1 tablet by mouth daily    calcium-vitamin D (OSCAL-500) 500-400 MG-UNIT per tablet Take by mouth    CELEBREX 200 MG capsule TAKE 1 CAPSULE TWICE A DAY    Cholecalciferol (VITAMIN D3) 2000 units capsule Take by mouth    coenzyme Q-10 100 MG capsule Take by mouth    conjugated estrogens (PREMARIN) 1 25 mg tablet Take 1 tablet (1 25 mg total) by mouth daily    Cranberry 400 MG CAPS Take by mouth    levothyroxine 112 mcg tablet TAKE 1 TABLET DAILY AS DIRECTED    losartan (COZAAR) 50 mg tablet TAKE ONE-HALF (1/2) TABLET DAILY    montelukast (SINGULAIR) 10 mg tablet Take 1 tablet by mouth daily    moxifloxacin (VIGAMOX) 0 5 % ophthalmic solution Apply to eye    olopatadine HCl (PATADAY) 0 2 % opth drops Apply to eye    omeprazole (PRILOSEC) 20 mg delayed release capsule Take 20 mg by mouth    pyridoxine (VITAMIN B6) 100 mg tablet Take 50 mg by mouth daily      triamterene-hydrochlorothiazide (MAXZIDE-25) 37 5-25 mg per tablet Take 1 tablet by mouth daily    Vitamin E 200 units TABS Take 1 tablet by mouth daily    Zinc Gluconate 15 MG TABS Take 1 tablet by mouth daily     No current facility-administered medications on file prior to visit  She is allergic to aspirin; codeine polt-chlorphen polt er; etanercept; lorazepam; morphine; oxycodone; penicillins; povidone iodine; and prednisone       Review of Systems   Constitutional: Positive for fatigue  Negative for activity change, appetite change, chills and fever  HENT: Negative for congestion and rhinorrhea  Eyes: Negative for visual disturbance  Respiratory: Negative for cough, chest tightness, shortness of breath and wheezing  Cardiovascular: Negative for chest pain, palpitations and leg swelling     Gastrointestinal: Negative for abdominal pain, blood in stool, diarrhea, nausea and vomiting  Endocrine: Negative for polydipsia, polyphagia and polyuria  Genitourinary: Negative for dysuria, frequency and urgency  Musculoskeletal: Positive for arthralgias, back pain, neck pain and neck stiffness  Negative for gait problem  Skin: Negative for color change  Neurological: Negative for dizziness, weakness, light-headedness and headaches  Hematological: Does not bruise/bleed easily  Psychiatric/Behavioral: Negative for confusion, dysphoric mood and sleep disturbance  The patient is not nervous/anxious  Objective:      /76 (BP Location: Right arm, Patient Position: Sitting, Cuff Size: Standard)   Pulse 73   Temp 97 8 °F (36 6 °C) (Oral)   Ht 4' 9" (1 448 m)   Wt 53 4 kg (117 lb 11 2 oz)   SpO2 99%   BMI 25 47 kg/m²          Physical Exam   Constitutional: She is oriented to person, place, and time  She is cooperative  No distress  HENT:   Head: Normocephalic and atraumatic  Mouth/Throat: Oropharynx is clear and moist    Eyes: Pupils are equal, round, and reactive to light  Conjunctivae are normal  Right eye exhibits no discharge  Left eye exhibits no discharge  Neck: No muscular tenderness present  Carotid bruit is not present  No thyromegaly present  Cardiovascular: Normal rate, regular rhythm and normal heart sounds  Exam reveals no gallop and no friction rub  No murmur heard  Pulmonary/Chest: No respiratory distress  She has decreased breath sounds  She has no wheezes  She has no rales  Abdominal: Bowel sounds are normal  She exhibits no distension  There is no tenderness  Musculoskeletal: She exhibits no edema  Degenerative changes into hands; some degenerative changes in knees; kyphotic; diffuse tenderness over mid to lower back   Lymphadenopathy:     She has no cervical adenopathy  Neurological: She is alert and oriented to person, place, and time  Skin: Skin is warm and dry     Psychiatric: She has a normal mood and affect  Her behavior is normal    Nursing note and vitals reviewed  Below is the patient's most recent value for Albumin, ALT, AST, BUN, Calcium, Chloride, Cholesterol, CO2, Creatinine, GFR, Glucose, HDL, Hematocrit, Hemoglobin, Hemoglobin A1C, LDL, Magnesium, Phosphorus, Platelets, Potassium, PSA, Sodium, Triglycerides, and WBC  Lab Results   Component Value Date    ALT 23 05/01/2018    AST 26 05/01/2018    BUN 24 05/01/2018    CALCIUM 9 0 05/01/2018    CL 96 (L) 05/01/2018    CHOL 235 07/21/2015    CO2 30 05/01/2018    CREATININE 1 29 05/01/2018    HDL 94 (H) 05/01/2018    HCT 36 8 05/01/2018    HGB 12 7 05/01/2018     05/01/2018    K 3 8 05/01/2018     (L) 07/21/2015    TRIG 158 (H) 05/01/2018    WBC 14 12 (H) 05/01/2018     Note: for a comprehensive list of the patient's lab results, access the Results Review activity

## 2018-10-23 NOTE — PROGRESS NOTES
Assessment and Plan:    Problem List Items Addressed This Visit     Chronic obstructive pulmonary disease ([address] )     Breathing symptoms are likely multifactorial with her also having known pulmonary hypertension  Watch for worsening  Encouraged her to get flu shot but she declines  Relevant Orders    CBC and differential    Vitamin D 25 hydroxy    CKD (chronic kidney disease) stage 3, GFR 30-59 ml/min ([facility])     Has had some mild renal insufficiency in the past   She has not had recent laboratory testing done  Encouraged her to have this done in the near future so this can be followed  Try to stay well hydrated  Avoid nephrotoxins  Relevant Orders    CBC and differential    Vitamin D 25 hydroxy    Dyslipidemia     Cholesterol is elevated but she declines statin medication  Watch diet  Increase fiber  Remain as active as possible  Relevant Orders    Lipid panel    Vitamin D 25 hydroxy    Benign essential hypertension     Blood pressure controlled  Continue the triamterene/hydrochlorothiazide and the losartan  Stay well hydrated  Watch for orthostatics symptoms  Change positions slowly  Watch salt intake  Relevant Orders    CBC and differential    Comprehensive metabolic panel    Lipid panel    Vitamin D 25 hydroxy    Hypothyroidism     For now will continue the same dose of the levothyroxine  She did not have her laboratory testing done prior to this visit  A new slip was given to her  Encouraged her to get the old lab slip done now and then likely have another repeat laboratory testing done prior to her next visit  Discussed with her that some of her fatigue symptoms may be related to her thyroid so encouraged her to get this done as soon as possible  Relevant Orders    TSH, 3rd generation    Vitamin D 25 hydroxy    RA (rheumatoid arthritis) ([facility])     Rheumatoid arthritis symptoms have worsened somewhat especially into her legs and her back    She is requesting a referral to see Orthopedics for this  She has seen them in the past   Referral given  Tylenol Arthritis recommended  Avoid anti-inflammatories with her history of some transient renal insufficiency  Be careful to avoid falls  Discussed with her following up again with Rheumatology but this is difficult because of traveling any distances  Hopefully Rheumatology will be available closer to home in the near future  Relevant Orders    Vitamin D 25 hydroxy    Scoliosis     Will refer her to the back specialist as noted  Continue to follow up with chiropractor which seems to help somewhat  Tylenol Arthritis if needed  Relevant Orders    Vitamin D 25 hydroxy    Ambulatory referral to Orthopedic Surgery    Spinal stenosis     Has had some worsening symptoms which are likely related to the kyphosis, scoliosis, rheumatoid arthritis coupled with the spinal stenosis  Will refer her to the back specialist that she had seen before  Discussed with her pain management but she wishes to see Orthopedics 1st          Relevant Orders    Vitamin D 25 hydroxy    Ambulatory referral to Orthopedic Surgery    Kyphosis - Primary    Relevant Orders    Vitamin D 25 hydroxy    Ambulatory referral to Orthopedic Surgery      Other Visit Diagnoses     Medicare annual wellness visit, subsequent        Risk for falls            Health Maintenance Due   Topic Date Due    Pneumococcal PPSV23/PCV13 65+ Years / High and Highest Risk (2 of 2 - PPSV23) 12/26/2016    INFLUENZA VACCINE  07/01/2018         HPI:  Stan Menchaca is a 80 y o  female here for her Subsequent Wellness Visit      Patient Active Problem List   Diagnosis    Cervical osteoarthritis    Chronic kidney insufficiency    Chronic lymphocytic leukemia (HCC)    Chronic obstructive pulmonary disease (HCC)    CKD (chronic kidney disease) stage 3, GFR 30-59 ml/min (HCC)    CLL (chronic lymphocytic leukemia) (HCC)    Combined disorders of mitral, aortic and tricuspid valves    Dyslipidemia    Pulmonary emphysema (HCC)    Esophageal reflux    History of thoracotomy    History of blood transfusion    History of D&C    History of lumpectomy of right breast    History of pneumothorax    Hx of emergency  section     (spontaneous vaginal delivery)    Benign essential hypertension    Hypokalemia    Hyponatremia    Hypothyroidism    Hemoptysis    Lumbar stenosis    Pulmonary disease    RA (rheumatoid arthritis) (HCC)    Osteoarthritis    Premature atrial contraction    Pulmonary artery hypertension (HCC)    Scoliosis    Spinal stenosis    Trochanteric bursitis of both hips    Venous stasis    Kyphosis     Past Medical History:   Diagnosis Date    Carpal tunnel syndrome     unspecified , lateraly ,   resolved 2014    Cubital tunnel syndrome on left     resolved 2014    Dyslipidemia     Ectopic heartbeats     resolved 2014    Hypertension     Malignant melanoma of [facility] [address] )     resolved 2014    Premature atrial contraction     Transient ischemic attack     resolved 2014     Past Surgical History:   Procedure Laterality Date    ANTERIOR RELEASE VERTEBRAL BODY W/ POSTERIOR FUSION      APPENDECTOMY      BREAST LUMPECTOMY      CATARACT EXTRACTION       SECTION      HYSTERECTOMY      LUMBAR LAMINECTOMY      NEUROPLASTY      decompression medial nerve at carpal tunnel     OOPHORECTOMY      THORACOSCOPY W/ TALC PLEURODESIS      TONSILLECTOMY       Family History   Problem Relation Age of Onset    Colon cancer Mother     Hypertension Mother     Heart disease Father     Rheum arthritis Father     Cancer Family     Stroke Family     Diabetes Maternal Uncle     Diabetes Paternal Aunt      History   Smoking Status    Never Smoker   Smokeless Tobacco    Never Used     History   Alcohol Use    Yes     Comment: social      History   Drug Use No       Current Outpatient Prescriptions Medication Sig Dispense Refill    ALPRAZolam (XANAX) 0 25 mg tablet Take 1 tablet (0 25 mg total) by mouth 2 (two) times a day as needed for anxiety 90 tablet 0    ascorbic acid (VITAMIN C) 250 mg tablet Take 1 tablet by mouth daily      calcium-vitamin D (OSCAL-500) 500-400 MG-UNIT per tablet Take by mouth      CELEBREX 200 MG capsule TAKE 1 CAPSULE TWICE A  capsule 0    Cholecalciferol (VITAMIN D3) 2000 units capsule Take by mouth      coenzyme Q-10 100 MG capsule Take by mouth      conjugated estrogens (PREMARIN) 1 25 mg tablet Take 1 tablet (1 25 mg total) by mouth daily 90 tablet 3    Cranberry 400 MG CAPS Take by mouth      levothyroxine 112 mcg tablet TAKE 1 TABLET DAILY AS DIRECTED 90 tablet 2    losartan (COZAAR) 50 mg tablet TAKE ONE-HALF (1/2) TABLET DAILY 45 tablet 2    montelukast (SINGULAIR) 10 mg tablet Take 1 tablet by mouth daily      moxifloxacin (VIGAMOX) 0 5 % ophthalmic solution Apply to eye      olopatadine HCl (PATADAY) 0 2 % opth drops Apply to eye      omeprazole (PRILOSEC) 20 mg delayed release capsule Take 20 mg by mouth      pyridoxine (VITAMIN B6) 100 mg tablet Take 50 mg by mouth daily        triamterene-hydrochlorothiazide (MAXZIDE-25) 37 5-25 mg per tablet Take 1 tablet by mouth daily 90 tablet 0    Vitamin E 200 units TABS Take 1 tablet by mouth daily      Zinc Gluconate 15 MG TABS Take 1 tablet by mouth daily       No current facility-administered medications for this visit        Allergies   Allergen Reactions    Aspirin     Codeine Polt-Chlorphen Polt Er     Etanercept     Lorazepam     Morphine     Oxycodone     Penicillins     Povidone Iodine     Prednisone      Immunization History   Administered Date(s) Administered    Influenza 10/31/2016    Influenza Split High Dose Preservative Free IM 10/31/2016    Pneumococcal Conjugate 13-Valent 10/31/2016    Pneumococcal Polysaccharide PPV23 10/31/2016    Tdap 10/31/2016       Patient Care Team:  Petra Doe MD as PCP - DO Shy Stratton, Orquidea Ibrahim MD    Medicare Screening Tests and Risk Assessments:  Chanell Lorenzo is here for her Subsequent Wellness visit  Last Medicare Wellness visit information reviewed, patient interviewed and updates made to the record today  Health Risk Assessment:  Patient rates overall health as fair  Patient feels that their physical health rating is Slightly worse  Eyesight was rated as Same  Hearing was rated as Same  Patient feels that their emotional and mental health rating is Same  Pain experienced by patient in the last 7 days has been A lot  Patient's pain rating has been 10/10  Patient states that she has experienced no weight loss or gain in last 6 months  (Additional comments: Arthritis is so bad that she cant sleep at night  See routine note  Back pain has worsened significantly )    Emotional/Mental Health:  Patient has been feeling nervous/anxious  PHQ-9 Depression Screening:    Frequency of the following problems over the past two weeks:      1  Little interest or pleasure in doing things: 0 - not at all      2  Feeling down, depressed, or hopeless: 0 - not at all  PHQ-2 Score: 0          Broken Bones/Falls: Fall Risk Assessment:    In the past year, patient has experienced: No history of falling in past year          Bladder/Bowel:  Patient has not leaked urine accidently in the last six months  Patient reports no loss of bowel control  Immunizations:  Patient has not had a flu vaccination within the last year  Patient has received a pneumonia shot  Patient has not received a shingles shot  Patient has received tetanus/diphtheria shot  Home Safety:  Patient does not have trouble with stairs inside or outside of their home  Patient currently reports that there are no safety hazards present in home, working smoke alarms, working carbon monoxide detectors        Preventative Screenings:   Breast cancer screening performed, colon cancer screen completed, cholesterol screen completed, glaucoma eye exam completed, (Additional Comments: She has had mammograms in the past but declines mammograms as well as bone densities at present )    Nutrition:  Current diet: Regular with servings of the following:    Medications:  Patient is currently taking over-the-counter supplements  Patient is able to manage medications  Lifestyle Choices:  Patient reports no tobacco use  Patient has not smoked or used tobacco in the past   Patient reports no alcohol use  Patient drives a vehicle  Patient wears seat belt  Current level of exercise of physical activity described by patient as: moderate  Activities of Daily Living:  Can get out of bed by his or her self, able to dress self, able to make own meals, able to do own shopping, able to bathe self, can do own laundry/housekeeping, can manage own money, pay bills and track expenses    Previous Hospitalizations:  No hospitalization or ED visit in past 12 months        Advanced Directives:  Patient has decided on a power of   Patient has spoken to designated power of   Patient has completed advanced directive          Preventative Screening/Counseling:      Cardiovascular:      General: Screening Current      Counseling: Healthy Diet, Healthy Weight and Improve Cholesterol     Due for Labs/Analytes/Optional EKG: Lipid Panel          Diabetes:      General: Screening Current      Counseling: Healthy Diet, Healthy Weight and Improve Physical Activity      Due for labs: Blood Glucose          Colorectal Cancer:      General: Patient Declines and Screening Not Indicated      Counseling: high fiber diet          Breast Cancer:      General: Patient Declines and Risks and Benefits Discussed          Cervical Cancer:      General: Patient Declines          Osteoporosis:      General: Patient Declines and Risks and Benefits Discussed      Counseling: Calcium and Vitamin D Intake and Regular Weightbearing Exercise          AAA:      General: Screening Not Indicated          Glaucoma:      General: Screening Current          HIV:      General: Screening Not Indicated          Hepatitis C:      General: Screening Not Indicated        Advanced Directives:   Patient has living will for healthcare, has durable POA for healthcare, patient has an advanced directive  5 wishes given  Provider agrees with end of life decisions   Immunizations:      Influenza: Risks & Benefits Discussed, Patient Declines and Influenza Recommended Annually      Pneumococcal: Lifetime Vaccine Completed      Other Preventative Counseling (Non-Medicare):   Fall Prevention, Increase physical activity and Car/seat belt/driving safety reviewed

## 2018-11-11 ENCOUNTER — APPOINTMENT (OUTPATIENT)
Dept: LAB | Facility: HOSPITAL | Age: 83
End: 2018-11-11
Attending: INTERNAL MEDICINE
Payer: MEDICARE

## 2018-11-11 DIAGNOSIS — E03.9 HYPOTHYROIDISM, UNSPECIFIED TYPE: ICD-10-CM

## 2018-11-11 DIAGNOSIS — I27.21 PULMONARY ARTERY HYPERTENSION (HCC): ICD-10-CM

## 2018-11-11 DIAGNOSIS — M06.9 RHEUMATOID ARTHRITIS INVOLVING MULTIPLE SITES, UNSPECIFIED RHEUMATOID FACTOR PRESENCE: ICD-10-CM

## 2018-11-11 DIAGNOSIS — C91.10 CHRONIC LYMPHOCYTIC LEUKEMIA (HCC): ICD-10-CM

## 2018-11-11 DIAGNOSIS — I10 BENIGN ESSENTIAL HYPERTENSION: ICD-10-CM

## 2018-11-11 LAB
ALBUMIN SERPL BCP-MCNC: 3.6 G/DL (ref 3.5–5)
ALP SERPL-CCNC: 58 U/L (ref 46–116)
ALT SERPL W P-5'-P-CCNC: 27 U/L (ref 12–78)
ANION GAP SERPL CALCULATED.3IONS-SCNC: 8 MMOL/L (ref 4–13)
AST SERPL W P-5'-P-CCNC: 32 U/L (ref 5–45)
BASOPHILS # BLD MANUAL: 0 THOUSAND/UL (ref 0–0.1)
BASOPHILS NFR MAR MANUAL: 0 % (ref 0–1)
BILIRUB SERPL-MCNC: 0.6 MG/DL (ref 0.2–1)
BUN SERPL-MCNC: 29 MG/DL (ref 5–25)
CALCIUM SERPL-MCNC: 9.9 MG/DL (ref 8.3–10.1)
CHLORIDE SERPL-SCNC: 97 MMOL/L (ref 100–108)
CHOLEST SERPL-MCNC: 250 MG/DL (ref 50–200)
CO2 SERPL-SCNC: 29 MMOL/L (ref 21–32)
CREAT SERPL-MCNC: 1.3 MG/DL (ref 0.6–1.3)
EOSINOPHIL # BLD MANUAL: 0 THOUSAND/UL (ref 0–0.4)
EOSINOPHIL NFR BLD MANUAL: 0 % (ref 0–6)
ERYTHROCYTE [DISTWIDTH] IN BLOOD BY AUTOMATED COUNT: 12.3 % (ref 11.6–15.1)
GFR SERPL CREATININE-BSD FRML MDRD: 37 ML/MIN/1.73SQ M
GLUCOSE P FAST SERPL-MCNC: 85 MG/DL (ref 65–99)
HCT VFR BLD AUTO: 36.8 % (ref 34.8–46.1)
HDLC SERPL-MCNC: 104 MG/DL (ref 40–60)
HGB BLD-MCNC: 12 G/DL (ref 11.5–15.4)
LDLC SERPL CALC-MCNC: 135 MG/DL (ref 0–100)
LYMPHOCYTES # BLD AUTO: 10.13 THOUSAND/UL (ref 0.6–4.47)
LYMPHOCYTES # BLD AUTO: 59 % (ref 14–44)
MCH RBC QN AUTO: 29.9 PG (ref 26.8–34.3)
MCHC RBC AUTO-ENTMCNC: 32.6 G/DL (ref 31.4–37.4)
MCV RBC AUTO: 92 FL (ref 82–98)
MONOCYTES # BLD AUTO: 0.86 THOUSAND/UL (ref 0–1.22)
MONOCYTES NFR BLD: 5 % (ref 4–12)
NEUTROPHILS # BLD MANUAL: 6.18 THOUSAND/UL (ref 1.85–7.62)
NEUTS SEG NFR BLD AUTO: 36 % (ref 43–75)
NONHDLC SERPL-MCNC: 146 MG/DL
NRBC BLD AUTO-RTO: 0 /100 WBCS
PLATELET # BLD AUTO: 220 THOUSANDS/UL (ref 149–390)
PLATELET BLD QL SMEAR: ADEQUATE
PMV BLD AUTO: 11.4 FL (ref 8.9–12.7)
POTASSIUM SERPL-SCNC: 4 MMOL/L (ref 3.5–5.3)
PROT SERPL-MCNC: 6.9 G/DL (ref 6.4–8.2)
RBC # BLD AUTO: 4.01 MILLION/UL (ref 3.81–5.12)
SMUDGE CELLS BLD QL SMEAR: PRESENT
SODIUM SERPL-SCNC: 134 MMOL/L (ref 136–145)
TOTAL CELLS COUNTED SPEC: 100
TRIGL SERPL-MCNC: 54 MG/DL
TSH SERPL DL<=0.05 MIU/L-ACNC: 0.29 UIU/ML (ref 0.36–3.74)
WBC # BLD AUTO: 17.17 THOUSAND/UL (ref 4.31–10.16)

## 2018-11-11 PROCEDURE — 80061 LIPID PANEL: CPT

## 2018-11-11 PROCEDURE — 84443 ASSAY THYROID STIM HORMONE: CPT

## 2018-11-11 PROCEDURE — 36415 COLL VENOUS BLD VENIPUNCTURE: CPT

## 2018-11-11 PROCEDURE — 85027 COMPLETE CBC AUTOMATED: CPT

## 2018-11-11 PROCEDURE — 85007 BL SMEAR W/DIFF WBC COUNT: CPT

## 2018-11-11 PROCEDURE — 80053 COMPREHEN METABOLIC PANEL: CPT

## 2018-11-11 NOTE — ASSESSMENT & PLAN NOTE
Rheumatoid arthritis symptoms have worsened somewhat especially into her legs and her back  She is requesting a referral to see Orthopedics for this  She has seen them in the past   Referral given  Tylenol Arthritis recommended  Avoid anti-inflammatories with her history of some transient renal insufficiency  Be careful to avoid falls  Discussed with her following up again with Rheumatology but this is difficult because of traveling any distances  Hopefully Rheumatology will be available closer to home in the near future

## 2018-11-11 NOTE — ASSESSMENT & PLAN NOTE
Has had some mild renal insufficiency in the past   She has not had recent laboratory testing done  Encouraged her to have this done in the near future so this can be followed  Try to stay well hydrated  Avoid nephrotoxins

## 2018-11-11 NOTE — ASSESSMENT & PLAN NOTE
For now will continue the same dose of the levothyroxine  She did not have her laboratory testing done prior to this visit  A new slip was given to her  Encouraged her to get the old lab slip done now and then likely have another repeat laboratory testing done prior to her next visit  Discussed with her that some of her fatigue symptoms may be related to her thyroid so encouraged her to get this done as soon as possible

## 2018-11-11 NOTE — ASSESSMENT & PLAN NOTE
Will refer her to the back specialist as noted  Continue to follow up with chiropractor which seems to help somewhat  Tylenol Arthritis if needed

## 2018-11-11 NOTE — ASSESSMENT & PLAN NOTE
Blood pressure controlled  Continue the triamterene/hydrochlorothiazide and the losartan  Stay well hydrated  Watch for orthostatics symptoms  Change positions slowly  Watch salt intake

## 2018-11-11 NOTE — ASSESSMENT & PLAN NOTE
Has had some worsening symptoms which are likely related to the kyphosis, scoliosis, rheumatoid arthritis coupled with the spinal stenosis  Will refer her to the back specialist that she had seen before    Discussed with her pain management but she wishes to see Orthopedics 1st

## 2018-11-11 NOTE — ASSESSMENT & PLAN NOTE
Breathing symptoms are likely multifactorial with her also having known pulmonary hypertension  Watch for worsening  Encouraged her to get flu shot but she declines

## 2018-11-13 ENCOUNTER — OFFICE VISIT (OUTPATIENT)
Dept: HEMATOLOGY ONCOLOGY | Facility: HOSPITAL | Age: 83
End: 2018-11-13
Payer: MEDICARE

## 2018-11-13 VITALS
WEIGHT: 118 LBS | RESPIRATION RATE: 18 BRPM | HEIGHT: 58 IN | SYSTOLIC BLOOD PRESSURE: 140 MMHG | BODY MASS INDEX: 24.77 KG/M2 | HEART RATE: 80 BPM | DIASTOLIC BLOOD PRESSURE: 70 MMHG | TEMPERATURE: 96.9 F

## 2018-11-13 DIAGNOSIS — C91.10 CLL (CHRONIC LYMPHOCYTIC LEUKEMIA) (HCC): Primary | ICD-10-CM

## 2018-11-13 DIAGNOSIS — I87.8 VENOUS STASIS: ICD-10-CM

## 2018-11-13 DIAGNOSIS — R60.0 LOCALIZED EDEMA: ICD-10-CM

## 2018-11-13 PROCEDURE — 99214 OFFICE O/P EST MOD 30 MIN: CPT | Performed by: INTERNAL MEDICINE

## 2018-11-13 NOTE — PROGRESS NOTES
[facility] [address] [address] [phone] [address] [place] [address] [id number],[birth year], [id number]  11/13/18    Discussion:   In summary, this is an 12-year-old female history of CL L  Recent white count is slightly elevated, 17  Hemoglobin and platelets are normal  Absolute lymphocyte count is 10,000  Physical exam shows no lymphadenopathy  She does have edema of the bilateral legs, slightly greater on the right than the left  The right lower leg is somewhat tender  Dopplers are requested to rule out the possibility of DVT  From an oncology viewpoint observation is continued  I discussed the above with the patient  The patient and her daughter voiced understanding and agreement   ______________________________________________________________________    Chief Complaint   Patient presents with    Follow-up     chronic        HPI:     C[facility]) ([facility] )    3/20/2013 Initial Diagnosis     CLL (chronic lymphocytic leukemia) ([facility] )  2013- diagnosed with CLL  Mild leukocytosis  13 q- (a favorable prognostic feature) noted  Interval History:  Bilateral leg swelling x1 week  0 - Asymptomatic    Review of Systems   Constitutional: Negative for appetite change, diaphoresis, fatigue and fever  HENT: Negative for sinus pain  Eyes: Negative for discharge  Respiratory: Negative for cough and shortness of breath  Cardiovascular: Positive for leg swelling  Negative for chest pain  Gastrointestinal: Negative for abdominal pain, constipation and diarrhea  Endocrine: Negative for cold intolerance  Genitourinary: Negative for difficulty urinating and hematuria  Musculoskeletal: Negative for joint swelling  Skin: Negative for rash  Allergic/Immunologic: Negative for environmental allergies  Neurological: Negative for dizziness and headaches  Hematological: Negative for adenopathy  Psychiatric/Behavioral: Negative for agitation         Past Medical History:   Diagnosis Date    Carpal tunnel syndrome     unspecified , lateraly ,   resolved 2014    Cubital tunnel syndrome on left     resolved 2014    Dyslipidemia     Ectopic heartbeats     resolved 2014    Hypertension     Malignant melanoma of [address] ([facility] [address] )     resolved 2014    Premature atrial contraction     Transient ischemic attack     resolved 2014     Patient Active Problem List   Diagnosis    Cervical osteoarthritis    Chronic kidney insufficiency    Chronic lymphocytic leukemia (HCC)    Chronic obstructive pulmonary disease (HCC)    CKD (chronic kidney disease) stage 3, GFR 30-59 ml/min (HCC)    CLL (chronic lymphocytic leukemia) (HCC)    Combined disorders of mitral, aortic and tricuspid valves    Dyslipidemia    Pulmonary emphysema (HCC)    Esophageal reflux    History of thoracotomy    History of blood transfusion    History of D&C    History of lumpectomy of right breast    History of pneumothorax    Hx of emergency  section     (spontaneous vaginal delivery)    Benign essential hypertension    Hypokalemia    Hyponatremia    Hypothyroidism    Hemoptysis    Lumbar stenosis    Pulmonary disease    RA (rheumatoid arthritis) (HCC)    Osteoarthritis    Premature atrial contraction    Pulmonary artery hypertension (HCC)    Scoliosis    Spinal stenosis    Trochanteric bursitis of both hips    Venous stasis    Kyphosis       Current Outpatient Prescriptions:     ALPRAZolam (XANAX) 0 25 mg tablet, Take 1 tablet (0 25 mg total) by mouth 2 (two) times a day as needed for anxiety, Disp: 90 tablet, Rfl: 0    ascorbic acid (VITAMIN C) 250 mg tablet, Take 1 tablet by mouth daily, Disp: , Rfl:     calcium-vitamin D (OSCAL-500) 500-400 MG-UNIT per tablet, Take by mouth, Disp: , Rfl:     CELEBREX 200 MG capsule, TAKE 1 CAPSULE TWICE A DAY, Disp: 180 capsule, Rfl: 0   Cholecalciferol (VITAMIN D3) 2000 units capsule, Take by mouth, Disp: , Rfl:     coenzyme Q-10 100 MG capsule, Take by mouth, Disp: , Rfl:     conjugated estrogens (PREMARIN) 1 25 mg tablet, Take 1 tablet (1 25 mg total) by mouth daily, Disp: 90 tablet, Rfl: 3    Cranberry 400 MG CAPS, Take by mouth, Disp: , Rfl:     levothyroxine 112 mcg tablet, TAKE 1 TABLET DAILY AS DIRECTED, Disp: 90 tablet, Rfl: 2    losartan (COZAAR) 50 mg tablet, TAKE ONE-HALF (1/2) TABLET DAILY, Disp: 45 tablet, Rfl: 2    montelukast (SINGULAIR) 10 mg tablet, Take 1 tablet by mouth daily, Disp: , Rfl:     moxifloxacin (VIGAMOX) 0 5 % ophthalmic solution, Apply to eye, Disp: , Rfl:     olopatadine HCl (PATADAY) 0 2 % opth drops, Apply to eye, Disp: , Rfl:     omeprazole (PRILOSEC) 20 mg delayed release capsule, Take 20 mg by mouth, Disp: , Rfl:     pyridoxine (VITAMIN B6) 100 mg tablet, Take 50 mg by mouth daily  , Disp: , Rfl:     triamterene-hydrochlorothiazide (MAXZIDE-25) 37 5-25 mg per tablet, Take 1 tablet by mouth daily, Disp: 90 tablet, Rfl: 0    Vitamin E 200 units TABS, Take 1 tablet by mouth daily, Disp: , Rfl:     Zinc Gluconate 15 MG TABS, Take 1 tablet by mouth daily, Disp: , Rfl:   Allergies   Allergen Reactions    Aspirin     Codeine Polt-Chlorphen Polt Er     Etanercept     Lorazepam     Morphine     Oxycodone     Penicillins     Povidone Iodine     Prednisone      Past Surgical History:   Procedure Laterality Date    ANTERIOR RELEASE VERTEBRAL BODY W/ POSTERIOR FUSION      APPENDECTOMY      BREAST LUMPECTOMY      CATARACT EXTRACTION       SECTION      HYSTERECTOMY      LUMBAR LAMINECTOMY      NEUROPLASTY      decompression medial nerve at carpal tunnel     OOPHORECTOMY      THORACOSCOPY W/ TALC PLEURODESIS      TONSILLECTOMY       Social History     Objective: There were no vitals filed for this visit  Physical Exam   Constitutional: She is oriented to person, place, and time   She appears well-developed  HENT:   Head: Normocephalic  Eyes: Pupils are equal, round, and reactive to light  Neck: Neck supple  Cardiovascular: Normal rate  No murmur heard  Pulmonary/Chest: No respiratory distress  She has no wheezes  She has no rales  Abdominal: Soft  She exhibits no distension  There is no tenderness  There is no rebound  Musculoskeletal: She exhibits edema  Lymphadenopathy:     She has no cervical adenopathy  Neurological: She is alert and oriented to person, place, and time  She displays normal reflexes  Skin: Skin is warm  No rash noted  Psychiatric: She has a normal mood and affect  Thought content normal          Labs: I personally reviewed the labs and imaging pertinent to this patient care

## 2018-11-21 DIAGNOSIS — J30.1 ALLERGIC RHINITIS DUE TO POLLEN, UNSPECIFIED SEASONALITY: Primary | ICD-10-CM

## 2018-11-21 RX ORDER — MONTELUKAST SODIUM 10 MG/1
10 TABLET ORAL DAILY
Qty: 90 TABLET | Refills: 2 | Status: SHIPPED | OUTPATIENT
Start: 2018-11-21 | End: 2019-08-20 | Stop reason: SDUPTHER

## 2018-11-21 NOTE — TELEPHONE ENCOUNTER
Received a call from [facility] requesting a refill on singulair 10mg qd  Verified sig and pharmacy  Aware of 48 hr fill policy  Please advise

## 2018-11-27 ENCOUNTER — HOSPITAL ENCOUNTER (OUTPATIENT)
Dept: ULTRASOUND IMAGING | Facility: HOSPITAL | Age: 83
Discharge: HOME | End: 2018-11-27
Attending: INTERNAL MEDICINE
Payer: MEDICARE

## 2018-11-27 DIAGNOSIS — I87.8 VENOUS STASIS: ICD-10-CM

## 2018-11-27 DIAGNOSIS — R60.0 LOCALIZED EDEMA: ICD-10-CM

## 2018-11-27 PROCEDURE — 93970 EXTREMITY STUDY: CPT

## 2018-11-27 PROCEDURE — 93970 EXTREMITY STUDY: CPT | Performed by: SURGERY

## 2018-12-15 DIAGNOSIS — M06.9 RHEUMATOID ARTHRITIS INVOLVING MULTIPLE SITES, UNSPECIFIED RHEUMATOID FACTOR PRESENCE: ICD-10-CM

## 2019-01-02 DIAGNOSIS — I10 ESSENTIAL HYPERTENSION: ICD-10-CM

## 2019-01-02 RX ORDER — TRIAMTERENE AND HYDROCHLOROTHIAZIDE 37.5; 25 MG/1; MG/1
TABLET ORAL
Qty: 90 TABLET | Refills: 1 | Status: SHIPPED | OUTPATIENT
Start: 2019-01-02 | End: 2019-07-01 | Stop reason: SDUPTHER

## 2019-01-09 ENCOUNTER — OFFICE VISIT (OUTPATIENT)
Dept: INTERNAL MEDICINE CLINIC | Facility: CLINIC | Age: 84
End: 2019-01-09
Payer: MEDICARE

## 2019-01-09 VITALS
OXYGEN SATURATION: 96 % | WEIGHT: 119 LBS | SYSTOLIC BLOOD PRESSURE: 130 MMHG | HEIGHT: 58 IN | TEMPERATURE: 99 F | BODY MASS INDEX: 24.98 KG/M2 | HEART RATE: 61 BPM | DIASTOLIC BLOOD PRESSURE: 60 MMHG

## 2019-01-09 DIAGNOSIS — J01.90 ACUTE NON-RECURRENT SINUSITIS, UNSPECIFIED LOCATION: Primary | ICD-10-CM

## 2019-01-09 PROCEDURE — 99213 OFFICE O/P EST LOW 20 MIN: CPT | Performed by: FAMILY MEDICINE

## 2019-01-09 RX ORDER — AZITHROMYCIN 250 MG/1
TABLET, FILM COATED ORAL
Qty: 6 TABLET | Refills: 1 | Status: SHIPPED | OUTPATIENT
Start: 2019-01-09 | End: 2019-01-14

## 2019-01-09 NOTE — PROGRESS NOTES
Assessment/Plan:    No problem-specific Assessment & Plan notes found for this encounter  Diagnoses and all orders for this visit:    Acute non-recurrent sinusitis, unspecified location  -     azithromycin (ZITHROMAX) 250 mg tablet; 2 pills today, then one daily for 4 more days        Fluids  Rest   The Z-Sami as noted above  Tylenol if needed  Mucinex to loosen mucus  Call or follow-up if symptoms worsen or persist     Subjective:      Patient ID: Ulisses De Luna is a 80 y o  female  She presents for acute visit  She started over the last 3-4 days with increasing nasal congestion and postnasal drip  Started with mild nasal congestion over the past few weeks but symptoms only worsened this week  He left ear has felt blocked  Throat has felt irritated from coughing  He the significant postnasal drip  Worse with laying down  Denies any fevers or chills  Denies any wheezing  Denies any significant shortness of breath  Denies chest pain or palpitations  No nausea, vomiting, or diarrhea  Has had persistent back pain and joint symptoms  Slightly worse with this illness  Multiple sick contacts  The following portions of the patient's history were reviewed and updated as appropriate:   She  has a past medical history of Carpal tunnel syndrome; Cubital tunnel syndrome on left; Dyslipidemia; Ectopic heartbeats; Hypertension; Malignant melanoma of skin ([address] ); Premature atrial contraction; and Transient ischemic attack    She   Patient Active Problem List    Diagnosis Date Noted    Acute sinusitis, unspecified 2019    Kyphosis 10/23/2018    Venous stasis 2018    CKD (chronic kidney disease) stage 3, GFR 30-59 ml/min ([facility]) 2018    CLL (chronic lymphocytic leukemia) ([address] ) 2018    History of thoracotomy 2018    History of D&C 2018    History of lumpectomy of right breast 2018    History of pneumothorax 2018    Hx of emergency  section 2018     (spontaneous vaginal delivery) 2018    Hemoptysis 2018    Pulmonary disease 2018    RA (rheumatoid arthritis) ([facility] ) 2018    Cervical osteoarthritis 2017    Hypokalemia 2017    Lumbar stenosis 2017    Hyponatremia 10/31/2016    Premature atrial contraction 2016    Scoliosis 2016    Trochanteric bursitis of both hips 2015    Dyslipidemia 2014    Combined disorders of mitral, aortic and tricuspid valves 2014    Chronic obstructive pulmonary disease [facility] ) 2013    Pulmonary artery hypertension ([facility] ) 2013    Pulmonary emphysema ([place] [address] ) 2013    Chronic kidney insufficiency 2012    Esophageal reflux 2012    Benign essential hypertension 2012    Hypothyroidism 2012    Osteoarthritis 2012    Spinal stenosis 2012    History of blood transfusion 1966     She  has a past surgical history that includes Appendectomy; Breast lumpectomy; Cataract extraction;  section; Anterior release vertebral body w/ posterior fusion; Hysterectomy; Lumbar laminectomy; NEUROPLASTY; Oophorectomy; Thoracoscopy w/ talc pleurodesis; and Tonsillectomy  Her family history includes Cancer in her family; Colon cancer in her mother; Diabetes in her maternal uncle and paternal aunt; Heart disease in her father; Hypertension in her mother; Rheum arthritis in her father; Stroke in her family  She  reports that she has never smoked  She has never used smokeless tobacco  She reports that she drinks alcohol  She reports that she does not use drugs    Current Outpatient Prescriptions   Medication Sig Dispense Refill    ALPRAZolam (XANAX) 0 25 mg tablet Take 1 tablet (0 25 mg total) by mouth 2 (two) times a day as needed for anxiety 90 tablet 0    ascorbic acid (VITAMIN C) 250 mg tablet Take 1 tablet by mouth daily      calcium-vitamin D (OSCAL-500) 500-400 MG-UNIT per tablet Take by mouth      CELEBREX 200 MG capsule TAKE 1 CAPSULE TWICE A  capsule 0    Cholecalciferol (VITAMIN D3) 2000 units capsule Take by mouth      coenzyme Q-10 100 MG capsule Take by mouth      conjugated estrogens (PREMARIN) 1 25 mg tablet Take 1 tablet (1 25 mg total) by mouth daily 90 tablet 3    Cranberry 400 MG CAPS Take by mouth      levothyroxine 112 mcg tablet TAKE 1 TABLET DAILY AS DIRECTED 90 tablet 2    losartan (COZAAR) 50 mg tablet TAKE ONE-HALF (1/2) TABLET DAILY 45 tablet 2    montelukast (SINGULAIR) 10 mg tablet Take 1 tablet (10 mg total) by mouth daily 90 tablet 2    moxifloxacin (VIGAMOX) 0 5 % ophthalmic solution Apply to eye      olopatadine HCl (PATADAY) 0 2 % opth drops Apply to eye      omeprazole (PRILOSEC) 20 mg delayed release capsule Take 20 mg by mouth      pyridoxine (VITAMIN B6) 100 mg tablet Take 50 mg by mouth daily        triamterene-hydrochlorothiazide (MAXZIDE-25) 37 5-25 mg per tablet TAKE 1 TABLET DAILY 90 tablet 1    Vitamin E 200 units TABS Take 1 tablet by mouth daily      Zinc Gluconate 15 MG TABS Take 1 tablet by mouth daily      azithromycin (ZITHROMAX) 250 mg tablet 2 pills today, then one daily for 4 more days 6 tablet 1     No current facility-administered medications for this visit        Current Outpatient Prescriptions on File Prior to Visit   Medication Sig    ALPRAZolam (XANAX) 0 25 mg tablet Take 1 tablet (0 25 mg total) by mouth 2 (two) times a day as needed for anxiety    ascorbic acid (VITAMIN C) 250 mg tablet Take 1 tablet by mouth daily    calcium-vitamin D (OSCAL-500) 500-400 MG-UNIT per tablet Take by mouth    CELEBREX 200 MG capsule TAKE 1 CAPSULE TWICE A DAY    Cholecalciferol (VITAMIN D3) 2000 units capsule Take by mouth    coenzyme Q-10 100 MG capsule Take by mouth    conjugated estrogens (PREMARIN) 1 25 mg tablet Take 1 tablet (1 25 mg total) by mouth daily    Cranberry 400 MG CAPS Take by mouth    levothyroxine 112 mcg tablet TAKE 1 TABLET DAILY AS DIRECTED    losartan (COZAAR) 50 mg tablet TAKE ONE-HALF (1/2) TABLET DAILY    montelukast (SINGULAIR) 10 mg tablet Take 1 tablet (10 mg total) by mouth daily    moxifloxacin (VIGAMOX) 0 5 % ophthalmic solution Apply to eye    olopatadine HCl (PATADAY) 0 2 % opth drops Apply to eye    omeprazole (PRILOSEC) 20 mg delayed release capsule Take 20 mg by mouth    pyridoxine (VITAMIN B6) 100 mg tablet Take 50 mg by mouth daily      triamterene-hydrochlorothiazide (MAXZIDE-25) 37 5-25 mg per tablet TAKE 1 TABLET DAILY    Vitamin E 200 units TABS Take 1 tablet by mouth daily    Zinc Gluconate 15 MG TABS Take 1 tablet by mouth daily     No current facility-administered medications on file prior to visit  She is allergic to aspirin; codeine polt-chlorphen polt er; etanercept; lorazepam; morphine; oxycodone; penicillins; povidone iodine; and prednisone       Review of Systems   Constitutional: Positive for fatigue  Negative for activity change, appetite change, chills and fever  HENT: Positive for congestion, postnasal drip, rhinorrhea, sinus pressure and sore throat  See HPI   Respiratory: Negative for cough  Cardiovascular: Negative for chest pain and palpitations  Gastrointestinal: Negative for diarrhea, nausea and vomiting  Musculoskeletal: Positive for arthralgias and back pain  Hematological: Negative for adenopathy  Objective:      /60 (BP Location: Right arm, Patient Position: Sitting, Cuff Size: Standard)   Pulse 61   Temp 99 °F (37 2 °C) (Temporal)   Ht 4' 9 5" (1 461 m)   Wt 54 kg (119 lb)   SpO2 96%   BMI 25 31 kg/m²          Physical Exam   Constitutional: She is cooperative  HENT:   Head: Normocephalic and atraumatic  Boggy nasal mucosa; purulent nasal discharge; posterior pharyngeal erythema   Neck: No JVD present  Carotid bruit is not present  Cardiovascular: Normal rate and regular rhythm     Occasional extrasystoles are present  Pulmonary/Chest: No respiratory distress  She has no decreased breath sounds  She has no wheezes  She has rhonchi  Scattered rhonchi; moving air well   Lymphadenopathy:     She has no cervical adenopathy  Right: No supraclavicular adenopathy present  Left: No supraclavicular adenopathy present  Neurological: She is alert  Vitals reviewed

## 2019-01-09 NOTE — PATIENT INSTRUCTIONS
Rhinosinusitis   AMBULATORY CARE:   Rhinosinusitis (RS)  is inflammation of your nose and sinuses  It commonly begins as a virus, often as a common cold  Viruses usually last 7 to 10 days and do not need treatment  When the virus does not get better on its own, you may have bacterial RS  This means that bacteria have begun to grow inside your sinuses  Acute RS lasts less than 4 weeks  Chronic RS lasts 12 weeks or more  Recurrent RS is when you have 4 or more episodes of RS in one year  Your signs and symptoms  may be worse when you lie on your back or try to sleep  You may have any of the following:  · Stuffy nose and reduced sense of smell     · Runny nose with thick yellow or green mucus     · Pressure or pain on your face or a headache     · Pain in your teeth or bad breath     · Ear pain or pressure     · Fever or cough     · Tiredness  Seek care immediately if:   · You have double vision or you cannot see  · You have a stiff neck, a fever, or a bad headache  · Your eyeball bulges out or you cannot move your eye  · Your eye and eyelid are red, swollen, and painful  · You cannot open your eye  · You are more sleepy than normal, or you notice changes in your ability to think, move, or talk  · You have swelling of your forehead or scalp  Contact your healthcare provider if:   · Your symptoms are worse or do not improve after 3 to 5 days of treatment  · You have questions or concerns about your condition or care  Treatment for rhinosinusitis  may include any of the following:  · Acetaminophen  decreases pain and fever  It is available without a doctor's order  Ask how much to take and how often to take it  Follow directions  Acetaminophen can cause liver damage if not taken correctly  · NSAIDs , such as ibuprofen, help decrease swelling, pain, and fever  This medicine is available with or without a doctor's order   NSAIDs can cause stomach bleeding or kidney problems in certain people  If you take blood thinner medicine, always ask your healthcare provider if NSAIDs are safe for you  Always read the medicine label and follow directions  · Nasal steroid sprays  decrease inflammation in your nose and sinuses  · Decongestants  reduce swelling and drain mucus in the nose and sinuses  They may help you breathe easier  · Antihistamines  dry mucus in the nose and relieve sneezing  · Antibiotics  treat a bacterial infection and may be needed if your symptoms do not improve or they get worse  · Take your medicine as directed  Contact your healthcare provider if you think your medicine is not helping or if you have side effects  Tell him or her if you are allergic to any medicine  Keep a list of the medicines, vitamins, and herbs you take  Include the amounts, and when and why you take them  Bring the list or the pill bottles to follow-up visits  Carry your medicine list with you in case of an emergency  Self-care:   · Rinse your sinuses  Use a sinus rinse device to rinse your nasal passages with a saline (salt water) solution  This will help thin the mucus in your nose and rinse away pollen and dirt  It will also help reduce swelling so you can breathe normally  Ask your healthcare provider how often to do this  · Breathe in steam   Heat a bowl of water until you see steam  Lean over the bowl and make a tent over your head with a large towel  Breathe deeply for about 20 minutes  Be careful not to get too close to the steam or burn yourself  Do this 3 times a day  You can also breathe deeply when you take a hot shower  · Sleep with your head elevated  Place an extra pillow under your head before you go to sleep to help your sinuses drain  · Drink liquids as directed  Ask your healthcare provider how much liquid to drink each day and which liquids are best for you  Liquids will thin the mucus in your nose and help it drain   Avoid drinks that contain alcohol or caffeine  · Do not smoke, and avoid secondhand smoke  Nicotine and other chemicals in cigarettes and cigars can make your symptoms worse  Ask your healthcare provider for information if you currently smoke and need help to quit  E-cigarettes or smokeless tobacco still contain nicotine  Talk to your healthcare provider before you use these products  Follow up with your healthcare provider as directed: Follow up if your symptoms are worse or not better after 3 to 5 days of treatment  Write down your questions so you remember to ask them during your visits  © 2017 [address] Information is for End User's use only and may not be sold, redistributed or otherwise used for commercial purposes  All illustrations and images included in CareNotes® are the copyrighted property of A D [employer] M [employer] Inc  or Esteb[employer] Pfeiffer  The above information is an  only  It is not intended as medical advice for individual conditions or treatments  Talk to your doctor, nurse or pharmacist before following any medical regimen to see if it is safe and effective for you

## 2019-01-15 DIAGNOSIS — I10 BENIGN ESSENTIAL HYPERTENSION: ICD-10-CM

## 2019-01-15 RX ORDER — LOSARTAN POTASSIUM 50 MG/1
TABLET ORAL
Qty: 45 TABLET | Refills: 2 | Status: SHIPPED | OUTPATIENT
Start: 2019-01-15 | End: 2019-10-12 | Stop reason: SDUPTHER

## 2019-02-21 NOTE — PROGRESS NOTES
Cardiology Follow Up    Dierdre Koyanagi Raftopoulos  1933  [id number]  [address] [id number]    1  Benign essential hypertension     2  Dyslipidemia     3  Combined disorders of mitral, aortic and tricuspid valves  Echo complete with contrast if indicated   4  Pulmonary artery hypertension ([address] )     5  Premature atrial contraction     6  Lung nodules  CT chest without contrast       Discussion/Summary:      Mrs Ordoñez is a pleasant 25-year-old female who presents to the office today for routine follow-up  Since her last visit her shortness of breath with exertion is somewhat better but persists  She also has been noting some lower extremity edema  She denies any other signs or symptoms of congestive heart failure  I did advise a trial of another diuretic i e  Torsemide  At this point she wishes to hold off  She can utilize compression stockings  I have asked her to keep her legs elevated when sitting  She remains intolerant of Imdur or which was prescribed for pulmonary hypertension which did seem to help her shortness of breath  Otherwise her blood pressure control appears adequate  I will reassess her valvular heart disease with an echocardiogram prior to her next visit  She is also requesting a repeat CT scan of her chest given her multiple pulmonary nodules which I have ordered  I will see her back in the office in six months for ongoing evaluation  Interval History:   Mrs Ordoñez is a pleasant 25-year-old female who presents to the office for routine follow-up       Since her last visit she has been physically been feeling the same  She does note shortness of breath with activities which is slightly better since her last visit  She does note that she has become more sedentary due to progressive issues with her arthritis   She denies exertional chest discomfort  She does report lower extremity edema which began a couple months ago  It is present in the morning but worse as the day goes on  She did attempt to utilize Lasix which she has at home with no improvement  She denies any other signs or symptoms of heart failure including paroxysmal nocturnal dyspnea, orthopnea, acute weight gain or increasing abdominal girth  She denies lightheadedness, syncope or presyncope  She denies palpitations or symptoms of claudication  Problem List     Cervical osteoarthritis    Chronic kidney insufficiency    Chronic lymphocytic leukemia (HCC)    Chronic obstructive pulmonary disease (HCC)    CKD (chronic kidney disease) stage 3, GFR 30-59 ml/min    CLL (chronic lymphocytic leukemia) (HCC)    Combined disorders of mitral, aortic and tricuspid valves    Overview Signed 3/20/2018  2:03 PM by Ninoska Gordon MD     Description: Mild aortic regurgitation, moderate to severe mitral regurgitation, severe tricuspid regurgitation           Dyslipidemia    Pulmonary emphysema (HCC)    Esophageal reflux    History of thoracotomy    History of blood transfusion    History of D&C    History of lumpectomy of right breast    History of pneumothorax    Hx of emergency  section     (spontaneous vaginal delivery)    Hypertension    Hypokalemia    Hyponatremia    Hypothyroidism    Hemoptysis    Lumbar stenosis    Pulmonary disease    RA (rheumatoid arthritis) (HCC)    Osteoarthritis    Premature atrial contraction    Pulmonary artery hypertension (HCC)    Scoliosis    Spinal stenosis    Trochanteric bursitis of both hips    Venous stasis        Past Medical History:   Diagnosis Date    Carpal tunnel syndrome     unspecified , lateraly ,   resolved 2014    Cubital tunnel syndrome on left     resolved 2014    Dyslipidemia     Ectopic heartbeats     resolved 2014    Hypertension     Malignant melanoma of skin ([address] )     resolved 2014    Premature atrial contraction     Transient ischemic attack     resolved 2014     Social History     Socioeconomic History    Marital status:       Spouse name: Not on file    Number of children: Not on file    Years of education: Not on file    Highest education level: Not on file   Occupational History    Not on file   Social Needs    Financial resource strain: Not on file    Food insecurity:     Worry: Not on file     Inability: Not on file    Transportation needs:     Medical: Not on file     Non-medical: Not on file   Tobacco Use    Smoking status: Never Smoker    Smokeless tobacco: Never Used   Substance and Sexual Activity    Alcohol use: Yes     Comment: social    Drug use: No    Sexual activity: Not on file   Lifestyle    Physical activity:     Days per week: Not on file     Minutes per session: Not on file    Stress: Not on file   Relationships    Social connections:     Talks on phone: Not on file     Gets together: Not on file     Attends Taoism service: Not on file     Active member of club or organization: Not on file     Attends meetings of clubs or organizations: Not on file     Relationship status: Not on file    Intimate partner violence:     Fear of current or ex partner: Not on file     Emotionally abused: Not on file     Physically abused: Not on file     Forced sexual activity: Not on file   Other Topics Concern    Not on file   Social History Narrative    Not on file      Family History   Problem Relation Age of Onset    Colon cancer Mother     Hypertension Mother     Heart disease Father     Rheum arthritis Father     Cancer Family     Stroke Family     Diabetes Maternal Uncle     Diabetes Paternal Aunt      Past Surgical History:   Procedure Laterality Date    ANTERIOR RELEASE VERTEBRAL BODY W/ POSTERIOR FUSION      APPENDECTOMY      BREAST LUMPECTOMY      CATARACT EXTRACTION       SECTION      HYSTERECTOMY      LUMBAR LAMINECTOMY      NEUROPLASTY      decompression medial nerve at carpal tunnel     OOPHORECTOMY      THORACOSCOPY W/ TALC PLEURODESIS      TONSILLECTOMY         Current Outpatient Medications:     ALPRAZolam (XANAX) 0 25 mg tablet, Take 1 tablet (0 25 mg total) by mouth 2 (two) times a day as needed for anxiety, Disp: 90 tablet, Rfl: 0    ascorbic acid (VITAMIN C) 250 mg tablet, Take 1 tablet by mouth daily, Disp: , Rfl:     calcium-vitamin D (OSCAL-500) 500-400 MG-UNIT per tablet, Take by mouth, Disp: , Rfl:     CELEBREX 200 MG capsule, TAKE 1 CAPSULE TWICE A DAY, Disp: 180 capsule, Rfl: 0    Cholecalciferol (VITAMIN D3) 2000 units capsule, Take by mouth, Disp: , Rfl:     coenzyme Q-10 100 MG capsule, Take by mouth, Disp: , Rfl:     conjugated estrogens (PREMARIN) 1 25 mg tablet, Take 1 tablet (1 25 mg total) by mouth daily, Disp: 90 tablet, Rfl: 3    Cranberry 400 MG CAPS, Take by mouth, Disp: , Rfl:     levothyroxine 112 mcg tablet, TAKE 1 TABLET DAILY AS DIRECTED, Disp: 90 tablet, Rfl: 2    losartan (COZAAR) 50 mg tablet, TAKE ONE-HALF (1/2) TABLET DAILY, Disp: 45 tablet, Rfl: 2    montelukast (SINGULAIR) 10 mg tablet, Take 1 tablet (10 mg total) by mouth daily, Disp: 90 tablet, Rfl: 2    moxifloxacin (VIGAMOX) 0 5 % ophthalmic solution, Apply to eye, Disp: , Rfl:     olopatadine HCl (PATADAY) 0 2 % opth drops, Apply to eye, Disp: , Rfl:     omeprazole (PRILOSEC) 20 mg delayed release capsule, Take 20 mg by mouth, Disp: , Rfl:     pyridoxine (VITAMIN B6) 100 mg tablet, Take 50 mg by mouth daily  , Disp: , Rfl:     triamterene-hydrochlorothiazide (MAXZIDE-25) 37 5-25 mg per tablet, TAKE 1 TABLET DAILY, Disp: 90 tablet, Rfl: 1    Vitamin E 200 units TABS, Take 1 tablet by mouth daily, Disp: , Rfl:     Zinc Gluconate 15 MG TABS, Take 1 tablet by mouth daily, Disp: , Rfl:     isosorbide mononitrate (ISMO,MONOKET) 20 mg tablet, 20 mg in AM and 20 mg in PM, Disp: , Rfl:   Allergies   Allergen Reactions    Aspirin     Codeine Polt-Chlorphen Polt Er     Etanercept     Lorazepam     Morphine     Oxycodone     Penicillins     Povidone Iodine     Prednisone        Labs:     Chemistry        Component Value Date/Time     (L) 07/21/2015 1427    K 4 0 11/11/2018 1214    K 4 1 07/21/2015 1427    CL 97 (L) 11/11/2018 1214    CL 95 (L) 07/21/2015 1427    CO2 29 11/11/2018 1214    CO2 28 5 07/21/2015 1427    BUN 29 (H) 11/11/2018 1214    BUN 25 07/21/2015 1427    CREATININE 1 30 11/11/2018 1214    CREATININE 1 11 07/21/2015 1427        Component Value Date/Time    CALCIUM 9 9 11/11/2018 1214    CALCIUM 8 9 07/21/2015 1427    ALKPHOS 58 11/11/2018 1214    ALKPHOS 51 07/21/2015 1427    AST 32 11/11/2018 1214    AST 25 07/21/2015 1427    ALT 27 11/11/2018 1214    ALT 22 07/21/2015 1427    BILITOT 0 39 07/21/2015 1427            Lab Results   Component Value Date    CHOL 235 07/21/2015    CHOL 237 04/06/2014    CHOL 239 10/29/2013     Lab Results   Component Value Date     (H) 11/11/2018    HDL 94 (H) 05/01/2018    HDL 94 (H) 12/04/2017     Lab Results   Component Value Date    LDLCALC 135 (H) 11/11/2018    LDLCALC 123 (H) 05/01/2018    LDLCALC 106 (H) 12/04/2017     Lab Results   Component Value Date    TRIG 54 11/11/2018    TRIG 158 (H) 05/01/2018    TRIG 186 (H) 12/04/2017     No results found for: CHOLHDL    Imaging: No results found  Review of Systems   Cardiovascular: Positive for dyspnea on exertion  Negative for chest pain, leg swelling, near-syncope, orthopnea, palpitations, paroxysmal nocturnal dyspnea and syncope  Musculoskeletal: Positive for arthritis and back pain  All other systems reviewed and are negative  Vitals:    02/22/19 1312   BP: 124/72   Pulse: 67     Vitals:    02/22/19 1312   Weight: 53 5 kg (118 lb)     Height: 4' 9" (144 8 cm)   Body mass index is 25 53 kg/m²      Physical Exam:  General:  Alert and cooperative, appears stated age  HEENT:  AMBER GOVEA, no scleral icterus, no conjunctival pallor  Neck:  No lymphadenopathy, no thyromegaly, no carotid bruits, no elevated JVP  Heart[de-identified]  Regular with ectopy, normal E1/D7, 2/6 holosystolic murmur LLSB  Lungs:  Clear to auscultation bilaterally   Abdomen:  Soft, non-tender, positive bowel sounds, no rebound or guarding,   no organomegaly   Extremities:  2+ edema    Vascular:  2+ pedal pulses  Skin:  No rashes or lesions on exposed skin  Neurologic:  Cranial nerves II-XII grossly intact without focal deficits

## 2019-02-22 ENCOUNTER — OFFICE VISIT (OUTPATIENT)
Dept: CARDIOLOGY CLINIC | Facility: HOSPITAL | Age: 84
End: 2019-02-22
Payer: MEDICARE

## 2019-02-22 VITALS
SYSTOLIC BLOOD PRESSURE: 124 MMHG | HEIGHT: 57 IN | BODY MASS INDEX: 25.46 KG/M2 | WEIGHT: 118 LBS | HEART RATE: 67 BPM | DIASTOLIC BLOOD PRESSURE: 72 MMHG

## 2019-02-22 DIAGNOSIS — I08.3 COMBINED DISORDERS OF MITRAL, AORTIC AND TRICUSPID VALVES: ICD-10-CM

## 2019-02-22 DIAGNOSIS — I27.21 PULMONARY ARTERY HYPERTENSION (HCC): ICD-10-CM

## 2019-02-22 DIAGNOSIS — I49.1 PREMATURE ATRIAL CONTRACTION: ICD-10-CM

## 2019-02-22 DIAGNOSIS — R91.8 LUNG NODULES: ICD-10-CM

## 2019-02-22 DIAGNOSIS — I10 BENIGN ESSENTIAL HYPERTENSION: Primary | ICD-10-CM

## 2019-02-22 DIAGNOSIS — E78.5 DYSLIPIDEMIA: ICD-10-CM

## 2019-02-22 PROCEDURE — 99214 OFFICE O/P EST MOD 30 MIN: CPT | Performed by: INTERNAL MEDICINE

## 2019-02-22 RX ORDER — ISOSORBIDE MONONITRATE 20 MG/1
TABLET ORAL
COMMUNITY
Start: 2017-07-18 | End: 2019-06-21 | Stop reason: CLARIF

## 2019-02-26 ENCOUNTER — OFFICE VISIT (OUTPATIENT)
Dept: INTERNAL MEDICINE CLINIC | Facility: CLINIC | Age: 84
End: 2019-02-26
Payer: MEDICARE

## 2019-02-26 ENCOUNTER — APPOINTMENT (OUTPATIENT)
Dept: LAB | Facility: HOSPITAL | Age: 84
End: 2019-02-26
Payer: MEDICARE

## 2019-02-26 DIAGNOSIS — M06.9 RHEUMATOID ARTHRITIS INVOLVING MULTIPLE SITES, UNSPECIFIED RHEUMATOID FACTOR PRESENCE: ICD-10-CM

## 2019-02-26 DIAGNOSIS — I10 BENIGN ESSENTIAL HYPERTENSION: ICD-10-CM

## 2019-02-26 DIAGNOSIS — I48.0 PAROXYSMAL ATRIAL FIBRILLATION (HCC): ICD-10-CM

## 2019-02-26 DIAGNOSIS — R60.9 PERIPHERAL EDEMA: ICD-10-CM

## 2019-02-26 DIAGNOSIS — N18.30 CKD (CHRONIC KIDNEY DISEASE) STAGE 3, GFR 30-59 ML/MIN (HCC): ICD-10-CM

## 2019-02-26 DIAGNOSIS — J44.9 CHRONIC OBSTRUCTIVE PULMONARY DISEASE, UNSPECIFIED COPD TYPE (HCC): ICD-10-CM

## 2019-02-26 DIAGNOSIS — I27.21 PULMONARY ARTERY HYPERTENSION (HCC): ICD-10-CM

## 2019-02-26 DIAGNOSIS — E03.9 HYPOTHYROIDISM, UNSPECIFIED TYPE: Primary | ICD-10-CM

## 2019-02-26 DIAGNOSIS — R39.15 URINARY URGENCY: ICD-10-CM

## 2019-02-26 DIAGNOSIS — E03.9 HYPOTHYROIDISM, UNSPECIFIED TYPE: ICD-10-CM

## 2019-02-26 PROBLEM — I48.91 ATRIAL FIBRILLATION (HCC): Status: ACTIVE | Noted: 2019-02-26

## 2019-02-26 PROBLEM — R60.0 PERIPHERAL EDEMA: Status: ACTIVE | Noted: 2019-02-26

## 2019-02-26 LAB
25(OH)D3 SERPL-MCNC: 46.5 NG/ML (ref 30–100)
ALBUMIN SERPL BCP-MCNC: 4 G/DL (ref 3.5–5)
ALP SERPL-CCNC: 67 U/L (ref 46–116)
ALT SERPL W P-5'-P-CCNC: 31 U/L (ref 12–78)
ANION GAP SERPL CALCULATED.3IONS-SCNC: 9 MMOL/L (ref 4–13)
AST SERPL W P-5'-P-CCNC: 37 U/L (ref 5–45)
BASOPHILS # BLD MANUAL: 0.49 THOUSAND/UL (ref 0–0.1)
BASOPHILS NFR MAR MANUAL: 3 % (ref 0–1)
BILIRUB SERPL-MCNC: 0.5 MG/DL (ref 0.2–1)
BILIRUB UR QL STRIP: NEGATIVE
BUN SERPL-MCNC: 28 MG/DL (ref 5–25)
CALCIUM SERPL-MCNC: 9.7 MG/DL (ref 8.3–10.1)
CHLORIDE SERPL-SCNC: 97 MMOL/L (ref 100–108)
CLARITY UR: NORMAL
CO2 SERPL-SCNC: 31 MMOL/L (ref 21–32)
COLOR UR: YELLOW
CREAT SERPL-MCNC: 1.3 MG/DL (ref 0.6–1.3)
EOSINOPHIL # BLD MANUAL: 0 THOUSAND/UL (ref 0–0.4)
EOSINOPHIL NFR BLD MANUAL: 0 % (ref 0–6)
ERYTHROCYTE [DISTWIDTH] IN BLOOD BY AUTOMATED COUNT: 12.7 % (ref 11.6–15.1)
GFR SERPL CREATININE-BSD FRML MDRD: 37 ML/MIN/1.73SQ M
GLUCOSE P FAST SERPL-MCNC: 80 MG/DL (ref 65–99)
GLUCOSE UR STRIP-MCNC: NEGATIVE MG/DL
HCT VFR BLD AUTO: 39.2 % (ref 34.8–46.1)
HGB BLD-MCNC: 12.8 G/DL (ref 11.5–15.4)
HGB UR QL STRIP.AUTO: NEGATIVE
KETONES UR STRIP-MCNC: NEGATIVE MG/DL
LEUKOCYTE ESTERASE UR QL STRIP: NEGATIVE
LYMPHOCYTES # BLD AUTO: 16 % (ref 14–44)
LYMPHOCYTES # BLD AUTO: 2.63 THOUSAND/UL (ref 0.6–4.47)
MCH RBC QN AUTO: 30 PG (ref 26.8–34.3)
MCHC RBC AUTO-ENTMCNC: 32.7 G/DL (ref 31.4–37.4)
MCV RBC AUTO: 92 FL (ref 82–98)
MONOCYTES # BLD AUTO: 0.99 THOUSAND/UL (ref 0–1.22)
MONOCYTES NFR BLD: 6 % (ref 4–12)
NEUTROPHILS # BLD MANUAL: 12.35 THOUSAND/UL (ref 1.85–7.62)
NEUTS SEG NFR BLD AUTO: 75 % (ref 43–75)
NITRITE UR QL STRIP: NEGATIVE
NRBC BLD AUTO-RTO: 0 /100 WBCS
NT-PROBNP SERPL-MCNC: 1167 PG/ML
PH UR STRIP.AUTO: 6 [PH] (ref 4.5–8)
PLATELET # BLD AUTO: 232 THOUSANDS/UL (ref 149–390)
PLATELET BLD QL SMEAR: ADEQUATE
PMV BLD AUTO: 11.5 FL (ref 8.9–12.7)
POTASSIUM SERPL-SCNC: 3.2 MMOL/L (ref 3.5–5.3)
PROT SERPL-MCNC: 7.5 G/DL (ref 6.4–8.2)
PROT UR STRIP-MCNC: NEGATIVE MG/DL
RBC # BLD AUTO: 4.26 MILLION/UL (ref 3.81–5.12)
RBC MORPH BLD: NORMAL
SMUDGE CELLS BLD QL SMEAR: PRESENT
SODIUM SERPL-SCNC: 137 MMOL/L (ref 136–145)
SP GR UR STRIP.AUTO: <=1.005 (ref 1–1.03)
TOTAL CELLS COUNTED SPEC: 100
TSH SERPL DL<=0.05 MIU/L-ACNC: 0.34 UIU/ML (ref 0.36–3.74)
UROBILINOGEN UR QL STRIP.AUTO: 0.2 E.U./DL
WBC # BLD AUTO: 16.46 THOUSAND/UL (ref 4.31–10.16)

## 2019-02-26 PROCEDURE — 84443 ASSAY THYROID STIM HORMONE: CPT

## 2019-02-26 PROCEDURE — 36415 COLL VENOUS BLD VENIPUNCTURE: CPT

## 2019-02-26 PROCEDURE — 83880 ASSAY OF NATRIURETIC PEPTIDE: CPT

## 2019-02-26 PROCEDURE — 93000 ELECTROCARDIOGRAM COMPLETE: CPT | Performed by: FAMILY MEDICINE

## 2019-02-26 PROCEDURE — 99214 OFFICE O/P EST MOD 30 MIN: CPT | Performed by: FAMILY MEDICINE

## 2019-02-26 PROCEDURE — 80053 COMPREHEN METABOLIC PANEL: CPT

## 2019-02-26 PROCEDURE — 85027 COMPLETE CBC AUTOMATED: CPT

## 2019-02-26 PROCEDURE — 82306 VITAMIN D 25 HYDROXY: CPT

## 2019-02-26 PROCEDURE — 87086 URINE CULTURE/COLONY COUNT: CPT

## 2019-02-26 PROCEDURE — 81003 URINALYSIS AUTO W/O SCOPE: CPT | Performed by: FAMILY MEDICINE

## 2019-02-26 PROCEDURE — 85007 BL SMEAR W/DIFF WBC COUNT: CPT

## 2019-02-27 ENCOUNTER — TELEPHONE (OUTPATIENT)
Dept: INTERNAL MEDICINE CLINIC | Facility: CLINIC | Age: 84
End: 2019-02-27

## 2019-02-27 DIAGNOSIS — E87.6 HYPOKALEMIA: Primary | ICD-10-CM

## 2019-02-27 LAB — BACTERIA UR CULT: NORMAL

## 2019-02-27 NOTE — PROGRESS NOTES
Assessment/Plan:    Hypothyroidism  TSH has been borderline low in the past but she has been asymptomatic  Currently she is having some symptoms that may be consistent with hyperthyroidism  She will get her laboratory testing done today  Advised her to hold the dose of her levothyroxine tomorrow until she hears from us  Chronic obstructive pulmonary disease (HCC)  Breathing has been relatively stable  Has been slightly more short of breath with the changes in weather  Continue to watch for worsening  Continue with the Singulair  Pulmonary artery hypertension ([address] )  She has been having increasing peripheral edema since her last visit  Discussed with her possible causes for this  Cardiac issues are a possibility  Especially since she now has atrial fibrillation on exam   This was confirmed on EKG  She also has the pulmonary hypertension that may be contributing to this  Continue follow-up with Pulmonary and Cardiology  Atrial fibrillation ([facility] )  On exam today she was irregular  She does typically have some abnormalities in her rhythm but this was different  EKG showed likely areas of atrial fibrillation  Will send a copy of this to her cardiologist and will send Dr Smami Edwards a message regarding her symptoms and the findings today  Discussed with her that thyroid may be a cause for this  Will adjust the dose if needed  Will get a Holter monitor for further investigation  Watch for any increasing palpitations, chest pain, dizziness, increasing shortness of breath, or increasing peripheral edema  Discussed with her anticoagulation if this is persistent or even sometimes if it is paroxysmal   She adamantly declines this  Hopefully she will reconsider in the future  Risks of not anticoagulating including stroke were discussed  Benign essential hypertension  Blood pressure more elevated today  This is relatively unusual for her  She feels this is related to her rushing around this morning  Will continue follow this  Will have her follow up with Cardiology especially with the atrial fibrillation  RA (rheumatoid arthritis) (HCC)  Arthritis symptoms have worsened over the winter months  Try to be as active as possible  Follow-up with Rheumatology  Peripheral edema  She has relatively significant peripheral edema to the mid shin bilaterally  This is relatively new for her  This could be related to the atrial fibrillation  Will check a BNP  Diagnoses and all orders for this visit:    Hypothyroidism, unspecified type  -     CBC and differential; Future  -     TSH, 3rd generation; Future  -     Vitamin D 25 hydroxy; Future    Pulmonary artery hypertension (HCC)  -     CBC and differential; Future  -     Vitamin D 25 hydroxy; Future  -     POCT ECG    Chronic obstructive pulmonary disease, unspecified COPD type (HCC)  -     CBC and differential; Future  -     Vitamin D 25 hydroxy; Future    Benign essential hypertension  -     CBC and differential; Future  -     Comprehensive metabolic panel; Future  -     Vitamin D 25 hydroxy; Future  -     POCT ECG    Rheumatoid arthritis involving multiple sites, unspecified rheumatoid factor presence (HCC)  -     CBC and differential; Future  -     Vitamin D 25 hydroxy; Future    CKD (chronic kidney disease) stage 3, GFR 30-59 ml/min (HCC)  -     CBC and differential; Future  -     Vitamin D 25 hydroxy; Future    Peripheral edema  -     CBC and differential; Future  -     NT-BNP PRO; Future  -     Vitamin D 25 hydroxy; Future  -     POCT ECG    Paroxysmal atrial fibrillation (HCC)  -     CBC and differential; Future  -     NT-BNP PRO; Future  -     Vitamin D 25 hydroxy; Future  -     POCT ECG  -     Holter monitor - 48 hour; Future    Urinary urgency  -     CBC and differential; Future  -     Vitamin D 25 hydroxy; Future  -     UA (URINE) with reflex to Microscopic  -     Urine culture;  Future          Subjective:      Patient ID: Nevilleramy Ordoñez is a 80 y o  female  She presents for routine follow-up but does have some complaints  She has had new onset peripheral edema that has been relatively significant to the mid shin  She has noticed that this has been relatively persistent over the last few months except for when she was on the Z-Sami recently  Strangely this resolved when she was on this antibiotic but then returned within a few days of being off of it  Is chronically short of breath but may be somewhat more short of breath with exertion recently  Denies any chest pain but has been having palpitations  Palpitations have been much worse today  Feels worse than her usual palpitations which have been chronic  Her arthritis symptoms have been worse  Has difficulty into her hands as well as into her back especially  Is concerned because she seems to be more kyphotic recently  Has difficulty sitting up straight  Her ribs on the right now rubbing against her iliac area and causing her significant pain  She has not yet followed up with the back specialist   She does not wish to pursue any surgical interventions but is interested in any bracing if it is possible  Continues to have issues with her eye especially the left 1  Continues with injections  Has been very active this morning and feels this is contributing to her blood pressure being elevated  She did take her losartan  Denies any headaches or localized weakness  Appetite has been very good  Denies any nausea, vomiting, or diarrhea  Denies any changes in bowel movements  Usually sleeps relatively well  Denies any shortness of breath with laying down  Rheumatoid arthritis symptoms have been relatively stable but have worsened with the winter months        The following portions of the patient's history were reviewed and updated as appropriate:   She  has a past medical history of Carpal tunnel syndrome, Cubital tunnel syndrome on left, Dyslipidemia, Ectopic heartbeats, Hypertension, Malignant melanoma of skin ([facility] ), Premature atrial contraction, and Transient ischemic attack  She   Patient Active Problem List    Diagnosis Date Noted    Peripheral edema 2019    Atrial fibrillation ([facility] ) 2019    Lung nodules 2019    Acute sinusitis, unspecified 2019    Kyphosis 10/23/2018    Venous stasis 2018    CKD (chronic kidney disease) stage 3, GFR 30-59 ml/min ([facility]) 2018    CLL (chronic lymphocytic leukemia) ([facility] ) 2018    History of thoracotomy 2018    History of D&C 2018    History of lumpectomy of right breast 2018    History of pneumothorax 2018    Hx of emergency  section 2018     (spontaneous vaginal delivery) 2018    Hemoptysis 2018    Pulmonary disease 2018    RA (rheumatoid arthritis) ([place] [address] ) 2018    Cervical osteoarthritis 2017    Hypokalemia 2017    Lumbar stenosis 2017    Hyponatremia 10/31/2016    Premature atrial contraction 2016    Scoliosis 2016    Trochanteric bursitis of both hips 2015    Dyslipidemia 2014    Combined disorders of mitral, aortic and tricuspid valves 2014    Chronic obstructive pulmonary disease ([facility] ) 2013    Pulmonary artery hypertension ([facility] ) 2013    Pulmonary emphysema ([place] [address] ) 2013    Chronic kidney insufficiency 2012    Esophageal reflux 2012    Benign essential hypertension 2012    Hypothyroidism 2012    Osteoarthritis 2012    Spinal stenosis 2012    History of blood transfusion 1966     She  has a past surgical history that includes Appendectomy; Breast lumpectomy; Cataract extraction;  section; Anterior release vertebral body w/ posterior fusion; Hysterectomy; Lumbar laminectomy; NEUROPLASTY; Oophorectomy; Thoracoscopy w/ talc pleurodesis; and Tonsillectomy    Her family history includes Cancer in her family; Colon cancer in her mother; Diabetes in her maternal uncle and paternal aunt; Heart disease in her father; Hypertension in her mother; Rheum arthritis in her father; Stroke in her family  She  reports that she has never smoked  She has never used smokeless tobacco  She reports that she drinks alcohol  She reports that she does not use drugs  Current Outpatient Medications   Medication Sig Dispense Refill    ALPRAZolam (XANAX) 0 25 mg tablet Take 1 tablet (0 25 mg total) by mouth 2 (two) times a day as needed for anxiety 90 tablet 0    ascorbic acid (VITAMIN C) 250 mg tablet Take 1 tablet by mouth daily      calcium-vitamin D (OSCAL-500) 500-400 MG-UNIT per tablet Take by mouth      CELEBREX 200 MG capsule TAKE 1 CAPSULE TWICE A  capsule 0    Cholecalciferol (VITAMIN D3) 2000 units capsule Take by mouth      coenzyme Q-10 100 MG capsule Take by mouth      conjugated estrogens (PREMARIN) 1 25 mg tablet Take 1 tablet (1 25 mg total) by mouth daily 90 tablet 3    Cranberry 400 MG CAPS Take by mouth      isosorbide mononitrate (ISMO,MONOKET) 20 mg tablet 20 mg in AM and 20 mg in PM      levothyroxine 112 mcg tablet TAKE 1 TABLET DAILY AS DIRECTED 90 tablet 2    losartan (COZAAR) 50 mg tablet TAKE ONE-HALF (1/2) TABLET DAILY 45 tablet 2    montelukast (SINGULAIR) 10 mg tablet Take 1 tablet (10 mg total) by mouth daily 90 tablet 2    moxifloxacin (VIGAMOX) 0 5 % ophthalmic solution Apply to eye      olopatadine HCl (PATADAY) 0 2 % opth drops Apply to eye      omeprazole (PRILOSEC) 20 mg delayed release capsule Take 20 mg by mouth      pyridoxine (VITAMIN B6) 100 mg tablet Take 50 mg by mouth daily        triamterene-hydrochlorothiazide (MAXZIDE-25) 37 5-25 mg per tablet TAKE 1 TABLET DAILY 90 tablet 1    Vitamin E 200 units TABS Take 1 tablet by mouth daily      Zinc Gluconate 15 MG TABS Take 1 tablet by mouth daily       No current facility-administered medications for this visit  Current Outpatient Medications on File Prior to Visit   Medication Sig    ALPRAZolam (XANAX) 0 25 mg tablet Take 1 tablet (0 25 mg total) by mouth 2 (two) times a day as needed for anxiety    ascorbic acid (VITAMIN C) 250 mg tablet Take 1 tablet by mouth daily    calcium-vitamin D (OSCAL-500) 500-400 MG-UNIT per tablet Take by mouth    CELEBREX 200 MG capsule TAKE 1 CAPSULE TWICE A DAY    Cholecalciferol (VITAMIN D3) 2000 units capsule Take by mouth    coenzyme Q-10 100 MG capsule Take by mouth    conjugated estrogens (PREMARIN) 1 25 mg tablet Take 1 tablet (1 25 mg total) by mouth daily    Cranberry 400 MG CAPS Take by mouth    isosorbide mononitrate (ISMO,MONOKET) 20 mg tablet 20 mg in AM and 20 mg in PM    levothyroxine 112 mcg tablet TAKE 1 TABLET DAILY AS DIRECTED    losartan (COZAAR) 50 mg tablet TAKE ONE-HALF (1/2) TABLET DAILY    montelukast (SINGULAIR) 10 mg tablet Take 1 tablet (10 mg total) by mouth daily    moxifloxacin (VIGAMOX) 0 5 % ophthalmic solution Apply to eye    olopatadine HCl (PATADAY) 0 2 % opth drops Apply to eye    omeprazole (PRILOSEC) 20 mg delayed release capsule Take 20 mg by mouth    pyridoxine (VITAMIN B6) 100 mg tablet Take 50 mg by mouth daily      triamterene-hydrochlorothiazide (MAXZIDE-25) 37 5-25 mg per tablet TAKE 1 TABLET DAILY    Vitamin E 200 units TABS Take 1 tablet by mouth daily    Zinc Gluconate 15 MG TABS Take 1 tablet by mouth daily     No current facility-administered medications on file prior to visit  She is allergic to aspirin; codeine polt-chlorphen polt er; etanercept; lorazepam; morphine; oxycodone; penicillins; povidone; povidone iodine; and prednisone       Review of Systems   Constitutional: Positive for fatigue  Negative for activity change, appetite change, chills and fever  HENT: Negative for congestion and rhinorrhea  Eyes: Positive for visual disturbance  Respiratory: Positive for shortness of breath   Negative for chest tightness  Cardiovascular: Positive for palpitations and leg swelling  Negative for chest pain  Gastrointestinal: Negative for abdominal pain, blood in stool, diarrhea, nausea and vomiting  Endocrine: Negative for polydipsia, polyphagia and polyuria  Genitourinary: Negative for dysuria, frequency and urgency  Musculoskeletal: Positive for arthralgias, back pain, gait problem, neck pain and neck stiffness  Skin: Negative for color change  Neurological: Negative for dizziness, weakness and headaches  Hematological: Does not bruise/bleed easily  Psychiatric/Behavioral: Negative for confusion and sleep disturbance  The patient is not nervous/anxious  Objective: There were no vitals taken for this visit  Physical Exam   Constitutional: She is oriented to person, place, and time  She is cooperative  No distress  HENT:   Head: Normocephalic and atraumatic  Mouth/Throat: Oropharynx is clear and moist    Slight cerumen   Eyes: Pupils are equal, round, and reactive to light  Conjunctivae are normal  Right eye exhibits no discharge  Left eye exhibits no discharge  Post surgical changes   Neck: No JVD present  Cardiovascular: Normal rate and normal heart sounds  An irregular rhythm present  Exam reveals no gallop and no friction rub  No murmur heard  2+ peripheral edema to the mid shin bilaterally   Pulmonary/Chest: No respiratory distress  She has decreased breath sounds  She has no wheezes  She has no rhonchi  She has no rales  Abdominal: Bowel sounds are normal  She exhibits no distension  There is no tenderness  Musculoskeletal: She exhibits no edema  Significant kyphosis   Lymphadenopathy:     She has no cervical adenopathy  Neurological: She is alert and oriented to person, place, and time  Skin: Skin is warm and dry  Psychiatric: She has a normal mood and affect   Her behavior is normal  Judgment normal  Cognition and memory are normal    Nursing note and vitals reviewed

## 2019-02-27 NOTE — ASSESSMENT & PLAN NOTE
Breathing has been relatively stable  Has been slightly more short of breath with the changes in weather  Continue to watch for worsening  Continue with the Singulair

## 2019-02-27 NOTE — TELEPHONE ENCOUNTER
I called and spoke with the patient and gave her the appointment for her holter monitor  I had informed her of the lab results  WBC is higher than before but stable due to CLL  Potassium is lower than it should be  Need to increase potassium in diet by eating potatoes with skin on, bananas, and tomatoes  Need to recheck BMP in 1 week  Thyroid dose needs to be increased to 100mcg  Holding today and start tomorrow  Palpitations aren't like they were when she was in the office yesterday  BNP slightly elevated but borderline  Dr Braswell Fears had sent message to cardiologist and she wont prescribe anything until she hears back from the doctor  Patient was feeling very shakey when she had gotten home  She had taken half a xanax and felt better

## 2019-02-27 NOTE — ASSESSMENT & PLAN NOTE
Blood pressure more elevated today  This is relatively unusual for her  She feels this is related to her rushing around this morning  Will continue follow this  Will have her follow up with Cardiology especially with the atrial fibrillation

## 2019-02-27 NOTE — ASSESSMENT & PLAN NOTE
She has relatively significant peripheral edema to the mid shin bilaterally  This is relatively new for her  This could be related to the atrial fibrillation  Will check a BNP

## 2019-02-27 NOTE — ASSESSMENT & PLAN NOTE
On exam today she was irregular  She does typically have some abnormalities in her rhythm but this was different  EKG showed likely areas of atrial fibrillation  Will send a copy of this to her cardiologist and will send Dr Nayeli Duckworth a message regarding her symptoms and the findings today  Discussed with her that thyroid may be a cause for this  Will adjust the dose if needed  Will get a Holter monitor for further investigation  Watch for any increasing palpitations, chest pain, dizziness, increasing shortness of breath, or increasing peripheral edema  Discussed with her anticoagulation if this is persistent or even sometimes if it is paroxysmal   She adamantly declines this  Hopefully she will reconsider in the future  Risks of not anticoagulating including stroke were discussed

## 2019-02-27 NOTE — ASSESSMENT & PLAN NOTE
TSH has been borderline low in the past but she has been asymptomatic  Currently she is having some symptoms that may be consistent with hyperthyroidism  She will get her laboratory testing done today  Advised her to hold the dose of her levothyroxine tomorrow until she hears from us

## 2019-02-27 NOTE — ASSESSMENT & PLAN NOTE
Arthritis symptoms have worsened over the winter months  Try to be as active as possible  Follow-up with Rheumatology

## 2019-02-27 NOTE — ASSESSMENT & PLAN NOTE
She has been having increasing peripheral edema since her last visit  Discussed with her possible causes for this  Cardiac issues are a possibility  Especially since she now has atrial fibrillation on exam   This was confirmed on EKG  She also has the pulmonary hypertension that may be contributing to this  Continue follow-up with Pulmonary and Cardiology

## 2019-02-28 DIAGNOSIS — E03.9 HYPOTHYROIDISM, UNSPECIFIED TYPE: ICD-10-CM

## 2019-02-28 RX ORDER — LEVOTHYROXINE SODIUM 112 UG/1
TABLET ORAL
Qty: 90 TABLET | Refills: 2 | OUTPATIENT
Start: 2019-02-28

## 2019-02-28 RX ORDER — LEVOTHYROXINE SODIUM 0.1 MG/1
100 TABLET ORAL DAILY
Qty: 90 TABLET | Refills: 3 | Status: SHIPPED | OUTPATIENT
Start: 2019-02-28 | End: 2020-02-08

## 2019-03-11 ENCOUNTER — HOSPITAL ENCOUNTER (OUTPATIENT)
Dept: NON INVASIVE DIAGNOSTICS | Facility: HOSPITAL | Age: 84
Discharge: HOME | End: 2019-03-11
Payer: MEDICARE

## 2019-03-11 DIAGNOSIS — I48.0 PAROXYSMAL ATRIAL FIBRILLATION (HCC): ICD-10-CM

## 2019-03-11 PROCEDURE — 93226 XTRNL ECG REC<48 HR SCAN A/R: CPT

## 2019-03-11 PROCEDURE — 93225 XTRNL ECG REC<48 HRS REC: CPT

## 2019-03-14 PROCEDURE — 93227 XTRNL ECG REC<48 HR R&I: CPT | Performed by: INTERNAL MEDICINE

## 2019-03-16 DIAGNOSIS — M06.9 RHEUMATOID ARTHRITIS INVOLVING MULTIPLE SITES, UNSPECIFIED RHEUMATOID FACTOR PRESENCE: ICD-10-CM

## 2019-03-21 ENCOUNTER — TELEPHONE (OUTPATIENT)
Dept: INTERNAL MEDICINE CLINIC | Facility: CLINIC | Age: 84
End: 2019-03-21

## 2019-03-21 NOTE — TELEPHONE ENCOUNTER
Received a call from [employer] stating that the brand celebrex brand is $230, and the generic is $24  So they requested call back to see if generic is ok    Ref # is [id number]    Spoke with Dr Km Burton and she ok'd generic  Called and updated [facility]  They put it through for Generic

## 2019-04-03 DIAGNOSIS — F41.9 ANXIETY: ICD-10-CM

## 2019-04-03 RX ORDER — ALPRAZOLAM 0.25 MG/1
0.25 TABLET ORAL 2 TIMES DAILY PRN
Qty: 180 TABLET | Refills: 0 | Status: SHIPPED | OUTPATIENT
Start: 2019-04-03 | End: 2019-04-08 | Stop reason: SDUPTHER

## 2019-04-08 DIAGNOSIS — F41.9 ANXIETY: ICD-10-CM

## 2019-04-08 RX ORDER — ALPRAZOLAM 0.25 MG/1
0.25 TABLET ORAL 2 TIMES DAILY PRN
Qty: 180 TABLET | Refills: 0 | Status: SHIPPED | OUTPATIENT
Start: 2019-04-08 | End: 2020-03-31 | Stop reason: SDUPTHER

## 2019-04-28 DIAGNOSIS — Z78.0 POSTMENOPAUSAL: ICD-10-CM

## 2019-04-29 RX ORDER — ESTROGENS, CONJUGATED 1.25 MG
TABLET ORAL
Qty: 90 TABLET | Refills: 3 | Status: SHIPPED | OUTPATIENT
Start: 2019-04-29 | End: 2020-04-23

## 2019-05-13 ENCOUNTER — TELEPHONE (OUTPATIENT)
Dept: HEMATOLOGY ONCOLOGY | Facility: CLINIC | Age: 84
End: 2019-05-13

## 2019-05-21 ENCOUNTER — APPOINTMENT (OUTPATIENT)
Dept: LAB | Facility: HOSPITAL | Age: 84
End: 2019-05-21
Payer: MEDICARE

## 2019-05-21 ENCOUNTER — OFFICE VISIT (OUTPATIENT)
Dept: INTERNAL MEDICINE CLINIC | Facility: CLINIC | Age: 84
End: 2019-05-21
Payer: MEDICARE

## 2019-05-21 VITALS
SYSTOLIC BLOOD PRESSURE: 140 MMHG | HEART RATE: 73 BPM | WEIGHT: 121 LBS | BODY MASS INDEX: 26.1 KG/M2 | HEIGHT: 57 IN | TEMPERATURE: 97.3 F | OXYGEN SATURATION: 96 % | DIASTOLIC BLOOD PRESSURE: 60 MMHG

## 2019-05-21 DIAGNOSIS — I10 BENIGN ESSENTIAL HYPERTENSION: ICD-10-CM

## 2019-05-21 DIAGNOSIS — E03.9 HYPOTHYROIDISM, UNSPECIFIED TYPE: ICD-10-CM

## 2019-05-21 DIAGNOSIS — I27.21 PULMONARY ARTERY HYPERTENSION (HCC): ICD-10-CM

## 2019-05-21 DIAGNOSIS — E78.5 DYSLIPIDEMIA: ICD-10-CM

## 2019-05-21 DIAGNOSIS — N18.30 CKD (CHRONIC KIDNEY DISEASE) STAGE 3, GFR 30-59 ML/MIN (HCC): ICD-10-CM

## 2019-05-21 DIAGNOSIS — I10 BENIGN ESSENTIAL HYPERTENSION: Primary | ICD-10-CM

## 2019-05-21 DIAGNOSIS — M06.9 RHEUMATOID ARTHRITIS INVOLVING MULTIPLE SITES, UNSPECIFIED RHEUMATOID FACTOR PRESENCE: ICD-10-CM

## 2019-05-21 PROBLEM — I48.0 PAROXYSMAL ATRIAL FIBRILLATION (HCC): Status: ACTIVE | Noted: 2019-02-26

## 2019-05-21 LAB
ALBUMIN SERPL BCP-MCNC: 3.9 G/DL (ref 3.5–5)
ALP SERPL-CCNC: 59 U/L (ref 46–116)
ALT SERPL W P-5'-P-CCNC: 21 U/L (ref 12–78)
ANION GAP SERPL CALCULATED.3IONS-SCNC: 8 MMOL/L (ref 4–13)
AST SERPL W P-5'-P-CCNC: 32 U/L (ref 5–45)
BASOPHILS # BLD MANUAL: 0.39 THOUSAND/UL (ref 0–0.1)
BASOPHILS NFR MAR MANUAL: 2 % (ref 0–1)
BILIRUB SERPL-MCNC: 0.7 MG/DL (ref 0.2–1)
BUN SERPL-MCNC: 29 MG/DL (ref 5–25)
CALCIUM SERPL-MCNC: 9.2 MG/DL (ref 8.3–10.1)
CHLORIDE SERPL-SCNC: 90 MMOL/L (ref 100–108)
CHOLEST SERPL-MCNC: 257 MG/DL (ref 50–200)
CO2 SERPL-SCNC: 29 MMOL/L (ref 21–32)
CREAT SERPL-MCNC: 1.34 MG/DL (ref 0.6–1.3)
EOSINOPHIL # BLD MANUAL: 0.19 THOUSAND/UL (ref 0–0.4)
EOSINOPHIL NFR BLD MANUAL: 1 % (ref 0–6)
ERYTHROCYTE [DISTWIDTH] IN BLOOD BY AUTOMATED COUNT: 12.1 % (ref 11.6–15.1)
GFR SERPL CREATININE-BSD FRML MDRD: 36 ML/MIN/1.73SQ M
GLUCOSE P FAST SERPL-MCNC: 79 MG/DL (ref 65–99)
HCT VFR BLD AUTO: 36.9 % (ref 34.8–46.1)
HDLC SERPL-MCNC: 103 MG/DL (ref 40–60)
HGB BLD-MCNC: 12.4 G/DL (ref 11.5–15.4)
LDLC SERPL CALC-MCNC: 136 MG/DL (ref 0–100)
LYMPHOCYTES # BLD AUTO: 10.27 THOUSAND/UL (ref 0.6–4.47)
LYMPHOCYTES # BLD AUTO: 53 % (ref 14–44)
MCH RBC QN AUTO: 30.4 PG (ref 26.8–34.3)
MCHC RBC AUTO-ENTMCNC: 33.6 G/DL (ref 31.4–37.4)
MCV RBC AUTO: 90 FL (ref 82–98)
METAMYELOCYTES NFR BLD MANUAL: 1 % (ref 0–1)
MONOCYTES # BLD AUTO: 0.77 THOUSAND/UL (ref 0–1.22)
MONOCYTES NFR BLD: 4 % (ref 4–12)
NEUTROPHILS # BLD MANUAL: 6.78 THOUSAND/UL (ref 1.85–7.62)
NEUTS SEG NFR BLD AUTO: 35 % (ref 43–75)
NONHDLC SERPL-MCNC: 154 MG/DL
NRBC BLD AUTO-RTO: 0 /100 WBCS
PLATELET # BLD AUTO: 225 THOUSANDS/UL (ref 149–390)
PLATELET BLD QL SMEAR: ADEQUATE
PMV BLD AUTO: 11.3 FL (ref 8.9–12.7)
POTASSIUM SERPL-SCNC: 3.4 MMOL/L (ref 3.5–5.3)
PROT SERPL-MCNC: 7.5 G/DL (ref 6.4–8.2)
RBC # BLD AUTO: 4.08 MILLION/UL (ref 3.81–5.12)
SODIUM SERPL-SCNC: 127 MMOL/L (ref 136–145)
TOTAL CELLS COUNTED SPEC: 100
TRIGL SERPL-MCNC: 91 MG/DL
TSH SERPL DL<=0.05 MIU/L-ACNC: 2.32 UIU/ML (ref 0.36–3.74)
VARIANT LYMPHS # BLD AUTO: 4 %
WBC # BLD AUTO: 19.37 THOUSAND/UL (ref 4.31–10.16)

## 2019-05-21 PROCEDURE — 99214 OFFICE O/P EST MOD 30 MIN: CPT | Performed by: FAMILY MEDICINE

## 2019-05-21 PROCEDURE — 36415 COLL VENOUS BLD VENIPUNCTURE: CPT

## 2019-05-21 PROCEDURE — 80061 LIPID PANEL: CPT

## 2019-05-21 PROCEDURE — 84443 ASSAY THYROID STIM HORMONE: CPT

## 2019-05-21 PROCEDURE — 85007 BL SMEAR W/DIFF WBC COUNT: CPT

## 2019-05-21 PROCEDURE — 80053 COMPREHEN METABOLIC PANEL: CPT

## 2019-05-21 PROCEDURE — 85027 COMPLETE CBC AUTOMATED: CPT

## 2019-05-29 DIAGNOSIS — I10 BENIGN ESSENTIAL HYPERTENSION: ICD-10-CM

## 2019-05-29 DIAGNOSIS — N18.30 CKD (CHRONIC KIDNEY DISEASE) STAGE 3, GFR 30-59 ML/MIN (HCC): ICD-10-CM

## 2019-05-29 DIAGNOSIS — E87.1 HYPONATREMIA: Primary | ICD-10-CM

## 2019-06-14 DIAGNOSIS — M06.9 RHEUMATOID ARTHRITIS INVOLVING MULTIPLE SITES, UNSPECIFIED RHEUMATOID FACTOR PRESENCE: ICD-10-CM

## 2019-06-18 ENCOUNTER — TELEPHONE (OUTPATIENT)
Dept: INTERNAL MEDICINE CLINIC | Facility: CLINIC | Age: 84
End: 2019-06-18

## 2019-06-19 ENCOUNTER — APPOINTMENT (OUTPATIENT)
Dept: LAB | Facility: HOSPITAL | Age: 84
End: 2019-06-19
Attending: INTERNAL MEDICINE
Payer: MEDICARE

## 2019-06-19 DIAGNOSIS — E87.1 HYPONATREMIA: ICD-10-CM

## 2019-06-19 DIAGNOSIS — C91.10 CLL (CHRONIC LYMPHOCYTIC LEUKEMIA) (HCC): ICD-10-CM

## 2019-06-19 DIAGNOSIS — I10 BENIGN ESSENTIAL HYPERTENSION: ICD-10-CM

## 2019-06-19 DIAGNOSIS — N18.30 CKD (CHRONIC KIDNEY DISEASE) STAGE 3, GFR 30-59 ML/MIN (HCC): ICD-10-CM

## 2019-06-19 LAB
ALBUMIN SERPL BCP-MCNC: 3.8 G/DL (ref 3.5–5)
ALP SERPL-CCNC: 63 U/L (ref 46–116)
ALT SERPL W P-5'-P-CCNC: 24 U/L (ref 12–78)
ANION GAP SERPL CALCULATED.3IONS-SCNC: 6 MMOL/L (ref 4–13)
ANISOCYTOSIS BLD QL SMEAR: PRESENT
AST SERPL W P-5'-P-CCNC: 28 U/L (ref 5–45)
BASOPHILS # BLD MANUAL: 0 THOUSAND/UL (ref 0–0.1)
BASOPHILS NFR MAR MANUAL: 0 % (ref 0–1)
BILIRUB SERPL-MCNC: 0.4 MG/DL (ref 0.2–1)
BUN SERPL-MCNC: 24 MG/DL (ref 5–25)
CALCIUM SERPL-MCNC: 9.2 MG/DL (ref 8.3–10.1)
CHLORIDE SERPL-SCNC: 93 MMOL/L (ref 100–108)
CO2 SERPL-SCNC: 31 MMOL/L (ref 21–32)
CREAT SERPL-MCNC: 1.26 MG/DL (ref 0.6–1.3)
EOSINOPHIL # BLD MANUAL: 0.17 THOUSAND/UL (ref 0–0.4)
EOSINOPHIL NFR BLD MANUAL: 1 % (ref 0–6)
ERYTHROCYTE [DISTWIDTH] IN BLOOD BY AUTOMATED COUNT: 12.2 % (ref 11.6–15.1)
GFR SERPL CREATININE-BSD FRML MDRD: 39 ML/MIN/1.73SQ M
GLUCOSE P FAST SERPL-MCNC: 77 MG/DL (ref 65–99)
HCT VFR BLD AUTO: 36.6 % (ref 34.8–46.1)
HGB BLD-MCNC: 12.1 G/DL (ref 11.5–15.4)
LYMPHOCYTES # BLD AUTO: 11.66 THOUSAND/UL (ref 0.6–4.47)
LYMPHOCYTES # BLD AUTO: 69 % (ref 14–44)
MCH RBC QN AUTO: 30.3 PG (ref 26.8–34.3)
MCHC RBC AUTO-ENTMCNC: 33.1 G/DL (ref 31.4–37.4)
MCV RBC AUTO: 92 FL (ref 82–98)
MONOCYTES # BLD AUTO: 0.68 THOUSAND/UL (ref 0–1.22)
MONOCYTES NFR BLD: 4 % (ref 4–12)
NEUTROPHILS # BLD MANUAL: 3.55 THOUSAND/UL (ref 1.85–7.62)
NEUTS SEG NFR BLD AUTO: 21 % (ref 43–75)
NRBC BLD AUTO-RTO: 0 /100 WBCS
PLATELET # BLD AUTO: 233 THOUSANDS/UL (ref 149–390)
PLATELET BLD QL SMEAR: ADEQUATE
PMV BLD AUTO: 11.1 FL (ref 8.9–12.7)
POTASSIUM SERPL-SCNC: 3.5 MMOL/L (ref 3.5–5.3)
PROT SERPL-MCNC: 7.5 G/DL (ref 6.4–8.2)
RBC # BLD AUTO: 4 MILLION/UL (ref 3.81–5.12)
SODIUM SERPL-SCNC: 130 MMOL/L (ref 136–145)
TOTAL CELLS COUNTED SPEC: 100
VARIANT LYMPHS # BLD AUTO: 5 %
WBC # BLD AUTO: 16.9 THOUSAND/UL (ref 4.31–10.16)

## 2019-06-19 PROCEDURE — 85007 BL SMEAR W/DIFF WBC COUNT: CPT

## 2019-06-19 PROCEDURE — 85027 COMPLETE CBC AUTOMATED: CPT

## 2019-06-19 PROCEDURE — 36415 COLL VENOUS BLD VENIPUNCTURE: CPT

## 2019-06-19 PROCEDURE — 80053 COMPREHEN METABOLIC PANEL: CPT

## 2019-06-21 ENCOUNTER — OFFICE VISIT (OUTPATIENT)
Dept: HEMATOLOGY ONCOLOGY | Facility: HOSPITAL | Age: 84
End: 2019-06-21
Payer: MEDICARE

## 2019-06-21 VITALS
HEIGHT: 58 IN | HEART RATE: 92 BPM | DIASTOLIC BLOOD PRESSURE: 74 MMHG | SYSTOLIC BLOOD PRESSURE: 138 MMHG | OXYGEN SATURATION: 95 % | WEIGHT: 119.6 LBS | BODY MASS INDEX: 25.11 KG/M2 | TEMPERATURE: 97.8 F | RESPIRATION RATE: 17 BRPM

## 2019-06-21 DIAGNOSIS — C91.10 CLL (CHRONIC LYMPHOCYTIC LEUKEMIA) (HCC): Primary | ICD-10-CM

## 2019-06-21 DIAGNOSIS — N18.30 CHRONIC RENAL IMPAIRMENT, STAGE 3 (MODERATE) (HCC): ICD-10-CM

## 2019-06-21 DIAGNOSIS — R60.9 PERIPHERAL EDEMA: ICD-10-CM

## 2019-06-21 PROCEDURE — 99213 OFFICE O/P EST LOW 20 MIN: CPT | Performed by: INTERNAL MEDICINE

## 2019-07-01 DIAGNOSIS — I10 ESSENTIAL HYPERTENSION: ICD-10-CM

## 2019-07-01 RX ORDER — TRIAMTERENE AND HYDROCHLOROTHIAZIDE 37.5; 25 MG/1; MG/1
TABLET ORAL
Qty: 90 TABLET | Refills: 1 | Status: SHIPPED | OUTPATIENT
Start: 2019-07-01 | End: 2019-12-28 | Stop reason: SDUPTHER

## 2019-08-20 DIAGNOSIS — J30.1 ALLERGIC RHINITIS DUE TO POLLEN, UNSPECIFIED SEASONALITY: ICD-10-CM

## 2019-08-20 RX ORDER — MONTELUKAST SODIUM 10 MG/1
TABLET ORAL
Qty: 90 TABLET | Refills: 4 | Status: SHIPPED | OUTPATIENT
Start: 2019-08-20 | End: 2020-11-12

## 2019-09-24 ENCOUNTER — OFFICE VISIT (OUTPATIENT)
Dept: INTERNAL MEDICINE CLINIC | Facility: CLINIC | Age: 84
End: 2019-09-24
Payer: MEDICARE

## 2019-09-24 ENCOUNTER — APPOINTMENT (OUTPATIENT)
Dept: LAB | Facility: HOSPITAL | Age: 84
End: 2019-09-24
Payer: MEDICARE

## 2019-09-24 VITALS
OXYGEN SATURATION: 99 % | HEIGHT: 58 IN | HEART RATE: 57 BPM | SYSTOLIC BLOOD PRESSURE: 140 MMHG | DIASTOLIC BLOOD PRESSURE: 80 MMHG | TEMPERATURE: 97.5 F | BODY MASS INDEX: 24.98 KG/M2 | WEIGHT: 119 LBS

## 2019-09-24 DIAGNOSIS — M47.812 OSTEOARTHRITIS OF CERVICAL SPINE, UNSPECIFIED SPINAL OSTEOARTHRITIS COMPLICATION STATUS: ICD-10-CM

## 2019-09-24 DIAGNOSIS — I10 BENIGN ESSENTIAL HYPERTENSION: Primary | ICD-10-CM

## 2019-09-24 DIAGNOSIS — C91.10 CLL (CHRONIC LYMPHOCYTIC LEUKEMIA) (HCC): ICD-10-CM

## 2019-09-24 DIAGNOSIS — N18.30 CKD (CHRONIC KIDNEY DISEASE) STAGE 3, GFR 30-59 ML/MIN (HCC): ICD-10-CM

## 2019-09-24 DIAGNOSIS — M06.9 RHEUMATOID ARTHRITIS INVOLVING MULTIPLE SITES, UNSPECIFIED RHEUMATOID FACTOR PRESENCE: ICD-10-CM

## 2019-09-24 DIAGNOSIS — I48.0 PAROXYSMAL ATRIAL FIBRILLATION (HCC): ICD-10-CM

## 2019-09-24 DIAGNOSIS — I10 BENIGN ESSENTIAL HYPERTENSION: ICD-10-CM

## 2019-09-24 DIAGNOSIS — M40.209 KYPHOSIS, UNSPECIFIED KYPHOSIS TYPE, UNSPECIFIED SPINAL REGION: ICD-10-CM

## 2019-09-24 DIAGNOSIS — E03.9 HYPOTHYROIDISM, UNSPECIFIED TYPE: ICD-10-CM

## 2019-09-24 DIAGNOSIS — I08.3 COMBINED DISORDERS OF MITRAL, AORTIC AND TRICUSPID VALVES: ICD-10-CM

## 2019-09-24 LAB
ALBUMIN SERPL BCP-MCNC: 3.7 G/DL (ref 3.5–5)
ALP SERPL-CCNC: 57 U/L (ref 46–116)
ALT SERPL W P-5'-P-CCNC: 16 U/L (ref 12–78)
ANION GAP SERPL CALCULATED.3IONS-SCNC: 8 MMOL/L (ref 4–13)
AST SERPL W P-5'-P-CCNC: 28 U/L (ref 5–45)
BASOPHILS # BLD AUTO: 0.09 THOUSANDS/ΜL (ref 0–0.1)
BASOPHILS NFR BLD AUTO: 1 % (ref 0–1)
BILIRUB SERPL-MCNC: 0.6 MG/DL (ref 0.2–1)
BUN SERPL-MCNC: 29 MG/DL (ref 5–25)
CALCIUM SERPL-MCNC: 9.2 MG/DL (ref 8.3–10.1)
CHLORIDE SERPL-SCNC: 94 MMOL/L (ref 100–108)
CHOLEST SERPL-MCNC: 245 MG/DL (ref 50–200)
CO2 SERPL-SCNC: 28 MMOL/L (ref 21–32)
CREAT SERPL-MCNC: 1.21 MG/DL (ref 0.6–1.3)
EOSINOPHIL # BLD AUTO: 0.14 THOUSAND/ΜL (ref 0–0.61)
EOSINOPHIL NFR BLD AUTO: 1 % (ref 0–6)
ERYTHROCYTE [DISTWIDTH] IN BLOOD BY AUTOMATED COUNT: 12.2 % (ref 11.6–15.1)
GFR SERPL CREATININE-BSD FRML MDRD: 41 ML/MIN/1.73SQ M
GLUCOSE P FAST SERPL-MCNC: 78 MG/DL (ref 65–99)
HCT VFR BLD AUTO: 35.4 % (ref 34.8–46.1)
HDLC SERPL-MCNC: 98 MG/DL (ref 40–60)
HGB BLD-MCNC: 11.8 G/DL (ref 11.5–15.4)
IMM GRANULOCYTES # BLD AUTO: 0.04 THOUSAND/UL (ref 0–0.2)
IMM GRANULOCYTES NFR BLD AUTO: 0 % (ref 0–2)
LDLC SERPL CALC-MCNC: 130 MG/DL (ref 0–100)
LYMPHOCYTES # BLD AUTO: 12.93 THOUSANDS/ΜL (ref 0.6–4.47)
LYMPHOCYTES NFR BLD AUTO: 66 % (ref 14–44)
MCH RBC QN AUTO: 30.3 PG (ref 26.8–34.3)
MCHC RBC AUTO-ENTMCNC: 33.3 G/DL (ref 31.4–37.4)
MCV RBC AUTO: 91 FL (ref 82–98)
MONOCYTES # BLD AUTO: 0.68 THOUSAND/ΜL (ref 0.17–1.22)
MONOCYTES NFR BLD AUTO: 4 % (ref 4–12)
NEUTROPHILS # BLD AUTO: 5.27 THOUSANDS/ΜL (ref 1.85–7.62)
NEUTS SEG NFR BLD AUTO: 28 % (ref 43–75)
NONHDLC SERPL-MCNC: 147 MG/DL
NRBC BLD AUTO-RTO: 0 /100 WBCS
PLATELET # BLD AUTO: 223 THOUSANDS/UL (ref 149–390)
PMV BLD AUTO: 11.7 FL (ref 8.9–12.7)
POTASSIUM SERPL-SCNC: 3.3 MMOL/L (ref 3.5–5.3)
PROT SERPL-MCNC: 7.3 G/DL (ref 6.4–8.2)
RBC # BLD AUTO: 3.89 MILLION/UL (ref 3.81–5.12)
SODIUM SERPL-SCNC: 130 MMOL/L (ref 136–145)
TRIGL SERPL-MCNC: 83 MG/DL
TSH SERPL DL<=0.05 MIU/L-ACNC: 0.46 UIU/ML (ref 0.36–3.74)
WBC # BLD AUTO: 19.15 THOUSAND/UL (ref 4.31–10.16)

## 2019-09-24 PROCEDURE — 99214 OFFICE O/P EST MOD 30 MIN: CPT | Performed by: FAMILY MEDICINE

## 2019-09-24 PROCEDURE — 80053 COMPREHEN METABOLIC PANEL: CPT

## 2019-09-24 PROCEDURE — 84443 ASSAY THYROID STIM HORMONE: CPT

## 2019-09-24 PROCEDURE — 36415 COLL VENOUS BLD VENIPUNCTURE: CPT

## 2019-09-24 PROCEDURE — 80061 LIPID PANEL: CPT

## 2019-09-24 PROCEDURE — 85025 COMPLETE CBC W/AUTO DIFF WBC: CPT

## 2019-09-24 NOTE — PATIENT INSTRUCTIONS
Heart Healthy Diet   WHAT YOU NEED TO KNOW:   What is a heart healthy diet? A heart healthy diet is an eating plan low in total fat, unhealthy fats, and sodium (salt)  A heart healthy diet helps decrease your risk for heart disease and stroke  Limit the amount of fat you eat to 25% to 35% of your total daily calories  Limit sodium to less than 2,300 mg each day  What is healthy fat, and where is it found? Healthy fats can help improve cholesterol levels  The risk for heart disease is decreased when cholesterol levels are normal  Choose healthy fats, such as the following:  · Unsaturated fat  is found in foods such as soybean, canola, olive, corn, and safflower oils  It is also found in soft tub margarine that is made with liquid vegetable oil  · Omega-3 fat  is found in certain fish, such as salmon, tuna, and trout, and in walnuts and flaxseed  What is unhealthy fat, and where is it found? Unhealthy fats can cause unhealthy cholesterol levels in your blood and increase your risk of heart disease  Limit unhealthy fats, such as the following:  · Cholesterol  is found in animal foods, such as eggs and lobster, and in dairy products made from whole milk  Limit cholesterol to less than 300 milligrams (mg) each day  You may need to limit cholesterol to 200 mg each day if you have heart disease  · Saturated fat  is found in meats, such as canales and hamburger  It is also found in chicken or turkey skin, whole milk, and butter  Limit saturated fat to less than 7% of your total daily calories  Limit saturated fat to less than 6% if you have heart disease or are at increased risk for it  · Trans fat  is found in packaged foods, such as potato chips and cookies  It is also in hard margarine, some fried foods, and shortening  Avoid trans fats as much as possible  What can I eat and drink on a heart healthy diet?   Ask your dietitian or healthcare provider how many servings to have from each of the following food groups:  · Grains:      ¨ Whole-wheat breads, cereals, and pastas, and brown rice    ¨ Low-fat, low-sodium crackers and chips    · Vegetables:      ¨ Broccoli, green beans, green peas, and spinach    ¨ Collards, kale, and lima beans    ¨ Carrots, sweet potatoes, tomatoes, and peppers    ¨ Canned vegetables with no salt added    · Fruits:      ¨ Bananas, peaches, pears, and pineapple    ¨ Grapes, raisins, and dates    ¨ Oranges, tangerines, grapefruit, orange juice, and grapefruit juice    ¨ Apricots, mangoes, melons, and papaya    ¨ Raspberries and strawberries    ¨ Canned fruit with no added sugar    · Low-fat dairy products:      ¨ Nonfat (skim) milk, 1% milk, and low-fat almond, cashew, or soy milks fortified with calcium    ¨ Low-fat cheese, regular or frozen yogurt, and cottage cheese    · Meats and proteins , such as lean cuts of beef and pork (loin, leg, round), skinless chicken and turkey, legumes, soy products, egg whites, and nuts  Which foods and drinks do I need to limit or avoid?   Ask your dietitian or healthcare provider about these and other foods that are high in unhealthy fat, sodium, and sugar:  · Snack or packaged foods , such as frozen dinners, cookies, macaroni and cheese, and cereals with more than 300 mg of sodium per serving    · Canned or dry mixes  for cakes, soups, sauces, or gravies    · Vegetables with added sodium , such as instant potatoes, vegetables with added sauces, or regular canned vegetables    · Other foods high in sodium , such as ketchup, barbecue sauce, salad dressing, pickles, olives, soy sauce, and miso    · High-fat dairy foods  such as whole or 2% milk, cream cheese, or sour cream, and cheeses     · High-fat protein foods  such as high-fat cuts of beef (T-bone steaks, ribs), chicken or turkey with skin, and organ meats, such as liver    · Cured or smoked meats , such as hot dogs, canales, and sausage    · Unhealthy fats and oils , such as butter, stick margarine, shortening, and cooking oils such as coconut or palm oil    · Food and drinks high in sugar , such as soft drinks (soda), sports drinks, sweetened tea, candy, cake, cookies, pies, and doughnuts  What other diet guidelines should I follow? · Eat more foods containing omega-3 fats  Eat fish high in omega-3 fats at least 2 times a week  · Limit alcohol  Too much alcohol can damage your heart and raise your blood pressure  Women should limit alcohol to 1 drink a day  Men should limit alcohol to 2 drinks a day  A drink of alcohol is 12 ounces of beer, 5 ounces of wine, or 1½ ounces of liquor  · Choose low-sodium foods  High-sodium foods can lead to high blood pressure  Add little or no salt to food you prepare  Use herbs and spices in place of salt  · Eat more fiber  to help lower cholesterol levels  Eat at least 5 servings of fruits and vegetables each day  Eat 3 ounces of whole-grain foods each day  Legumes (beans) are also a good source of fiber  What lifestyle guidelines should I follow? · Do not smoke  Nicotine and other chemicals in cigarettes and cigars can cause lung and heart damage  Ask your healthcare provider for information if you currently smoke and need help to quit  E-cigarettes or smokeless tobacco still contain nicotine  Talk to your healthcare provider before you use these products  · Exercise regularly  to help you maintain a healthy weight and improve your blood pressure and cholesterol levels  Ask your healthcare provider about the best exercise plan for you  Do not start an exercise program without asking your healthcare provider  CARE AGREEMENT:   You have the right to help plan your care  Discuss treatment options with your caregivers to decide what care you want to receive  You always have the right to refuse treatment  The above information is an  only  It is not intended as medical advice for individual conditions or treatments   Talk to your doctor, nurse or pharmacist before following any medical regimen to see if it is safe and effective for you  © 2017 [employer] Information is for End User's use only and may not be sold, redistributed or otherwise used for commercial purposes  All illustrations and images included in CareNotes® are the copyrighted property of [employer]  or [employer]eb[employer] Pfeiffer

## 2019-09-25 NOTE — PROGRESS NOTES
Assessment/Plan:    Hypothyroidism  She did not have her laboratory testing done prior to this visit  Lab slip was reprinted and she plans on having this done today  Our office will call her with the results and adjust doses if needed  Benign essential hypertension  Blood pressure relatively well controlled  Continue current medications  Watch salt intake  Watch for any orthostatics changes symptoms  Continue follow-up with Cardiology on a regular basis  Combined disorders of mitral, aortic and tricuspid valves  Continue follow-up with Cardiology  Continue with echocardiograms as per their recommendations  Paroxysmal atrial fibrillation (HCC)  Currently regular on exam but with some ectopy  Continue follow-up with Cardiology  Cervical osteoarthritis  Follow-up with Dr Nika Mas as planned this week  Discussed options with him  Continue with the Celebrex since this gives her some relief  She does understand the risks involved with this especially with the history of some mild renal insufficiency  Continue to be as active as possible but be careful to avoid falls  RA (rheumatoid arthritis) (HCC)  Regular follow-up with Rheumatology recommended  Continue with the Celebrex  Tylenol Arthritis can be used as well if needed  CKD (chronic kidney disease) stage 3, GFR 30-59 ml/min  Creatinine mildly elevated but stable  She understands the risks associated with the Celebrex but feels that she would not even be able to function at this point without it  Risks and benefits discussed  Diagnoses and all orders for this visit:    Benign essential hypertension  -     CBC and differential; Future  -     Comprehensive metabolic panel; Future  -     Lipid panel; Future    Combined disorders of mitral, aortic and tricuspid valves    Paroxysmal atrial fibrillation (HCC)  -     Comprehensive metabolic panel;  Future    Osteoarthritis of cervical spine, unspecified spinal osteoarthritis complication status    Kyphosis, unspecified kyphosis type, unspecified spinal region    Rheumatoid arthritis involving multiple sites, unspecified rheumatoid factor presence (HCC)  -     CELEBREX 200 MG capsule; Take 1 capsule (200 mg total) by mouth 2 (two) times a day BRAND NECESSARY    CKD (chronic kidney disease) stage 3, GFR 30-59 ml/min (HCC)    CLL (chronic lymphocytic leukemia) ([facility])  -     CBC and differential; Future    Hypothyroidism, unspecified type  -     TSH, 3rd generation; Future          Subjective:      Patient ID: Ivania Novak is a 80 y o  female  She presents for routine follow-up  Has been having persistent and slightly increased pain  Pain has been severe into the neck with radiation into the facial area as well as the arms bilaterally  She is unsure how much of this is associated with the neck or related to the cubital and carpal tunnels that she has had previously  Increased stiffness and pain into the neck and arm areas  Some decreased dexterity  Tries to remain as active as possible but pain is now limiting her activities  Will be following up with neck/back specialist later this week  Continues with the Celebrex  She does understand the risks involved in this with her known renal insufficiency but she would basically be unable to tolerate the pain otherwise  She has been on generic Celebrex in the past and this is basically completely ineffective for her  The generic Celebrex provides minimal pain relief  Has tolerated the brand name well and this provides decreased pain especially into her hands, arms, and neck  Appetite has been generally stable  Denies any nausea, vomiting, or diarrhea  Tolerating blood pressure medications without difficulty  Denies any headaches or localized weakness  Usually sleeps relatively well  Appetite has been good  Continues to follow-up with Cardiology on a regular basis regarding the valvular abnormalities        The following portions of the patient's history were reviewed and updated as appropriate:   She  has a past medical history of Carpal tunnel syndrome, Cubital tunnel syndrome on left, Dyslipidemia, Ectopic heartbeats, Hypertension, Malignant melanoma of skin ([facility] ), Premature atrial contraction, and Transient ischemic attack  She   Patient Active Problem List    Diagnosis Date Noted    Peripheral edema 2019    Paroxysmal atrial fibrillation (HCC) 2019    Lung nodules 2019    Kyphosis 10/23/2018    Venous stasis 2018    CKD (chronic kidney disease) stage 3, GFR 30-59 ml/min (HCC) 2018    CLL (chronic lymphocytic leukemia) ([facility] [facility] ) 2018    History of thoracotomy 2018    History of D&C 2018    History of lumpectomy of right breast 2018    History of pneumothorax 2018    Hx of emergency  section 2018     (spontaneous vaginal delivery) 2018    Hemoptysis 2018    Pulmonary disease 2018    RA (rheumatoid arthritis) ([facility] [facility] ) 2018    Cervical osteoarthritis 2017    Hypokalemia 2017    Lumbar stenosis 2017    Hyponatremia 10/31/2016    Premature atrial contraction 2016    Scoliosis 2016    Trochanteric bursitis of both hips 2015    Dyslipidemia 2014    Combined disorders of mitral, aortic and tricuspid valves 2014    Chronic obstructive pulmonary disease ([address] ) 2013    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    Pulmonary emphysema ([facility] ) 2013    Chronic kidney insufficiency 2012    Esophageal reflux 2012    Benign essential hypertension 2012    Hypothyroidism 2012    Osteoarthritis 2012    Spinal stenosis 2012    History of blood transfusion 1966     She  has a past surgical history that includes Appendectomy; Breast lumpectomy; Cataract extraction;  section;  Anterior release vertebral body w/ posterior fusion; Hysterectomy; Lumbar laminectomy; NEUROPLASTY; Oophorectomy; Thoracoscopy w/ talc pleurodesis; and Tonsillectomy  Her family history includes Cancer in her family; Colon cancer in her mother; Diabetes in her maternal uncle and paternal aunt; Heart disease in her father; Hypertension in her mother; Rheum arthritis in her father; Stroke in her family  She  reports that she has never smoked  She has never used smokeless tobacco  She reports that she drinks alcohol  She reports that she does not use drugs    Current Outpatient Medications   Medication Sig Dispense Refill    ALPRAZolam (XANAX) 0 25 mg tablet Take 1 tablet (0 25 mg total) by mouth 2 (two) times a day as needed for anxiety 180 tablet 0    ascorbic acid (VITAMIN C) 250 mg tablet Take 1 tablet by mouth daily      calcium-vitamin D (OSCAL-500) 500-400 MG-UNIT per tablet Take by mouth      CELEBREX 200 MG capsule Take 1 capsule (200 mg total) by mouth 2 (two) times a day BRAND NECESSARY 180 capsule 1    Cholecalciferol (VITAMIN D3) 2000 units capsule Take by mouth      coenzyme Q-10 100 MG capsule Take by mouth      Cranberry 400 MG CAPS Take by mouth      levothyroxine 100 mcg tablet Take 1 tablet (100 mcg total) by mouth daily 90 tablet 3    losartan (COZAAR) 50 mg tablet TAKE ONE-HALF (1/2) TABLET DAILY 45 tablet 2    montelukast (SINGULAIR) 10 mg tablet TAKE 1 TABLET DAILY 90 tablet 4    moxifloxacin (VIGAMOX) 0 5 % ophthalmic solution Apply to eye      olopatadine HCl (PATADAY) 0 2 % opth drops Apply to eye      omeprazole (PRILOSEC) 20 mg delayed release capsule Take 20 mg by mouth      PREMARIN 1 25 MG tablet TAKE 1 TABLET DAILY 90 tablet 3    pyridoxine (VITAMIN B6) 100 mg tablet Take 50 mg by mouth daily        triamterene-hydrochlorothiazide (MAXZIDE-25) 37 5-25 mg per tablet TAKE 1 TABLET DAILY 90 tablet 1    Vitamin E 200 units TABS Take 1 tablet by mouth daily      Zinc Gluconate 15 MG TABS Take 1 tablet by mouth daily       No current facility-administered medications for this visit  Current Outpatient Medications on File Prior to Visit   Medication Sig    ALPRAZolam (XANAX) 0 25 mg tablet Take 1 tablet (0 25 mg total) by mouth 2 (two) times a day as needed for anxiety    ascorbic acid (VITAMIN C) 250 mg tablet Take 1 tablet by mouth daily    calcium-vitamin D (OSCAL-500) 500-400 MG-UNIT per tablet Take by mouth    Cholecalciferol (VITAMIN D3) 2000 units capsule Take by mouth    coenzyme Q-10 100 MG capsule Take by mouth    Cranberry 400 MG CAPS Take by mouth    levothyroxine 100 mcg tablet Take 1 tablet (100 mcg total) by mouth daily    losartan (COZAAR) 50 mg tablet TAKE ONE-HALF (1/2) TABLET DAILY    montelukast (SINGULAIR) 10 mg tablet TAKE 1 TABLET DAILY    moxifloxacin (VIGAMOX) 0 5 % ophthalmic solution Apply to eye    olopatadine HCl (PATADAY) 0 2 % opth drops Apply to eye    omeprazole (PRILOSEC) 20 mg delayed release capsule Take 20 mg by mouth    PREMARIN 1 25 MG tablet TAKE 1 TABLET DAILY    pyridoxine (VITAMIN B6) 100 mg tablet Take 50 mg by mouth daily      triamterene-hydrochlorothiazide (MAXZIDE-25) 37 5-25 mg per tablet TAKE 1 TABLET DAILY    Vitamin E 200 units TABS Take 1 tablet by mouth daily    Zinc Gluconate 15 MG TABS Take 1 tablet by mouth daily     No current facility-administered medications on file prior to visit  She is allergic to aspirin; codeine polt-chlorphen polt er; etanercept; lorazepam; morphine; oxycodone; penicillins; povidone; povidone iodine; and prednisone       Review of Systems   Constitutional: Positive for fatigue  Negative for activity change, appetite change, chills and fever  HENT: Negative for congestion and rhinorrhea  Eyes: Negative for visual disturbance  Respiratory: Negative for cough, chest tightness and shortness of breath  Cardiovascular: Negative for chest pain and palpitations     Gastrointestinal: Negative for abdominal pain, blood in stool, diarrhea, nausea and vomiting  Endocrine: Negative for polydipsia, polyphagia and polyuria  Genitourinary: Negative for dysuria, frequency and urgency  Musculoskeletal: Positive for arthralgias, back pain, gait problem, neck pain and neck stiffness  Skin: Negative for color change  Allergic/Immunologic: Positive for environmental allergies  Neurological: Positive for numbness  Negative for dizziness and headaches  Hematological: Does not bruise/bleed easily  Psychiatric/Behavioral: Negative for confusion, decreased concentration and sleep disturbance  The patient is not nervous/anxious  Objective:      /80 (BP Location: Right arm, Patient Position: Sitting, Cuff Size: Standard)   Pulse 57   Temp 97 5 °F (36 4 °C) (Temporal)   Ht 4' 9 5" (1 461 m)   Wt 54 kg (119 lb)   SpO2 99%   BMI 25 31 kg/m²          Physical Exam   Constitutional: She is oriented to person, place, and time  She is cooperative  No distress  HENT:   Head: Normocephalic and atraumatic  Mouth/Throat: Oropharynx is clear and moist    Moist mucous membranes with clear nasal discharge   Eyes: Pupils are equal, round, and reactive to light  Conjunctivae are normal  Right eye exhibits no discharge  Left eye exhibits no discharge  Post surgical changes bilaterally   Neck:   Kyphotic; slight muscle tightness into the paraspinal muscles in the neck   Cardiovascular: Normal rate and regular rhythm  Occasional extrasystoles are present  Exam reveals no gallop and no friction rub  Murmur heard  Systolic murmur is present with a grade of 2/6  Pulmonary/Chest: No respiratory distress  She has decreased breath sounds  She has no wheezes  She has no rales  Abdominal: Bowel sounds are normal  She exhibits no distension  There is no tenderness  Musculoskeletal: She exhibits no edema  High fatigue with diffuse degenerative changes into the neck and back   Lymphadenopathy:     She has no cervical adenopathy  Neurological: She is alert and oriented to person, place, and time  Skin: Skin is warm and dry  Psychiatric: She has a normal mood and affect  Her behavior is normal    Nursing note and vitals reviewed

## 2019-09-25 NOTE — ASSESSMENT & PLAN NOTE
She did not have her laboratory testing done prior to this visit  Lab slip was reprinted and she plans on having this done today  Our office will call her with the results and adjust doses if needed

## 2019-09-25 NOTE — ASSESSMENT & PLAN NOTE
Creatinine mildly elevated but stable  She understands the risks associated with the Celebrex but feels that she would not even be able to function at this point without it  Risks and benefits discussed

## 2019-09-25 NOTE — ASSESSMENT & PLAN NOTE
Regular follow-up with Rheumatology recommended  Continue with the Celebrex  Tylenol Arthritis can be used as well if needed

## 2019-09-25 NOTE — ASSESSMENT & PLAN NOTE
Blood pressure relatively well controlled  Continue current medications  Watch salt intake  Watch for any orthostatics changes symptoms  Continue follow-up with Cardiology on a regular basis

## 2019-09-25 NOTE — ASSESSMENT & PLAN NOTE
Follow-up with Dr Hiwot Stewart as planned this week  Discussed options with him  Continue with the Celebrex since this gives her some relief  She does understand the risks involved with this especially with the history of some mild renal insufficiency  Continue to be as active as possible but be careful to avoid falls

## 2019-10-03 ENCOUNTER — TELEPHONE (OUTPATIENT)
Dept: INTERNAL MEDICINE CLINIC | Facility: CLINIC | Age: 84
End: 2019-10-03

## 2019-10-03 NOTE — TELEPHONE ENCOUNTER
I called and spoke with the patient and informed her that I got her brand celebrex approved  [facility] already contacted her about it

## 2019-10-12 DIAGNOSIS — I10 BENIGN ESSENTIAL HYPERTENSION: ICD-10-CM

## 2019-10-14 ENCOUNTER — HOSPITAL ENCOUNTER (OUTPATIENT)
Dept: NON INVASIVE DIAGNOSTICS | Facility: HOSPITAL | Age: 84
Discharge: HOME | End: 2019-10-14
Attending: INTERNAL MEDICINE
Payer: MEDICARE

## 2019-10-14 ENCOUNTER — HOSPITAL ENCOUNTER (OUTPATIENT)
Dept: CT IMAGING | Facility: HOSPITAL | Age: 84
Discharge: HOME | End: 2019-10-14
Attending: INTERNAL MEDICINE
Payer: MEDICARE

## 2019-10-14 DIAGNOSIS — I08.3 COMBINED DISORDERS OF MITRAL, AORTIC AND TRICUSPID VALVES: ICD-10-CM

## 2019-10-14 DIAGNOSIS — R91.8 LUNG NODULES: ICD-10-CM

## 2019-10-14 PROCEDURE — 93306 TTE W/DOPPLER COMPLETE: CPT | Performed by: INTERNAL MEDICINE

## 2019-10-14 PROCEDURE — 93306 TTE W/DOPPLER COMPLETE: CPT

## 2019-10-14 PROCEDURE — 71250 CT THORAX DX C-: CPT

## 2019-10-14 RX ORDER — LOSARTAN POTASSIUM 50 MG/1
TABLET ORAL
Qty: 45 TABLET | Refills: 4 | Status: SHIPPED | OUTPATIENT
Start: 2019-10-14 | End: 2021-01-22 | Stop reason: SDUPTHER

## 2019-10-16 NOTE — PROGRESS NOTES
Cardiology Follow Up    Betzaida Ordoñez  1933  [id number]  [address] [place] [address]    1  Combined disorders of mitral, aortic and tricuspid valves     2  Pulmonary artery hypertension ([address] )     3  Premature atrial contraction     4  Benign essential hypertension     5  Dyslipidemia         Discussion/Summary:   Mrs Ordoñez is a pleasant 59-year-old female who presents to the office today for routine follow-up  She also has been noting some lower extremity edema  She denies any other signs or symptoms of congestive heart failure  I again advised trial of a different diuretic like torsemide  She continues to decline  She was encouraged to utilize compression stockings and also keep her legs elevated when sitting  She remains intolerant of Imdur or which was prescribed for pulmonary hypertension which did seem to help her shortness of breath  Her blood pressure control appears adequate  She did undergo a repeat echocardiogram since her last visit which reveals mild-to-moderate mitral regurgitation, moderate tricuspid regurgitation with a decrease in her pulmonary artery pressure  She also requested a CT scan of her lungs given her pulmonary nodules  The test is complete but has yet to be read  I will relay the results of the testing to her over the phone  I will see her back in the office in six months for re-evaluation  Interval History:   Mrs Ordoñez is a pleasant 59-year-old female who presents to the office for routine follow-up       Since her last visit she has been physically been feeling the same  She continues with shortness of breath with activity which is unchanged since her last visit  She denies exertional chest discomfort  She continues with persistent lower extremity edema    She notes it is better when she takes losartan  She tempted to utilize Lasix in the past which did not help  She denies any other signs or symptoms of heart failure including paroxysmal nocturnal dyspnea, orthopnea, acute weight gain or increasing abdominal girth  She denies lightheadedness, syncope or presyncope  She denies palpitations or symptoms of claudication  Problem List     Cervical osteoarthritis    Chronic kidney insufficiency    Chronic lymphocytic leukemia (HCC)    Chronic obstructive pulmonary disease (HCC)    CKD (chronic kidney disease) stage 3, GFR 30-59 ml/min    CLL (chronic lymphocytic leukemia) (HCC)    Combined disorders of mitral, aortic and tricuspid valves    Overview Signed 3/20/2018  2:03 PM by Marky Boyd MD     Description: Mild aortic regurgitation, moderate to severe mitral regurgitation, severe tricuspid regurgitation  Dyslipidemia    Pulmonary emphysema (HCC)    Esophageal reflux    History of thoracotomy    History of blood transfusion    History of D&C    History of lumpectomy of right breast    History of pneumothorax    Hx of emergency  section     (spontaneous vaginal delivery)    Hypertension    Hypokalemia    Hyponatremia    Hypothyroidism    Hemoptysis    Lumbar stenosis    Pulmonary disease    RA (rheumatoid arthritis) (HCC)    Osteoarthritis    Premature atrial contraction    Pulmonary artery hypertension (HCC)    Scoliosis    Spinal stenosis    Trochanteric bursitis of both hips    Venous stasis        Past Medical History:   Diagnosis Date    Carpal tunnel syndrome     unspecified , lateraly ,   resolved 2014    Cubital tunnel syndrome on left     resolved 2014    Dyslipidemia     Ectopic heartbeats     resolved 2014    Hypertension     Malignant melanoma of skin ([address] )     resolved 2014    Premature atrial contraction     Transient ischemic attack     resolved 2014     Social History     Socioeconomic History    Marital status:   Spouse name: Not on file    Number of children: Not on file    Years of education: Not on file    Highest education level: Not on file   Occupational History    Not on file   Social Needs    Financial resource strain: Not on file    Food insecurity:     Worry: Not on file     Inability: Not on file    Transportation needs:     Medical: Not on file     Non-medical: Not on file   Tobacco Use    Smoking status: Never Smoker    Smokeless tobacco: Never Used   Substance and Sexual Activity    Alcohol use: Yes     Comment: social    Drug use: No    Sexual activity: Not on file   Lifestyle    Physical activity:     Days per week: Not on file     Minutes per session: Not on file    Stress: Not on file   Relationships    Social connections:     Talks on phone: Not on file     Gets together: Not on file     Attends Sikhism service: Not on file     Active member of club or organization: Not on file     Attends meetings of clubs or organizations: Not on file     Relationship status: Not on file    Intimate partner violence:     Fear of current or ex partner: Not on file     Emotionally abused: Not on file     Physically abused: Not on file     Forced sexual activity: Not on file   Other Topics Concern    Not on file   Social History Narrative    Not on file      Family History   Problem Relation Age of Onset    Colon cancer Mother     Hypertension Mother     Heart disease Father     Rheum arthritis Father     Cancer Family     Stroke Family     Diabetes Maternal Uncle     Diabetes Paternal Aunt      Past Surgical History:   Procedure Laterality Date    ANTERIOR RELEASE VERTEBRAL BODY W/ POSTERIOR FUSION      APPENDECTOMY      BREAST LUMPECTOMY      CATARACT EXTRACTION       SECTION      HYSTERECTOMY      LUMBAR LAMINECTOMY      NEUROPLASTY      decompression medial nerve at carpal tunnel     OOPHORECTOMY      THORACOSCOPY W/ TALC PLEURODESIS      TONSILLECTOMY         Current Outpatient Medications:     ALPRAZolam (XANAX) 0 25 mg tablet, Take 1 tablet (0 25 mg total) by mouth 2 (two) times a day as needed for anxiety, Disp: 180 tablet, Rfl: 0    ascorbic acid (VITAMIN C) 250 mg tablet, Take 1 tablet by mouth daily, Disp: , Rfl:     calcium-vitamin D (OSCAL-500) 500-400 MG-UNIT per tablet, Take by mouth, Disp: , Rfl:     CELEBREX 200 MG capsule, Take 1 capsule (200 mg total) by mouth 2 (two) times a day BRAND NECESSARY, Disp: 180 capsule, Rfl: 1    Cholecalciferol (VITAMIN D3) 2000 units capsule, Take by mouth, Disp: , Rfl:     coenzyme Q-10 100 MG capsule, Take by mouth, Disp: , Rfl:     Cranberry 400 MG CAPS, Take by mouth, Disp: , Rfl:     levothyroxine 100 mcg tablet, Take 1 tablet (100 mcg total) by mouth daily, Disp: 90 tablet, Rfl: 3    losartan (COZAAR) 50 mg tablet, TAKE ONE-HALF (1/2) TABLET DAILY, Disp: 45 tablet, Rfl: 4    montelukast (SINGULAIR) 10 mg tablet, TAKE 1 TABLET DAILY, Disp: 90 tablet, Rfl: 4    moxifloxacin (VIGAMOX) 0 5 % ophthalmic solution, Apply to eye, Disp: , Rfl:     olopatadine HCl (PATADAY) 0 2 % opth drops, Apply to eye, Disp: , Rfl:     omeprazole (PRILOSEC) 20 mg delayed release capsule, Take 20 mg by mouth, Disp: , Rfl:     PREMARIN 1 25 MG tablet, TAKE 1 TABLET DAILY, Disp: 90 tablet, Rfl: 3    pyridoxine (VITAMIN B6) 100 mg tablet, Take 50 mg by mouth daily  , Disp: , Rfl:     triamterene-hydrochlorothiazide (MAXZIDE-25) 37 5-25 mg per tablet, TAKE 1 TABLET DAILY, Disp: 90 tablet, Rfl: 1    Vitamin E 200 units TABS, Take 1 tablet by mouth daily, Disp: , Rfl:     Zinc Gluconate 15 MG TABS, Take 1 tablet by mouth daily, Disp: , Rfl:   Allergies   Allergen Reactions    Aspirin     Codeine Polt-Chlorphen Polt Er     Etanercept     Lorazepam     Morphine     Oxycodone     Penicillins     Povidone Hives     Shaky    Povidone Iodine     Prednisone        Labs:     Chemistry        Component Value Date/Time     (L) 07/21/2015 1427    K 3 3 (L) 09/24/2019 1504    K 4 1 07/21/2015 1427    CL 94 (L) 09/24/2019 1504    CL 95 (L) 07/21/2015 1427    CO2 28 09/24/2019 1504    CO2 28 5 07/21/2015 1427    BUN 29 (H) 09/24/2019 1504    BUN 25 07/21/2015 1427    CREATININE 1 21 09/24/2019 1504    CREATININE 1 11 07/21/2015 1427        Component Value Date/Time    CALCIUM 9 2 09/24/2019 1504    CALCIUM 8 9 07/21/2015 1427    ALKPHOS 57 09/24/2019 1504    ALKPHOS 51 07/21/2015 1427    AST 28 09/24/2019 1504    AST 25 07/21/2015 1427    ALT 16 09/24/2019 1504    ALT 22 07/21/2015 1427    BILITOT 0 39 07/21/2015 1427            Lab Results   Component Value Date    CHOL 235 07/21/2015    CHOL 237 04/06/2014    CHOL 239 10/29/2013     Lab Results   Component Value Date    HDL 98 (H) 09/24/2019     (H) 05/21/2019     (H) 11/11/2018     Lab Results   Component Value Date    LDLCALC 130 (H) 09/24/2019    LDLCALC 136 (H) 05/21/2019    LDLCALC 135 (H) 11/11/2018     Lab Results   Component Value Date    TRIG 83 09/24/2019    TRIG 91 05/21/2019    TRIG 54 11/11/2018     No results found for: CHOLHDL    Imaging: No results found  Review of Systems   Cardiovascular: Positive for dyspnea on exertion  Musculoskeletal: Positive for arthritis and back pain  All other systems reviewed and are negative  Vitals:    10/17/19 1433   BP: 124/72   Pulse: 84     Vitals:    10/17/19 1433   Weight: 53 kg (116 lb 13 5 oz)     Height: 4' 9" (144 8 cm)   Body mass index is 25 28 kg/m²      Physical Exam:  General:  Alert and cooperative, appears stated age  HEENT:  PERRLA, EOMI, no scleral icterus, no conjunctival pallor  Neck:  No lymphadenopathy, no thyromegaly, no carotid bruits, no elevated JVP  Heart:  Regular with ectopy, normal E6/J1, 2/6 holosystolic murmur at LLSB   Lungs:  Clear to auscultation bilaterally   Abdomen:  Soft, non-tender, positive bowel sounds, no rebound or guarding,   no organomegaly   Extremities:  1+ pitting edema  Vascular:  2+ pedal pulses  Skin:  No rashes or lesions on exposed skin  Neurologic:  Cranial nerves II-XII grossly intact without focal deficits

## 2019-10-17 ENCOUNTER — OFFICE VISIT (OUTPATIENT)
Dept: CARDIOLOGY CLINIC | Facility: HOSPITAL | Age: 84
End: 2019-10-17
Payer: MEDICARE

## 2019-10-17 VITALS
DIASTOLIC BLOOD PRESSURE: 72 MMHG | WEIGHT: 116.84 LBS | HEIGHT: 57 IN | SYSTOLIC BLOOD PRESSURE: 124 MMHG | BODY MASS INDEX: 25.21 KG/M2 | HEART RATE: 84 BPM

## 2019-10-17 DIAGNOSIS — E78.5 DYSLIPIDEMIA: ICD-10-CM

## 2019-10-17 DIAGNOSIS — I49.1 PREMATURE ATRIAL CONTRACTION: ICD-10-CM

## 2019-10-17 DIAGNOSIS — I10 BENIGN ESSENTIAL HYPERTENSION: ICD-10-CM

## 2019-10-17 DIAGNOSIS — I08.3 COMBINED DISORDERS OF MITRAL, AORTIC AND TRICUSPID VALVES: Primary | ICD-10-CM

## 2019-10-17 DIAGNOSIS — I27.21 PULMONARY ARTERY HYPERTENSION (HCC): ICD-10-CM

## 2019-10-17 PROCEDURE — 99214 OFFICE O/P EST MOD 30 MIN: CPT | Performed by: INTERNAL MEDICINE

## 2019-10-18 ENCOUNTER — TELEPHONE (OUTPATIENT)
Dept: CARDIOLOGY CLINIC | Facility: CLINIC | Age: 84
End: 2019-10-18

## 2019-10-18 NOTE — TELEPHONE ENCOUNTER
Received call from [facility]iology, re significant findings on CT of chest done today

## 2019-12-10 ENCOUNTER — APPOINTMENT (OUTPATIENT)
Dept: LAB | Facility: HOSPITAL | Age: 84
End: 2019-12-10
Payer: MEDICARE

## 2019-12-10 DIAGNOSIS — C91.10 CLL (CHRONIC LYMPHOCYTIC LEUKEMIA) (HCC): ICD-10-CM

## 2019-12-10 DIAGNOSIS — E87.6 HYPOKALEMIA: ICD-10-CM

## 2019-12-10 LAB
ANION GAP SERPL CALCULATED.3IONS-SCNC: 7 MMOL/L (ref 4–13)
BASOPHILS # BLD MANUAL: 0 THOUSAND/UL (ref 0–0.1)
BASOPHILS NFR MAR MANUAL: 0 % (ref 0–1)
BUN SERPL-MCNC: 32 MG/DL (ref 5–25)
CALCIUM SERPL-MCNC: 9.2 MG/DL (ref 8.3–10.1)
CHLORIDE SERPL-SCNC: 96 MMOL/L (ref 100–108)
CO2 SERPL-SCNC: 31 MMOL/L (ref 21–32)
CREAT SERPL-MCNC: 1.32 MG/DL (ref 0.6–1.3)
EOSINOPHIL # BLD MANUAL: 0 THOUSAND/UL (ref 0–0.4)
EOSINOPHIL NFR BLD MANUAL: 0 % (ref 0–6)
ERYTHROCYTE [DISTWIDTH] IN BLOOD BY AUTOMATED COUNT: 12.6 % (ref 11.6–15.1)
GFR SERPL CREATININE-BSD FRML MDRD: 37 ML/MIN/1.73SQ M
GLUCOSE P FAST SERPL-MCNC: 81 MG/DL (ref 65–99)
HCT VFR BLD AUTO: 38.7 % (ref 34.8–46.1)
HGB BLD-MCNC: 12.5 G/DL (ref 11.5–15.4)
LYMPHOCYTES # BLD AUTO: 13.73 THOUSAND/UL (ref 0.6–4.47)
LYMPHOCYTES # BLD AUTO: 72 % (ref 14–44)
MCH RBC QN AUTO: 30.6 PG (ref 26.8–34.3)
MCHC RBC AUTO-ENTMCNC: 32.3 G/DL (ref 31.4–37.4)
MCV RBC AUTO: 95 FL (ref 82–98)
MONOCYTES # BLD AUTO: 0.38 THOUSAND/UL (ref 0–1.22)
MONOCYTES NFR BLD: 2 % (ref 4–12)
NEUTROPHILS # BLD MANUAL: 4.96 THOUSAND/UL (ref 1.85–7.62)
NEUTS SEG NFR BLD AUTO: 26 % (ref 43–75)
NRBC BLD AUTO-RTO: 0 /100 WBCS
PLATELET # BLD AUTO: 224 THOUSANDS/UL (ref 149–390)
PLATELET BLD QL SMEAR: ADEQUATE
PMV BLD AUTO: 11.3 FL (ref 8.9–12.7)
POTASSIUM SERPL-SCNC: 3.8 MMOL/L (ref 3.5–5.3)
RBC # BLD AUTO: 4.09 MILLION/UL (ref 3.81–5.12)
RBC MORPH BLD: NORMAL
SMUDGE CELLS BLD QL SMEAR: PRESENT
SODIUM SERPL-SCNC: 134 MMOL/L (ref 136–145)
TOTAL CELLS COUNTED SPEC: 100
WBC # BLD AUTO: 19.07 THOUSAND/UL (ref 4.31–10.16)

## 2019-12-10 PROCEDURE — 85007 BL SMEAR W/DIFF WBC COUNT: CPT

## 2019-12-10 PROCEDURE — 85027 COMPLETE CBC AUTOMATED: CPT

## 2019-12-10 PROCEDURE — 36415 COLL VENOUS BLD VENIPUNCTURE: CPT

## 2019-12-10 PROCEDURE — 80048 BASIC METABOLIC PNL TOTAL CA: CPT

## 2019-12-13 ENCOUNTER — OFFICE VISIT (OUTPATIENT)
Dept: HEMATOLOGY ONCOLOGY | Facility: CLINIC | Age: 84
End: 2019-12-13
Payer: MEDICARE

## 2019-12-13 VITALS
SYSTOLIC BLOOD PRESSURE: 134 MMHG | DIASTOLIC BLOOD PRESSURE: 70 MMHG | BODY MASS INDEX: 25.89 KG/M2 | WEIGHT: 120 LBS | HEART RATE: 62 BPM | HEIGHT: 57 IN | RESPIRATION RATE: 16 BRPM | TEMPERATURE: 98.3 F

## 2019-12-13 DIAGNOSIS — C91.10 CLL (CHRONIC LYMPHOCYTIC LEUKEMIA) (HCC): Primary | ICD-10-CM

## 2019-12-13 PROCEDURE — 99213 OFFICE O/P EST LOW 20 MIN: CPT | Performed by: INTERNAL MEDICINE

## 2019-12-13 NOTE — PROGRESS NOTES
[facility] [place]  [address] [facility]  [place] [id number] [phone]  [address] [id number],[birth year], [id number]  12/13/19    Discussion:   In summary, this is an 51-year-old female history of CLL, stage 0, as outlined  Clinically she is doing well  She continues to function with minimal restriction  Recent blood work shows white count at 19,000, stable in her previously established range, 10 0-20 0  Equally important she has no palpable lymphadenopathy or organomegaly and maintains normal hemoglobin and platelet count  In short, she has no symptoms referable to her disease  We reviewed that her disease will probably continue to follow in in course  Follow-up in 6 months is arranged  I discussed the above with the patient  The patient and her daughter voiced understanding and agreement   ______________________________________________________________________    No chief complaint on file  HPI:     [facility]    3/20/2013 Initial Diagnosis     CLL (chronic lymphocytic leukemia) ([facility] )  2013- diagnosed with CLL  Mild leukocytosis  13 q- (a favorable prognostic feature) noted  Interval History:  Clinically stable  ECOG-  0 - Asymptomatic    Review of Systems   Constitutional: Negative for appetite change, diaphoresis, fatigue and fever  HENT: Negative for sinus pain  Eyes: Negative for discharge  Respiratory: Negative for cough and shortness of breath  Cardiovascular: Negative for chest pain  Gastrointestinal: Negative for abdominal pain, constipation and diarrhea  Endocrine: Negative for cold intolerance  Genitourinary: Negative for difficulty urinating and hematuria  Musculoskeletal: Negative for joint swelling  Skin: Negative for rash  Allergic/Immunologic: Negative for environmental allergies  Neurological: Negative for dizziness and headaches  Hematological: Negative for adenopathy  Psychiatric/Behavioral: Negative for agitation         Past Medical History:   Diagnosis Date    Carpal tunnel syndrome     unspecified , lateraly ,   resolved 2014    Cubital tunnel syndrome on left     resolved 2014    Dyslipidemia     Ectopic heartbeats     resolved 2014    Hypertension     Malignant melanoma of skin ([facility] )     resolved 2014    Premature atrial contraction     Transient ischemic attack     resolved 2014     Patient Active Problem List   Diagnosis    Cervical osteoarthritis    Chronic kidney insufficiency    Chronic obstructive pulmonary disease (HCC)    CKD (chronic kidney disease) stage 3, GFR 30-59 ml/min (HCC)    CLL (chronic lymphocytic leukemia) (HCC)    Combined disorders of mitral, aortic and tricuspid valves    Dyslipidemia    Pulmonary emphysema (HCC)    Esophageal reflux    History of thoracotomy    History of blood transfusion    History of D&C    History of lumpectomy of right breast    History of pneumothorax    Hx of emergency  section     (spontaneous vaginal delivery)    Benign essential hypertension    Hypokalemia    Hyponatremia    Hypothyroidism    Hemoptysis    Lumbar stenosis    Pulmonary disease    RA (rheumatoid arthritis) (HCC)    Osteoarthritis    Premature atrial contraction    Pulmonary artery hypertension (HCC)    Scoliosis    Spinal stenosis    Trochanteric bursitis of both hips    Venous stasis    Kyphosis    Lung nodules    Peripheral edema       Current Outpatient Medications:     ALPRAZolam (XANAX) 0 25 mg tablet, Take 1 tablet (0 25 mg total) by mouth 2 (two) times a day as needed for anxiety, Disp: 180 tablet, Rfl: 0    ascorbic acid (VITAMIN C) 250 mg tablet, Take 1 tablet by mouth daily, Disp: , Rfl:     calcium-vitamin D (OSCAL-500) 500-400 MG-UNIT per tablet, Take by mouth, Disp: , Rfl:     CELEBREX 200 MG capsule, Take 1 capsule (200 mg total) by mouth 2 (two) times a day BRAND NECESSARY, Disp: 180 capsule, Rfl: 1    Cholecalciferol (VITAMIN D3) 2000 units capsule, Take by mouth, Disp: , Rfl:     coenzyme Q-10 100 MG capsule, Take by mouth, Disp: , Rfl:     Cranberry 400 MG CAPS, Take by mouth, Disp: , Rfl:     levothyroxine 100 mcg tablet, Take 1 tablet (100 mcg total) by mouth daily, Disp: 90 tablet, Rfl: 3    losartan (COZAAR) 50 mg tablet, TAKE ONE-HALF (1/2) TABLET DAILY, Disp: 45 tablet, Rfl: 4    montelukast (SINGULAIR) 10 mg tablet, TAKE 1 TABLET DAILY, Disp: 90 tablet, Rfl: 4    moxifloxacin (VIGAMOX) 0 5 % ophthalmic solution, Apply to eye, Disp: , Rfl:     olopatadine HCl (PATADAY) 0 2 % opth drops, Apply to eye, Disp: , Rfl:     omeprazole (PRILOSEC) 20 mg delayed release capsule, Take 20 mg by mouth, Disp: , Rfl:     PREMARIN 1 25 MG tablet, TAKE 1 TABLET DAILY, Disp: 90 tablet, Rfl: 3    pyridoxine (VITAMIN B6) 100 mg tablet, Take 50 mg by mouth daily  , Disp: , Rfl:     triamterene-hydrochlorothiazide (MAXZIDE-25) 37 5-25 mg per tablet, TAKE 1 TABLET DAILY, Disp: 90 tablet, Rfl: 1    Vitamin E 200 units TABS, Take 1 tablet by mouth daily, Disp: , Rfl:     Zinc Gluconate 15 MG TABS, Take 1 tablet by mouth daily, Disp: , Rfl:   Allergies   Allergen Reactions    Aspirin     Codeine Polt-Chlorphen Polt Er     Etanercept     Lorazepam     Morphine     Oxycodone     Penicillins     Povidone Hives     Shaky    Povidone Iodine     Prednisone      Past Surgical History:   Procedure Laterality Date    ANTERIOR RELEASE VERTEBRAL BODY W/ POSTERIOR FUSION      APPENDECTOMY      BREAST LUMPECTOMY      CATARACT EXTRACTION       SECTION      HYSTERECTOMY      LUMBAR LAMINECTOMY      NEUROPLASTY      decompression medial nerve at carpal tunnel     OOPHORECTOMY      THORACOSCOPY W/ TALC PLEURODESIS      TONSILLECTOMY       Social History     Objective: There were no vitals filed for this visit    Physical Exam   Constitutional: She is oriented to person, place, and time  She appears well-developed  HENT:   Head: Normocephalic  Eyes: Pupils are equal, round, and reactive to light  Neck: Neck supple  Cardiovascular: Normal rate  No murmur heard  Pulmonary/Chest: No respiratory distress  She has no wheezes  She has no rales  Abdominal: Soft  She exhibits no distension  There is no tenderness  There is no rebound  Musculoskeletal: She exhibits no edema  Lymphadenopathy:     She has no cervical adenopathy  Neurological: She is alert and oriented to person, place, and time  She displays normal reflexes  Skin: Skin is warm  No rash noted  Psychiatric: She has a normal mood and affect  Thought content normal          Labs: I personally reviewed the labs and imaging pertinent to this patient care

## 2019-12-28 DIAGNOSIS — I10 ESSENTIAL HYPERTENSION: ICD-10-CM

## 2019-12-29 RX ORDER — TRIAMTERENE AND HYDROCHLOROTHIAZIDE 37.5; 25 MG/1; MG/1
TABLET ORAL
Qty: 90 TABLET | Refills: 4 | Status: SHIPPED | OUTPATIENT
Start: 2019-12-29 | End: 2021-03-02

## 2020-01-16 ENCOUNTER — TELEPHONE (OUTPATIENT)
Dept: INTERNAL MEDICINE CLINIC | Facility: CLINIC | Age: 85
End: 2020-01-16

## 2020-01-16 NOTE — TELEPHONE ENCOUNTER
Thinks she has what her son does  Runny nose, cough, and tired  She doesn't think she's had a fever, and hasn't checked it  Did discuss with Dr Rhina Del Rosario  And she stated that she can call a zpack in for her  She would like a zpack sent to [facility] [place]

## 2020-01-17 DIAGNOSIS — J06.9 UPPER RESPIRATORY TRACT INFECTION, UNSPECIFIED TYPE: Primary | ICD-10-CM

## 2020-01-17 RX ORDER — AZITHROMYCIN 250 MG/1
TABLET, FILM COATED ORAL
Qty: 6 TABLET | Refills: 0 | Status: SHIPPED | OUTPATIENT
Start: 2020-01-17 | End: 2020-01-22

## 2020-01-17 NOTE — TELEPHONE ENCOUNTER
Order an  Watch for any worsening  If it worsens over the weekend she will need to go to the urgent care or the emergency room  She should call next week to give us an update on how she is doing

## 2020-01-27 ENCOUNTER — OFFICE VISIT (OUTPATIENT)
Dept: INTERNAL MEDICINE CLINIC | Facility: CLINIC | Age: 85
End: 2020-01-27
Payer: MEDICARE

## 2020-01-27 ENCOUNTER — APPOINTMENT (OUTPATIENT)
Dept: LAB | Facility: HOSPITAL | Age: 85
End: 2020-01-27
Payer: MEDICARE

## 2020-01-27 VITALS
OXYGEN SATURATION: 97 % | HEIGHT: 57 IN | DIASTOLIC BLOOD PRESSURE: 82 MMHG | BODY MASS INDEX: 26.66 KG/M2 | WEIGHT: 123.6 LBS | SYSTOLIC BLOOD PRESSURE: 138 MMHG | TEMPERATURE: 97.7 F | HEART RATE: 64 BPM

## 2020-01-27 DIAGNOSIS — M17.11 ARTHRITIS OF RIGHT KNEE: ICD-10-CM

## 2020-01-27 DIAGNOSIS — C91.10 CLL (CHRONIC LYMPHOCYTIC LEUKEMIA) (HCC): ICD-10-CM

## 2020-01-27 DIAGNOSIS — I10 BENIGN ESSENTIAL HYPERTENSION: ICD-10-CM

## 2020-01-27 DIAGNOSIS — M06.9 RHEUMATOID ARTHRITIS INVOLVING MULTIPLE SITES, UNSPECIFIED RHEUMATOID FACTOR PRESENCE: ICD-10-CM

## 2020-01-27 DIAGNOSIS — N18.30 CKD (CHRONIC KIDNEY DISEASE) STAGE 3, GFR 30-59 ML/MIN (HCC): ICD-10-CM

## 2020-01-27 DIAGNOSIS — L30.9 ECZEMA, UNSPECIFIED TYPE: ICD-10-CM

## 2020-01-27 DIAGNOSIS — R60.9 PERIPHERAL EDEMA: ICD-10-CM

## 2020-01-27 DIAGNOSIS — J44.9 CHRONIC OBSTRUCTIVE PULMONARY DISEASE, UNSPECIFIED COPD TYPE (HCC): ICD-10-CM

## 2020-01-27 DIAGNOSIS — M47.812 CERVICAL SPONDYLOSIS WITHOUT MYELOPATHY: ICD-10-CM

## 2020-01-27 DIAGNOSIS — Z00.00 MEDICARE ANNUAL WELLNESS VISIT, SUBSEQUENT: ICD-10-CM

## 2020-01-27 DIAGNOSIS — E03.9 HYPOTHYROIDISM, UNSPECIFIED TYPE: ICD-10-CM

## 2020-01-27 DIAGNOSIS — E78.5 DYSLIPIDEMIA: ICD-10-CM

## 2020-01-27 DIAGNOSIS — I10 BENIGN ESSENTIAL HYPERTENSION: Primary | ICD-10-CM

## 2020-01-27 DIAGNOSIS — I27.21 PULMONARY ARTERY HYPERTENSION (HCC): ICD-10-CM

## 2020-01-27 PROBLEM — IMO0002 DEGENERATION OF INTERVERTEBRAL DISC: Status: ACTIVE | Noted: 2020-01-27

## 2020-01-27 PROBLEM — M47.817 LUMBOSACRAL SPONDYLOSIS WITHOUT MYELOPATHY: Status: ACTIVE | Noted: 2020-01-27

## 2020-01-27 PROBLEM — M54.50 LOW BACK PAIN: Status: ACTIVE | Noted: 2020-01-27

## 2020-01-27 PROBLEM — M19.079 ARTHRITIS OF ANKLE: Status: ACTIVE | Noted: 2020-01-27

## 2020-01-27 PROBLEM — M48.02 CERVICAL STENOSIS OF SPINAL CANAL: Status: ACTIVE | Noted: 2020-01-27

## 2020-01-27 LAB
ALBUMIN SERPL BCP-MCNC: 3.8 G/DL (ref 3.5–5)
ALP SERPL-CCNC: 67 U/L (ref 46–116)
ALT SERPL W P-5'-P-CCNC: 18 U/L (ref 12–78)
ANION GAP SERPL CALCULATED.3IONS-SCNC: 9 MMOL/L (ref 4–13)
AST SERPL W P-5'-P-CCNC: 31 U/L (ref 5–45)
BASOPHILS # BLD MANUAL: 0 THOUSAND/UL (ref 0–0.1)
BASOPHILS NFR MAR MANUAL: 0 % (ref 0–1)
BILIRUB SERPL-MCNC: 0.6 MG/DL (ref 0.2–1)
BUN SERPL-MCNC: 31 MG/DL (ref 5–25)
CALCIUM SERPL-MCNC: 9.6 MG/DL (ref 8.3–10.1)
CHLORIDE SERPL-SCNC: 93 MMOL/L (ref 100–108)
CHOLEST SERPL-MCNC: 266 MG/DL (ref 50–200)
CO2 SERPL-SCNC: 30 MMOL/L (ref 21–32)
CREAT SERPL-MCNC: 1.55 MG/DL (ref 0.6–1.3)
EOSINOPHIL # BLD MANUAL: 0.18 THOUSAND/UL (ref 0–0.4)
EOSINOPHIL NFR BLD MANUAL: 1 % (ref 0–6)
ERYTHROCYTE [DISTWIDTH] IN BLOOD BY AUTOMATED COUNT: 12.4 % (ref 11.6–15.1)
GFR SERPL CREATININE-BSD FRML MDRD: 30 ML/MIN/1.73SQ M
GLUCOSE SERPL-MCNC: 80 MG/DL (ref 65–140)
HCT VFR BLD AUTO: 37.9 % (ref 34.8–46.1)
HDLC SERPL-MCNC: 92 MG/DL
HGB BLD-MCNC: 12.2 G/DL (ref 11.5–15.4)
LDLC SERPL CALC-MCNC: 149 MG/DL (ref 0–100)
LYMPHOCYTES # BLD AUTO: 33 % (ref 14–44)
LYMPHOCYTES # BLD AUTO: 6.1 THOUSAND/UL (ref 0.6–4.47)
MCH RBC QN AUTO: 29.9 PG (ref 26.8–34.3)
MCHC RBC AUTO-ENTMCNC: 32.2 G/DL (ref 31.4–37.4)
MCV RBC AUTO: 93 FL (ref 82–98)
MONOCYTES # BLD AUTO: 1.11 THOUSAND/UL (ref 0–1.22)
MONOCYTES NFR BLD: 6 % (ref 4–12)
NEUTROPHILS # BLD MANUAL: 9.42 THOUSAND/UL (ref 1.85–7.62)
NEUTS SEG NFR BLD AUTO: 51 % (ref 43–75)
NONHDLC SERPL-MCNC: 174 MG/DL
NRBC BLD AUTO-RTO: 0 /100 WBCS
PLATELET # BLD AUTO: 243 THOUSANDS/UL (ref 149–390)
PLATELET BLD QL SMEAR: ADEQUATE
PMV BLD AUTO: 11.6 FL (ref 8.9–12.7)
POTASSIUM SERPL-SCNC: 3.4 MMOL/L (ref 3.5–5.3)
PROT SERPL-MCNC: 7.6 G/DL (ref 6.4–8.2)
RBC # BLD AUTO: 4.08 MILLION/UL (ref 3.81–5.12)
SMUDGE CELLS BLD QL SMEAR: PRESENT
SODIUM SERPL-SCNC: 132 MMOL/L (ref 136–145)
TOTAL CELLS COUNTED SPEC: 100
TRIGL SERPL-MCNC: 127 MG/DL
TSH SERPL DL<=0.05 MIU/L-ACNC: 8.21 UIU/ML (ref 0.36–3.74)
VARIANT LYMPHS # BLD AUTO: 9 %
WBC # BLD AUTO: 18.47 THOUSAND/UL (ref 4.31–10.16)

## 2020-01-27 PROCEDURE — 36415 COLL VENOUS BLD VENIPUNCTURE: CPT

## 2020-01-27 PROCEDURE — 99214 OFFICE O/P EST MOD 30 MIN: CPT | Performed by: FAMILY MEDICINE

## 2020-01-27 PROCEDURE — 84443 ASSAY THYROID STIM HORMONE: CPT

## 2020-01-27 PROCEDURE — 1123F ACP DISCUSS/DSCN MKR DOCD: CPT | Performed by: FAMILY MEDICINE

## 2020-01-27 PROCEDURE — 85007 BL SMEAR W/DIFF WBC COUNT: CPT

## 2020-01-27 PROCEDURE — 80061 LIPID PANEL: CPT

## 2020-01-27 PROCEDURE — 85027 COMPLETE CBC AUTOMATED: CPT

## 2020-01-27 PROCEDURE — 80053 COMPREHEN METABOLIC PANEL: CPT

## 2020-01-27 PROCEDURE — G0439 PPPS, SUBSEQ VISIT: HCPCS | Performed by: FAMILY MEDICINE

## 2020-01-27 NOTE — PATIENT INSTRUCTIONS
Medicare Preventive Visit Patient Instructions  Thank you for completing your Welcome to Medicare Visit or Medicare Annual Wellness Visit today  Your next wellness visit will be due in one year (1/27/2021)  The screening/preventive services that you may require over the next 5-10 years are detailed below  Some tests may not apply to you based off risk factors and/or age  Screening tests ordered at today's visit but not completed yet may show as past due  Also, please note that scanned in results may not display below  Preventive Screenings:  Service Recommendations Previous Testing/Comments   Colorectal Cancer Screening  * Colonoscopy    * Fecal Occult Blood Test (FOBT)/Fecal Immunochemical Test (FIT)  * Fecal DNA/Cologuard Test  * Flexible Sigmoidoscopy Age: 54-65 years old   Colonoscopy: every 10 years (may be performed more frequently if at higher risk)  OR  FOBT/FIT: every 1 year  OR  Cologuard: every 3 years  OR  Sigmoidoscopy: every 5 years  Screening may be recommended earlier than age 48 if at higher risk for colorectal cancer  Also, an individualized decision between you and your healthcare provider will decide whether screening between the ages of 74-80 would be appropriate  Colonoscopy: 07/12/1999  FOBT/FIT: Not on file  Cologuard: Not on file  Sigmoidoscopy: Not on file    Screening Not Indicated     Breast Cancer Screening Age: 36 years old  Frequency: every 1-2 years  Not required if history of left and right mastectomy Mammogram: 02/23/2012       Cervical Cancer Screening Between the ages of 21-29, pap smear recommended once every 3 years  Between the ages of 33-67, can perform pap smear with HPV co-testing every 5 years     Recommendations may differ for women with a history of total hysterectomy, cervical cancer, or abnormal pap smears in past  Pap Smear: Not on file    Screening Not Indicated   Hepatitis C Screening Once for adults born between 1945 and 1965  More frequently in patients at high risk for Hepatitis C Hep C Antibody: Not on file       Diabetes Screening 1-2 times per year if you're at risk for diabetes or have pre-diabetes Fasting glucose: 81 mg/dL   A1C: No results in last 5 years    Screening Current   Cholesterol Screening Once every 5 years if you don't have a lipid disorder  May order more often based on risk factors  Lipid panel: 09/24/2019    Screening Current     Other Preventive Screenings Covered by Medicare:  1  Abdominal Aortic Aneurysm (AAA) Screening: covered once if your at risk  You're considered to be at risk if you have a family history of AAA  2  Lung Cancer Screening: covers low dose CT scan once per year if you meet all of the following conditions: (1) Age 50-69; (2) No signs or symptoms of lung cancer; (3) Current smoker or have quit smoking within the last 15 years; (4) You have a tobacco smoking history of at least 30 pack years (packs per day multiplied by number of years you smoked); (5) You get a written order from a healthcare provider  3  Glaucoma Screening: covered annually if you're considered high risk: (1) You have diabetes OR (2) Family history of glaucoma OR (3)  aged 48 and older OR (3)  American aged 72 and older  3  Osteoporosis Screening: covered every 2 years if you meet one of the following conditions: (1) You're estrogen deficient and at risk for osteoporosis based off medical history and other findings; (2) Have a vertebral abnormality; (3) On glucocorticoid therapy for more than 3 months; (4) Have primary hyperparathyroidism; (5) On osteoporosis medications and need to assess response to drug therapy  · Last bone density test (DXA Scan): Not on file  5  HIV Screening: covered annually if you're between the age of 12-76  Also covered annually if you are younger than 13 and older than 72 with risk factors for HIV infection   For pregnant patients, it is covered up to 3 times per pregnancy  Immunizations:  Immunization Recommendations   Influenza Vaccine Annual influenza vaccination during flu season is recommended for all persons aged >= 6 months who do not have contraindications   Pneumococcal Vaccine (Prevnar and Pneumovax)  * Prevnar = PCV13  * Pneumovax = PPSV23   Adults 25-60 years old: 1-3 doses may be recommended based on certain risk factors  Adults 72 years old: Prevnar (PCV13) vaccine recommended followed by Pneumovax (PPSV23) vaccine  If already received PPSV23 since turning 65, then PCV13 recommended at least one year after PPSV23 dose  Hepatitis B Vaccine 3 dose series if at intermediate or high risk (ex: diabetes, end stage renal disease, liver disease)   Tetanus (Td) Vaccine - COST NOT COVERED BY MEDICARE PART B Following completion of primary series, a booster dose should be given every 10 years to maintain immunity against tetanus  Td may also be given as tetanus wound prophylaxis  Tdap Vaccine - COST NOT COVERED BY MEDICARE PART B Recommended at least once for all adults  For pregnant patients, recommended with each pregnancy  Shingles Vaccine (Shingrix) - COST NOT COVERED BY MEDICARE PART B  2 shot series recommended in those aged 48 and above     Health Maintenance Due:  There are no preventive care reminders to display for this patient  Immunizations Due:  There are no preventive care reminders to display for this patient  Advance Directives   What are advance directives? Advance directives are legal documents that state your wishes and plans for medical care  These plans are made ahead of time in case you lose your ability to make decisions for yourself  Advance directives can apply to any medical decision, such as the treatments you want, and if you want to donate organs  What are the types of advance directives? There are many types of advance directives, and each state has rules about how to use them   You may choose a combination of any of the following:  · Living will: This is a written record of the treatment you want  You can also choose which treatments you do not want, which to limit, and which to stop at a certain time  This includes surgery, medicine, IV fluid, and tube feedings  · Durable power of  for healthcare [facility]): This is a written record that states who you want to make healthcare choices for you when you are unable to make them for yourself  This person, called a proxy, is usually a family member or a friend  You may choose more than 1 proxy  · Do not resuscitate (DNR) order:  A DNR order is used in case your heart stops beating or you stop breathing  It is a request not to have certain forms of treatment, such as CPR  A DNR order may be included in other types of advance directives  · Medical directive: This covers the care that you want if you are in a coma, near death, or unable to make decisions for yourself  You can list the treatments you want for each condition  Treatment may include pain medicine, surgery, blood transfusions, dialysis, IV or tube feedings, and a ventilator (breathing machine)  · Values history: This document has questions about your views, beliefs, and how you feel and think about life  This information can help others choose the care that you would choose  Why are advance directives important? An advance directive helps you control your care  Although spoken wishes may be used, it is better to have your wishes written down  Spoken wishes can be misunderstood, or not followed  Treatments may be given even if you do not want them  An advance directive may make it easier for your family to make difficult choices about your care  Weight Management   Why it is important to manage your weight:  Being overweight increases your risk of health conditions such as heart disease, high blood pressure, type 2 diabetes, and certain types of cancer   It can also increase your risk for osteoarthritis, sleep apnea, and other respiratory problems  Aim for a slow, steady weight loss  Even a small amount of weight loss can lower your risk of health problems  How to lose weight safely:  A safe and healthy way to lose weight is to eat fewer calories and get regular exercise  You can lose up about 1 pound a week by decreasing the number of calories you eat by 500 calories each day  Healthy meal plan for weight management:  A healthy meal plan includes a variety of foods, contains fewer calories, and helps you stay healthy  A healthy meal plan includes the following:  · Eat whole-grain foods more often  A healthy meal plan should contain fiber  Fiber is the part of grains, fruits, and vegetables that is not broken down by your body  Whole-grain foods are healthy and provide extra fiber in your diet  Some examples of whole-grain foods are whole-wheat breads and pastas, oatmeal, brown rice, and bulgur  · Eat a variety of vegetables every day  Include dark, leafy greens such as spinach, kale, padmini greens, and mustard greens  Eat yellow and orange vegetables such as carrots, sweet potatoes, and winter squash  · Eat a variety of fruits every day  Choose fresh or canned fruit (canned in its own juice or light syrup) instead of juice  Fruit juice has very little or no fiber  · Eat low-fat dairy foods  Drink fat-free (skim) milk or 1% milk  Eat fat-free yogurt and low-fat cottage cheese  Try low-fat cheeses such as mozzarella and other reduced-fat cheeses  · Choose meat and other protein foods that are low in fat  Choose beans or other legumes such as split peas or lentils  Choose fish, skinless poultry (chicken or turkey), or lean cuts of red meat (beef or pork)  Before you cook meat or poultry, cut off any visible fat  · Use less fat and oil  Try baking foods instead of frying them  Add less fat, such as margarine, sour cream, regular salad dressing and mayonnaise to foods  Eat fewer high-fat foods   Some examples of high-fat foods include french fries, doughnuts, ice cream, and cakes  · Eat fewer sweets  Limit foods and drinks that are high in sugar  This includes candy, cookies, regular soda, and sweetened drinks  Exercise:  Exercise at least 30 minutes per day on most days of the week  Some examples of exercise include walking, biking, dancing, and swimming  You can also fit in more physical activity by taking the stairs instead of the elevator or parking farther away from stores  Ask your healthcare provider about the best exercise plan for you  © Copyright [employer] 2018 Information is for End User's use only and may not be sold, redistributed or otherwise used for commercial purposes  All illustrations and images included in CareNotes® are the copyrighted property of [employer]  or [facility] Preventive Visit Patient Instructions  Thank you for completing your Welcome to Medicare Visit or Medicare Annual Wellness Visit today  Your next wellness visit will be due in one year (1/27/2021)  The screening/preventive services that you may require over the next 5-10 years are detailed below  Some tests may not apply to you based off risk factors and/or age  Screening tests ordered at today's visit but not completed yet may show as past due  Also, please note that scanned in results may not display below  Preventive Screenings:  Service Recommendations Previous Testing/Comments   Colorectal Cancer Screening  * Colonoscopy    * Fecal Occult Blood Test (FOBT)/Fecal Immunochemical Test (FIT)  * Fecal DNA/Cologuard Test  * Flexible Sigmoidoscopy Age: 54-65 years old   Colonoscopy: every 10 years (may be performed more frequently if at higher risk)  OR  FOBT/FIT: every 1 year  OR  Cologuard: every 3 years  OR  Sigmoidoscopy: every 5 years  Screening may be recommended earlier than age 48 if at higher risk for colorectal cancer   Also, an individualized decision between you and your healthcare provider will decide whether screening between the ages of 74-80 would be appropriate  Colonoscopy: 07/12/1999  FOBT/FIT: Not on file  Cologuard: Not on file  Sigmoidoscopy: Not on file    Screening Not Indicated     Breast Cancer Screening Age: 36 years old  Frequency: every 1-2 years  Not required if history of left and right mastectomy Mammogram: 02/23/2012       Cervical Cancer Screening Between the ages of 21-29, pap smear recommended once every 3 years  Between the ages of 33-67, can perform pap smear with HPV co-testing every 5 years  Recommendations may differ for women with a history of total hysterectomy, cervical cancer, or abnormal pap smears in past  Pap Smear: Not on file    Screening Not Indicated   Hepatitis C Screening Once for adults born between 1945 and 1965  More frequently in patients at high risk for Hepatitis C Hep C Antibody: Not on file       Diabetes Screening 1-2 times per year if you're at risk for diabetes or have pre-diabetes Fasting glucose: 81 mg/dL   A1C: No results in last 5 years    Screening Current   Cholesterol Screening Once every 5 years if you don't have a lipid disorder  May order more often based on risk factors  Lipid panel: 09/24/2019    Screening Current     Other Preventive Screenings Covered by Medicare:  6  Abdominal Aortic Aneurysm (AAA) Screening: covered once if your at risk  You're considered to be at risk if you have a family history of AAA  7  Lung Cancer Screening: covers low dose CT scan once per year if you meet all of the following conditions: (1) Age 50-69; (2) No signs or symptoms of lung cancer; (3) Current smoker or have quit smoking within the last 15 years; (4) You have a tobacco smoking history of at least 30 pack years (packs per day multiplied by number of years you smoked); (5) You get a written order from a healthcare provider    8  Glaucoma Screening: covered annually if you're considered high risk: (1) You have diabetes OR (2) Family history of glaucoma OR (3)  aged 48 and older OR (3)  American aged 72 and older  5  Osteoporosis Screening: covered every 2 years if you meet one of the following conditions: (1) You're estrogen deficient and at risk for osteoporosis based off medical history and other findings; (2) Have a vertebral abnormality; (3) On glucocorticoid therapy for more than 3 months; (4) Have primary hyperparathyroidism; (5) On osteoporosis medications and need to assess response to drug therapy  · Last bone density test (DXA Scan): Not on file  10  HIV Screening: covered annually if you're between the age of 12-76  Also covered annually if you are younger than 13 and older than 72 with risk factors for HIV infection  For pregnant patients, it is covered up to 3 times per pregnancy  Immunizations:  Immunization Recommendations   Influenza Vaccine Annual influenza vaccination during flu season is recommended for all persons aged >= 6 months who do not have contraindications   Pneumococcal Vaccine (Prevnar and Pneumovax)  * Prevnar = PCV13  * Pneumovax = PPSV23   Adults 25-60 years old: 1-3 doses may be recommended based on certain risk factors  Adults 72 years old: Prevnar (PCV13) vaccine recommended followed by Pneumovax (PPSV23) vaccine  If already received PPSV23 since turning 65, then PCV13 recommended at least one year after PPSV23 dose  Hepatitis B Vaccine 3 dose series if at intermediate or high risk (ex: diabetes, end stage renal disease, liver disease)   Tetanus (Td) Vaccine - COST NOT COVERED BY MEDICARE PART B Following completion of primary series, a booster dose should be given every 10 years to maintain immunity against tetanus  Td may also be given as tetanus wound prophylaxis  Tdap Vaccine - COST NOT COVERED BY MEDICARE PART B Recommended at least once for all adults  For pregnant patients, recommended with each pregnancy     Shingles Vaccine (Shingrix) - COST NOT COVERED BY MEDICARE PART B  2 shot series recommended in those aged 48 and above     Health Maintenance Due:  There are no preventive care reminders to display for this patient  Immunizations Due:  There are no preventive care reminders to display for this patient  Advance Directives   What are advance directives? Advance directives are legal documents that state your wishes and plans for medical care  These plans are made ahead of time in case you lose your ability to make decisions for yourself  Advance directives can apply to any medical decision, such as the treatments you want, and if you want to donate organs  What are the types of advance directives? There are many types of advance directives, and each state has rules about how to use them  You may choose a combination of any of the following:  · Living will: This is a written record of the treatment you want  You can also choose which treatments you do not want, which to limit, and which to stop at a certain time  This includes surgery, medicine, IV fluid, and tube feedings  · Durable power of  for healthcare [facility]): This is a written record that states who you want to make healthcare choices for you when you are unable to make them for yourself  This person, called a proxy, is usually a family member or a friend  You may choose more than 1 proxy  · Do not resuscitate (DNR) order:  A DNR order is used in case your heart stops beating or you stop breathing  It is a request not to have certain forms of treatment, such as CPR  A DNR order may be included in other types of advance directives  · Medical directive: This covers the care that you want if you are in a coma, near death, or unable to make decisions for yourself  You can list the treatments you want for each condition  Treatment may include pain medicine, surgery, blood transfusions, dialysis, IV or tube feedings, and a ventilator (breathing machine)  · Values history:   This document has questions about your views, beliefs, and how you feel and think about life  This information can help others choose the care that you would choose  Why are advance directives important? An advance directive helps you control your care  Although spoken wishes may be used, it is better to have your wishes written down  Spoken wishes can be misunderstood, or not followed  Treatments may be given even if you do not want them  An advance directive may make it easier for your family to make difficult choices about your care  Weight Management   Why it is important to manage your weight:  Being overweight increases your risk of health conditions such as heart disease, high blood pressure, type 2 diabetes, and certain types of cancer  It can also increase your risk for osteoarthritis, sleep apnea, and other respiratory problems  Aim for a slow, steady weight loss  Even a small amount of weight loss can lower your risk of health problems  How to lose weight safely:  A safe and healthy way to lose weight is to eat fewer calories and get regular exercise  You can lose up about 1 pound a week by decreasing the number of calories you eat by 500 calories each day  Healthy meal plan for weight management:  A healthy meal plan includes a variety of foods, contains fewer calories, and helps you stay healthy  A healthy meal plan includes the following:  · Eat whole-grain foods more often  A healthy meal plan should contain fiber  Fiber is the part of grains, fruits, and vegetables that is not broken down by your body  Whole-grain foods are healthy and provide extra fiber in your diet  Some examples of whole-grain foods are whole-wheat breads and pastas, oatmeal, brown rice, and bulgur  · Eat a variety of vegetables every day  Include dark, leafy greens such as spinach, kale, padmini greens, and mustard greens  Eat yellow and orange vegetables such as carrots, sweet potatoes, and winter squash  · Eat a variety of fruits every day  Choose fresh or canned fruit (canned in its own juice or light syrup) instead of juice  Fruit juice has very little or no fiber  · Eat low-fat dairy foods  Drink fat-free (skim) milk or 1% milk  Eat fat-free yogurt and low-fat cottage cheese  Try low-fat cheeses such as mozzarella and other reduced-fat cheeses  · Choose meat and other protein foods that are low in fat  Choose beans or other legumes such as split peas or lentils  Choose fish, skinless poultry (chicken or turkey), or lean cuts of red meat (beef or pork)  Before you cook meat or poultry, cut off any visible fat  · Use less fat and oil  Try baking foods instead of frying them  Add less fat, such as margarine, sour cream, regular salad dressing and mayonnaise to foods  Eat fewer high-fat foods  Some examples of high-fat foods include french fries, doughnuts, ice cream, and cakes  · Eat fewer sweets  Limit foods and drinks that are high in sugar  This includes candy, cookies, regular soda, and sweetened drinks  Exercise:  Exercise at least 30 minutes per day on most days of the week  Some examples of exercise include walking, biking, dancing, and swimming  You can also fit in more physical activity by taking the stairs instead of the elevator or parking farther away from stores  Ask your healthcare provider about the best exercise plan for you  © Copyright [employer] 2018 Information is for End User's use only and may not be sold, redistributed or otherwise used for commercial purposes   All illustrations and images included in CareNotes® are the copyrighted property of [employer]  or [address] [place] [address]

## 2020-01-27 NOTE — PROGRESS NOTES
Assessment and Plan:     Problem List Items Addressed This Visit        Endocrine    Hypothyroidism     She did not have her laboratory testing done prior to this visit  She plans on having it done immediately after the visit  Discussed with her the importance of trying to have the laboratory testing done prior to the visit so this can be discussed at the time of the visit and changes can be made  For now will continue the same dose of levothyroxine 100 mcg on a daily basis  Will adjust this if needed based on the upcoming laboratory testing  Signs and symptoms of hypothyroidism and hyperthyroidism reviewed with her  Relevant Orders    CBC and differential (Completed)    TSH, 3rd generation (Completed)       Respiratory    Chronic obstructive pulmonary disease ([facility] [address] )     She had been diagnosed in the distant past with COPD but this is uncertain whether it is COPD verses lung issues related to her underlying rheumatologic issues  Watch for any increasing shortness of breath  Continue with the Singulair which has worked well for her  Consider pulmonary function tests in the future  She had followed up with Pulmonary but not recently  Relevant Orders    CBC and differential (Completed)       Cardiovascular and Mediastinum    Benign essential hypertension - Primary     Blood pressure relatively well controlled  Stay well hydrated  Continue with the losartan  Continue with the triamterene/hydrochlorothiazide  She will get repeat laboratory testing done after the visit today  Relevant Orders    CBC and differential (Completed)    Comprehensive metabolic panel (Completed)    Pulmonary artery hypertension ([facility] [address] )     Continue follow-up with Cardiology  Continue with echocardiograms as per their recommendations  Pressures have been stable or actually improved slightly           Relevant Orders    CBC and differential (Completed)       Musculoskeletal and Integument    Cervical spondylosis without myelopathy     Continues with the chronic neck issues  Recommended she follow up with Orthopedics  Discussed with her following through on the MRI as per their direction  Relevant Orders    CBC and differential (Completed)    RA (rheumatoid arthritis) ([facility])     Persistent joint issues likely related to not only her rheumatoid arthritis but some underlying osteoarthritis  Risks and benefits of the Celebrex discussed  She understands the potential issues with edema and effects on the kidneys  Recommended following up with rheumatology but she declines at present  Relevant Orders    CBC and differential (Completed)    Arthritis of right knee     Arthritis symptoms into the knees have worsened right greater than left  Significant degenerative changes on exam   Will refer her to Orthopedics for further investigation and potential treatment  Relevant Orders    Ambulatory referral to Orthopedic Surgery       Genitourinary    CKD (chronic kidney disease) stage 3, GFR 30-59 ml/min ([facility])     Creatinine has been stable  She will have repeat laboratory testing done today  Try to avoid daily use of the Celebrex  Stay adequately hydrated  Relevant Orders    CBC and differential (Completed)    Comprehensive metabolic panel (Completed)       Other    CLL (chronic lymphocytic leukemia) ([facility] [address] )     Continue follow-up with Hematology on a regular basis  She has been relatively stable from this standpoint  Relevant Orders    CBC and differential (Completed)    Dyslipidemia     Cholesterol has been persistently elevated  She declines statin use  Watch diet  Increase fiber in diet  Continue to be as active as possible but be careful to avoid falls  Relevant Orders    CBC and differential (Completed)    Comprehensive metabolic panel (Completed)    Lipid panel (Completed)    TSH, 3rd generation (Completed)    Peripheral edema     Discussed her peripheral edema with her  This seems to wax and wane  Discussed use of compression stockings  Discussed following up with Podiatry because of some concerns regarding her feet  Elevate legs as much as possible  Discussed with her that antibiotic use should not affect her peripheral edema although she feels that it is closely related with the edema decreasing when she is on antibiotics  Discussed using steroid cream to the lower extremities especially because of some dermatitis changes  Prescription will be sent  Relevant Medications    betamethasone valerate (VALISONE) 0 1 % cream      Other Visit Diagnoses     Medicare annual wellness visit, subsequent        Eczema, unspecified type        Relevant Medications    betamethasone valerate (VALISONE) 0 1 % cream        BMI Counseling: Body mass index is 26 75 kg/m²  The BMI is above normal  Nutrition recommendations include encouraging healthy choices of fruits and vegetables, consuming healthier snacks, moderation in carbohydrate intake and reducing intake of cholesterol  Exercise recommendations include exercising 3-5 times per week  Preventive health issues were discussed with patient, and age appropriate screening tests were ordered as noted in patient's After Visit Summary  Personalized health advice and appropriate referrals for health education or preventive services given if needed, as noted in patient's After Visit Summary       History of Present Illness:     Patient presents for Medicare Annual Wellness visit    Patient Care Team:  Vonnie Richardson MD as PCP - General  DO Deborah Foreman, Stefani Pro MD     Problem List:     Patient Active Problem List   Diagnosis    Cervical spondylosis without myelopathy    Chronic kidney insufficiency    Chronic obstructive pulmonary disease ([facility] [address] )    CKD (chronic kidney disease) stage 3, GFR 30-59 ml/min [facility]    CLL (chronic lymphocytic leukemia) ([facility] [address] )    Combined disorders of mitral, aortic and tricuspid valves    Dyslipidemia    Pulmonary emphysema (HCC)    Esophageal reflux    History of thoracotomy    History of blood transfusion    History of D&C    History of lumpectomy of right breast    History of pneumothorax    Hx of emergency  section     (spontaneous vaginal delivery)    Benign essential hypertension    Hypokalemia    Hyponatremia    Hypothyroidism    Hemoptysis    Lumbar stenosis    Pulmonary disease    RA (rheumatoid arthritis) (HCC)    Osteoarthritis    Premature atrial contraction    Pulmonary artery hypertension (HCC)    Scoliosis (and kyphoscoliosis), idiopathic    Spinal stenosis    Trochanteric bursitis of both hips    Venous stasis    Kyphosis    Lung nodules    Peripheral edema    Low back pain    Lumbosacral spondylosis without myelopathy    Degeneration of intervertebral disc    Cervical stenosis of spinal canal    Arthritis of ankle    Arthritis of right knee      Past Medical and Surgical History:     Past Medical History:   Diagnosis Date    Carpal tunnel syndrome     unspecified , lateraly ,   resolved 2014    Cubital tunnel syndrome on left     resolved 2014    Dyslipidemia     Ectopic heartbeats     resolved 2014    Hypertension     Malignant melanoma of skin (HCC)     resolved 2014    Premature atrial contraction     Transient ischemic attack     resolved 2014     Past Surgical History:   Procedure Laterality Date    ANTERIOR RELEASE VERTEBRAL BODY W/ POSTERIOR FUSION      APPENDECTOMY      BREAST LUMPECTOMY      CATARACT EXTRACTION       SECTION      HYSTERECTOMY      LUMBAR LAMINECTOMY      NEUROPLASTY      decompression medial nerve at carpal tunnel     OOPHORECTOMY      THORACOSCOPY W/ TALC PLEURODESIS      TONSILLECTOMY        Family History:     Family History   Problem Relation Age of Onset    Colon cancer Mother     Hypertension Mother     Heart disease Father    Alex Magallanes Rheum arthritis Father     Cancer Family     Stroke Family     Diabetes Maternal Uncle     Diabetes Paternal Aunt       Social History:     Social History     Socioeconomic History    Marital status:       Spouse name: None    Number of children: None    Years of education: None    Highest education level: None   Occupational History    None   Social Needs    Financial resource strain: None    Food insecurity:     Worry: None     Inability: None    Transportation needs:     Medical: None     Non-medical: None   Tobacco Use    Smoking status: Never Smoker    Smokeless tobacco: Never Used   Substance and Sexual Activity    Alcohol use: Yes     Comment: social    Drug use: No    Sexual activity: None   Lifestyle    Physical activity:     Days per week: None     Minutes per session: None    Stress: None   Relationships    Social connections:     Talks on phone: None     Gets together: None     Attends Gnosticist service: None     Active member of club or organization: None     Attends meetings of clubs or organizations: None     Relationship status: None    Intimate partner violence:     Fear of current or ex partner: None     Emotionally abused: None     Physically abused: None     Forced sexual activity: None   Other Topics Concern    None   Social History Narrative    None       Medications and Allergies:     Current Outpatient Medications   Medication Sig Dispense Refill    ALPRAZolam (XANAX) 0 25 mg tablet Take 1 tablet (0 25 mg total) by mouth 2 (two) times a day as needed for anxiety 180 tablet 0    ascorbic acid (VITAMIN C) 250 mg tablet Take 1 tablet by mouth daily      calcium-vitamin D (OSCAL-500) 500-400 MG-UNIT per tablet Take by mouth      CELEBREX 200 MG capsule Take 1 capsule (200 mg total) by mouth 2 (two) times a day BRAND NECESSARY 180 capsule 1    Cholecalciferol (VITAMIN D3) 2000 units capsule Take by mouth      coenzyme Q-10 100 MG capsule Take by mouth      Cranberry 400 MG CAPS Take by mouth      levothyroxine 100 mcg tablet Take 1 tablet (100 mcg total) by mouth daily 90 tablet 3    losartan (COZAAR) 50 mg tablet TAKE ONE-HALF (1/2) TABLET DAILY 45 tablet 4    montelukast (SINGULAIR) 10 mg tablet TAKE 1 TABLET DAILY 90 tablet 4    moxifloxacin (VIGAMOX) 0 5 % ophthalmic solution Apply to eye      olopatadine HCl (PATADAY) 0 2 % opth drops Apply to eye      omeprazole (PRILOSEC) 20 mg delayed release capsule Take 20 mg by mouth      PREMARIN 1 25 MG tablet TAKE 1 TABLET DAILY 90 tablet 3    pyridoxine (VITAMIN B6) 100 mg tablet Take 50 mg by mouth daily        triamterene-hydrochlorothiazide (MAXZIDE-25) 37 5-25 mg per tablet TAKE 1 TABLET DAILY 90 tablet 4    Vitamin E 200 units TABS Take 1 tablet by mouth daily      Zinc Gluconate 15 MG TABS Take 1 tablet by mouth daily      betamethasone valerate (VALISONE) 0 1 % cream Apply topically 2 (two) times a day 60 g 1     No current facility-administered medications for this visit  Allergies   Allergen Reactions    Aspirin     Codeine Polt-Chlorphen Polt Er     Etanercept     Lorazepam     Morphine     Oxycodone     Penicillins     Povidone Hives     Shaky    Povidone Iodine     Prednisone       Immunizations:     Immunization History   Administered Date(s) Administered    INFLUENZA 10/31/2016    Influenza Split High Dose Preservative Free IM 10/31/2016    Pneumococcal Conjugate 13-Valent 10/31/2016    Pneumococcal Polysaccharide PPV23 10/31/2016    Tdap 10/31/2016      Health Maintenance: There are no preventive care reminders to display for this patient  There are no preventive care reminders to display for this patient     Medicare Health Risk Assessment:     /82 (BP Location: Right arm, Patient Position: Sitting, Cuff Size: Standard)   Pulse 64   Temp 97 7 °F (36 5 °C) (Temporal)   Ht 4' 9" (1 448 m)   Wt 56 1 kg (123 lb 9 6 oz)   SpO2 97%   BMI 26 75 kg/m²      Missael Chandra is here for her Subsequent Wellness visit  Last Medicare Wellness visit information reviewed, patient interviewed and updates made to the record today  Health Risk Assessment:   Patient rates overall health as good  Patient feels that their physical health rating is slightly worse  Eyesight was rated as same  Hearing was rated as same  Patient feels that their emotional and mental health rating is same  Pain experienced in the last 7 days has been a lot  Patient's pain rating has been 10/10  Patient states that she has experienced no weight loss or gain in last 6 months  Depression Screening:   PHQ-2 Score: 0      Fall Risk Screening: In the past year, patient has experienced: no history of falling in past year      Urinary Incontinence Screening:   Patient has not leaked urine accidently in the last six months  Home Safety:  Patient has trouble with stairs inside or outside of their home  Patient has working smoke alarms and has working carbon monoxide detector  Home safety hazards include: none  Nutrition:   Current diet is Regular  Medications:   Patient is currently taking over-the-counter supplements  OTC medications include: see medication list  Patient is able to manage medications  Activities of Daily Living (ADLs)/Instrumental Activities of Daily Living (IADLs):   Walk and transfer into and out of bed and chair?: Yes  Dress and groom yourself?: Yes    Bathe or shower yourself?: Yes    Feed yourself?  Yes  Do your laundry/housekeeping?: Yes  Manage your money, pay your bills and track your expenses?: Yes  Make your own meals?: Yes    Do your own shopping?: Yes    Previous Hospitalizations:   Any hospitalizations or ED visits within the last 12 months?: No      Advance Care Planning:   Living will: No    Durable POA for healthcare: No    Advanced directive: No    Five wishes given: Yes      Cognitive Screening:   Provider or family/friend/caregiver concerned regarding cognition?: No    PREVENTIVE SCREENINGS      Cardiovascular Screening:    General: Screening Current      Diabetes Screening:     General: Screening Current      Colorectal Cancer Screening:     General: Screening Not Indicated and Patient Declines      Breast Cancer Screening:     General: Patient Declines      Cervical Cancer Screening:    General: Screening Not Indicated      Osteoporosis Screening:    General: Patient Declines      Abdominal Aortic Aneurysm (AAA) Screening:        General: Screening Not Indicated      Lung Cancer Screening:     General: Screening Current      Hepatitis C Screening:    General: Screening Not Indicated      Jean-Pierre Cameron MD

## 2020-01-28 NOTE — ASSESSMENT & PLAN NOTE
Creatinine has been stable  She will have repeat laboratory testing done today  Try to avoid daily use of the Celebrex  Stay adequately hydrated

## 2020-01-28 NOTE — ASSESSMENT & PLAN NOTE
Cholesterol has been persistently elevated  She declines statin use  Watch diet  Increase fiber in diet  Continue to be as active as possible but be careful to avoid falls

## 2020-01-28 NOTE — ASSESSMENT & PLAN NOTE
Continue follow-up with Cardiology  Continue with echocardiograms as per their recommendations  Pressures have been stable or actually improved slightly

## 2020-01-28 NOTE — PROGRESS NOTES
Assessment/Plan:    Hypothyroidism  She did not have her laboratory testing done prior to this visit  She plans on having it done immediately after the visit  Discussed with her the importance of trying to have the laboratory testing done prior to the visit so this can be discussed at the time of the visit and changes can be made  For now will continue the same dose of levothyroxine 100 mcg on a daily basis  Will adjust this if needed based on the upcoming laboratory testing  Signs and symptoms of hypothyroidism and hyperthyroidism reviewed with her  Chronic obstructive pulmonary disease (HCC)  She had been diagnosed in the distant past with COPD but this is uncertain whether it is COPD verses lung issues related to her underlying rheumatologic issues  Watch for any increasing shortness of breath  Continue with the Singulair which has worked well for her  Consider pulmonary function tests in the future  She had followed up with Pulmonary but not recently  Benign essential hypertension  Blood pressure relatively well controlled  Stay well hydrated  Continue with the losartan  Continue with the triamterene/hydrochlorothiazide  She will get repeat laboratory testing done after the visit today  Pulmonary artery hypertension ([address] )  Continue follow-up with Cardiology  Continue with echocardiograms as per their recommendations  Pressures have been stable or actually improved slightly  Arthritis of right knee  Arthritis symptoms into the knees have worsened right greater than left  Significant degenerative changes on exam   Will refer her to Orthopedics for further investigation and potential treatment  Cervical spondylosis without myelopathy  Continues with the chronic neck issues  Recommended she follow up with Orthopedics  Discussed with her following through on the MRI as per their direction      RA (rheumatoid arthritis) ([facility])  Persistent joint issues likely related to not only her rheumatoid arthritis but some underlying osteoarthritis  Risks and benefits of the Celebrex discussed  She understands the potential issues with edema and effects on the kidneys  Recommended following up with rheumatology but she declines at present  CKD (chronic kidney disease) stage 3, GFR 30-59 ml/min  Creatinine has been stable  She will have repeat laboratory testing done today  Try to avoid daily use of the Celebrex  Stay adequately hydrated  CLL (chronic lymphocytic leukemia) ([facility] [address] )  Continue follow-up with Hematology on a regular basis  She has been relatively stable from this standpoint  Dyslipidemia  Cholesterol has been persistently elevated  She declines statin use  Watch diet  Increase fiber in diet  Continue to be as active as possible but be careful to avoid falls  Peripheral edema  Discussed her peripheral edema with her  This seems to wax and wane  Discussed use of compression stockings  Discussed following up with Podiatry because of some concerns regarding her feet  Elevate legs as much as possible  Discussed with her that antibiotic use should not affect her peripheral edema although she feels that it is closely related with the edema decreasing when she is on antibiotics  Discussed using steroid cream to the lower extremities especially because of some dermatitis changes  Prescription will be sent  Diagnoses and all orders for this visit:    Benign essential hypertension  -     CBC and differential; Future  -     Comprehensive metabolic panel; Future    Hypothyroidism, unspecified type  -     CBC and differential; Future  -     TSH, 3rd generation;  Future    Pulmonary artery hypertension (HCC)  -     CBC and differential; Future    Cervical spondylosis without myelopathy  -     CBC and differential; Future    Rheumatoid arthritis involving multiple sites, unspecified rheumatoid factor presence (HCC)  -     CBC and differential; Future    CKD (chronic kidney disease) stage 3, GFR 30-59 ml/min ([facility])  -     CBC and differential; Future  -     Comprehensive metabolic panel; Future    CLL (chronic lymphocytic leukemia) ([facility])  -     CBC and differential; Future    Dyslipidemia  -     CBC and differential; Future  -     Comprehensive metabolic panel; Future  -     Lipid panel; Future  -     TSH, 3rd generation; Future    Chronic obstructive pulmonary disease, unspecified COPD type ([facility] [address] )  -     CBC and differential; Future    Medicare annual wellness visit, subsequent    Arthritis of right knee  -     Ambulatory referral to Orthopedic Surgery; Future    Peripheral edema  -     betamethasone valerate (VALISONE) 0 1 % cream; Apply topically 2 (two) times a day    Eczema, unspecified type  -     betamethasone valerate (VALISONE) 0 1 % cream; Apply topically 2 (two) times a day        Orders recommendations as noted above  Watch for any illnesses especially with the widespread flu and especially since she does not get immunized  Declines any screening testing at present  Declines any immunizations  Be very careful to avoid falls  Will have her follow up in about 3-4 months or sooner if needed based on the results of her laboratory testing  Subjective:      Patient ID: Lurdes Johnson is a 80 y o  female  She presents for routine follow-up as well as Medicare wellness visit  Has been having persistent pain complaints  Continues with neck pain and low back pain  She did follow-up with Orthopedics regarding this  MRI of the cervical spine was ordered but she has not yet had this done  Continues with significant pain into the legs as well  Right knee has been worsening  Increased stiffness  More difficulty with ambulation  Continues with some stressors at home  Continues to be very active in her home and does most of the activities that do not require strenuous exertion  Breathing has been relatively stable  Denies any significant shortness of breath    She continues with some swelling into the legs  She notices that when she is on antibiotics for respiratory issues, however, the swelling seems to decrease  She has noticed some dry and scaling areas on bilateral lower extremities  She is wondering whether the antibiotics give and anti-inflammatory effect  Tolerating her levothyroxine without difficulty  Denies any significant changes in energy level  She did not have her laboratory testing done prior to this visit  Continues to follow-up with Hematology regularly for the CLL  Denies any fevers or chills  She continues to follow-up with Cardiology regularly for the valvular abnormalities as well as the pulmonary hypertension  She has been stable from this standpoint  Tolerating blood pressure medications without difficulty  Denies any headaches or localized weakness  Usually sleeps relatively well  Appetite has been stable  Denies any nausea, vomiting, or diarrhea  Denies any recent vision changes  The following portions of the patient's history were reviewed and updated as appropriate:   She  has a past medical history of Carpal tunnel syndrome, Cubital tunnel syndrome on left, Dyslipidemia, Ectopic heartbeats, Hypertension, Malignant melanoma of skin ([facility] [address] ), Premature atrial contraction, and Transient ischemic attack    She   Patient Active Problem List    Diagnosis Date Noted    Low back pain 01/27/2020    Lumbosacral spondylosis without myelopathy 01/27/2020    Degeneration of intervertebral disc 01/27/2020    Cervical stenosis of spinal canal 01/27/2020    Arthritis of ankle 01/27/2020    Arthritis of right knee 01/27/2020    Peripheral edema 02/26/2019    Lung nodules 02/22/2019    Kyphosis 10/23/2018    Venous stasis 03/21/2018    CKD (chronic kidney disease) stage 3, GFR 30-59 ml/min ([facility]) 03/20/2018    CLL (chronic lymphocytic leukemia) ([facility] [address] ) 03/20/2018    History of thoracotomy 03/20/2018    History of D&C 03/20/2018    History of lumpectomy of right breast 2018    History of pneumothorax 2018    Hx of emergency  section 2018     (spontaneous vaginal delivery) 2018    Hemoptysis 2018    Pulmonary disease 2018    RA [facility]rheumatoid [facility]) [facility] 2018    Cervical spondylosis without myelopathy 2017    Hypokalemia 2017    Lumbar stenosis 2017    Hyponatremia 10/31/2016    Premature atrial contraction 2016    Scoliosis (and kyphoscoliosis), idiopathic 2016    Trochanteric bursitis of both hips 2015    Dyslipidemia 2014    Combined disorders of mitral, aortic and tricuspid valves 2014    Chronic obstructive pulmonary disease ([facility] ) 2013    Pulmonary artery hypertension ([facility] ) 2013    Pulmonary emphysema ([place] [address] ) 2013    Chronic kidney insufficiency 2012    Esophageal reflux 2012    Benign essential hypertension 2012    Hypothyroidism 2012    Osteoarthritis 2012    Spinal stenosis 2012    History of blood transfusion 1966     She  has a past surgical history that includes Appendectomy; Breast lumpectomy; Cataract extraction;  section; Anterior release vertebral body w/ posterior fusion; Hysterectomy; Lumbar laminectomy; NEUROPLASTY; Oophorectomy; Thoracoscopy w/ talc pleurodesis; and Tonsillectomy  Her family history includes Cancer in her family; Colon cancer in her mother; Diabetes in her maternal uncle and paternal aunt; Heart disease in her father; Hypertension in her mother; Rheum arthritis in her father; Stroke in her family  She  reports that she has never smoked  She has never used smokeless tobacco  She reports that she drinks alcohol  She reports that she does not use drugs    Current Outpatient Medications   Medication Sig Dispense Refill    ALPRAZolam (XANAX) 0 25 mg tablet Take 1 tablet (0 25 mg total) by mouth 2 (two) times a day as needed for anxiety 180 tablet 0    ascorbic acid (VITAMIN C) 250 mg tablet Take 1 tablet by mouth daily      calcium-vitamin D (OSCAL-500) 500-400 MG-UNIT per tablet Take by mouth      CELEBREX 200 MG capsule Take 1 capsule (200 mg total) by mouth 2 (two) times a day BRAND NECESSARY 180 capsule 1    Cholecalciferol (VITAMIN D3) 2000 units capsule Take by mouth      coenzyme Q-10 100 MG capsule Take by mouth      Cranberry 400 MG CAPS Take by mouth      levothyroxine 100 mcg tablet Take 1 tablet (100 mcg total) by mouth daily 90 tablet 3    losartan (COZAAR) 50 mg tablet TAKE ONE-HALF (1/2) TABLET DAILY 45 tablet 4    montelukast (SINGULAIR) 10 mg tablet TAKE 1 TABLET DAILY 90 tablet 4    moxifloxacin (VIGAMOX) 0 5 % ophthalmic solution Apply to eye      olopatadine HCl (PATADAY) 0 2 % opth drops Apply to eye      omeprazole (PRILOSEC) 20 mg delayed release capsule Take 20 mg by mouth      PREMARIN 1 25 MG tablet TAKE 1 TABLET DAILY 90 tablet 3    pyridoxine (VITAMIN B6) 100 mg tablet Take 50 mg by mouth daily        triamterene-hydrochlorothiazide (MAXZIDE-25) 37 5-25 mg per tablet TAKE 1 TABLET DAILY 90 tablet 4    Vitamin E 200 units TABS Take 1 tablet by mouth daily      Zinc Gluconate 15 MG TABS Take 1 tablet by mouth daily      betamethasone valerate (VALISONE) 0 1 % cream Apply topically 2 (two) times a day 60 g 1     No current facility-administered medications for this visit        Current Outpatient Medications on File Prior to Visit   Medication Sig    ALPRAZolam (XANAX) 0 25 mg tablet Take 1 tablet (0 25 mg total) by mouth 2 (two) times a day as needed for anxiety    ascorbic acid (VITAMIN C) 250 mg tablet Take 1 tablet by mouth daily    calcium-vitamin D (OSCAL-500) 500-400 MG-UNIT per tablet Take by mouth    CELEBREX 200 MG capsule Take 1 capsule (200 mg total) by mouth 2 (two) times a day BRAND NECESSARY    Cholecalciferol (VITAMIN D3) 2000 units capsule Take by mouth    coenzyme Q-10 100 MG capsule Take by mouth    Cranberry 400 MG CAPS Take by mouth    levothyroxine 100 mcg tablet Take 1 tablet (100 mcg total) by mouth daily    losartan (COZAAR) 50 mg tablet TAKE ONE-HALF (1/2) TABLET DAILY    montelukast (SINGULAIR) 10 mg tablet TAKE 1 TABLET DAILY    moxifloxacin (VIGAMOX) 0 5 % ophthalmic solution Apply to eye    olopatadine HCl (PATADAY) 0 2 % opth drops Apply to eye    omeprazole (PRILOSEC) 20 mg delayed release capsule Take 20 mg by mouth    PREMARIN 1 25 MG tablet TAKE 1 TABLET DAILY    pyridoxine (VITAMIN B6) 100 mg tablet Take 50 mg by mouth daily      triamterene-hydrochlorothiazide (MAXZIDE-25) 37 5-25 mg per tablet TAKE 1 TABLET DAILY    Vitamin E 200 units TABS Take 1 tablet by mouth daily    Zinc Gluconate 15 MG TABS Take 1 tablet by mouth daily     No current facility-administered medications on file prior to visit  She is allergic to aspirin; codeine polt-chlorphen polt er; etanercept; lorazepam; morphine; oxycodone; penicillins; povidone; povidone iodine; and prednisone       Review of Systems   Constitutional: Negative for activity change, appetite change, chills and fever  HENT: Negative for congestion and rhinorrhea  Eyes: Negative for visual disturbance  Respiratory: Negative for cough, chest tightness and shortness of breath  Cardiovascular: Positive for leg swelling  Negative for chest pain and palpitations  Gastrointestinal: Negative for abdominal pain, blood in stool, diarrhea, nausea and vomiting  Endocrine: Negative for polydipsia, polyphagia and polyuria  Genitourinary: Positive for frequency  Negative for dysuria and urgency  Musculoskeletal: Positive for arthralgias, back pain, gait problem, neck pain and neck stiffness  Skin: Negative for color change  Very dry and scaling areas bilateral lower extremities   Neurological: Negative for dizziness and headaches  Hematological: Does not bruise/bleed easily  Psychiatric/Behavioral: Negative for confusion and sleep disturbance  The patient is not nervous/anxious  Objective:      /82 (BP Location: Right arm, Patient Position: Sitting, Cuff Size: Standard)   Pulse 64   Temp 97 7 °F (36 5 °C) (Temporal)   Ht 4' 9" (1 448 m)   Wt 56 1 kg (123 lb 9 6 oz)   SpO2 97%   BMI 26 75 kg/m²          Physical Exam   Constitutional: She is oriented to person, place, and time  She is cooperative  No distress  HENT:   Head: Normocephalic and atraumatic  Mouth/Throat: Oropharynx is clear and moist    Dry skin bilateral ears left greater than right; slight cerumen   Eyes: Pupils are equal, round, and reactive to light  Conjunctivae are normal  Right eye exhibits no discharge  Left eye exhibits no discharge  Neck: Carotid bruit is not present  Cardiovascular: Normal rate, regular rhythm and normal heart sounds  Exam reveals no gallop and no friction rub  No murmur heard  Pulmonary/Chest: No respiratory distress  She has decreased breath sounds  She has no wheezes  She has no rales  Abdominal: Bowel sounds are normal  She exhibits no distension  There is no tenderness  Musculoskeletal: She exhibits no edema  Bilateral feet with slight erythema on the toes but no open areas; warm to the touch; 1+ peripheral pulses on the feet   Lymphadenopathy:     She has no cervical adenopathy  Neurological: She is alert and oriented to person, place, and time  Skin: Skin is warm and dry  Psychiatric: She has a normal mood and affect  Her speech is normal and behavior is normal  Cognition and memory are normal    Nursing note and vitals reviewed

## 2020-01-28 NOTE — ASSESSMENT & PLAN NOTE
She had been diagnosed in the distant past with COPD but this is uncertain whether it is COPD verses lung issues related to her underlying rheumatologic issues  Watch for any increasing shortness of breath  Continue with the Singulair which has worked well for her  Consider pulmonary function tests in the future  She had followed up with Pulmonary but not recently

## 2020-01-28 NOTE — ASSESSMENT & PLAN NOTE
Blood pressure relatively well controlled  Stay well hydrated  Continue with the losartan  Continue with the triamterene/hydrochlorothiazide  She will get repeat laboratory testing done after the visit today

## 2020-01-28 NOTE — ASSESSMENT & PLAN NOTE
Persistent joint issues likely related to not only her rheumatoid arthritis but some underlying osteoarthritis  Risks and benefits of the Celebrex discussed  She understands the potential issues with edema and effects on the kidneys  Recommended following up with rheumatology but she declines at present

## 2020-01-28 NOTE — ASSESSMENT & PLAN NOTE
Continues with the chronic neck issues  Recommended she follow up with Orthopedics  Discussed with her following through on the MRI as per their direction

## 2020-01-28 NOTE — ASSESSMENT & PLAN NOTE
Discussed her peripheral edema with her  This seems to wax and wane  Discussed use of compression stockings  Discussed following up with Podiatry because of some concerns regarding her feet  Elevate legs as much as possible  Discussed with her that antibiotic use should not affect her peripheral edema although she feels that it is closely related with the edema decreasing when she is on antibiotics  Discussed using steroid cream to the lower extremities especially because of some dermatitis changes  Prescription will be sent

## 2020-01-28 NOTE — ASSESSMENT & PLAN NOTE
Continue follow-up with Hematology on a regular basis  She has been relatively stable from this standpoint

## 2020-01-28 NOTE — ASSESSMENT & PLAN NOTE
Arthritis symptoms into the knees have worsened right greater than left  Significant degenerative changes on exam   Will refer her to Orthopedics for further investigation and potential treatment

## 2020-02-04 ENCOUNTER — TELEPHONE (OUTPATIENT)
Dept: INTERNAL MEDICINE CLINIC | Facility: CLINIC | Age: 85
End: 2020-02-04

## 2020-02-04 NOTE — TELEPHONE ENCOUNTER
Received a notice from [facility] that the Betamethasone cream is non covered as it is from a non-participating manufacture with the insurance plan   Is there something else that you can call in?

## 2020-02-08 DIAGNOSIS — E03.9 HYPOTHYROIDISM, UNSPECIFIED TYPE: ICD-10-CM

## 2020-02-08 RX ORDER — LEVOTHYROXINE SODIUM 0.1 MG/1
TABLET ORAL
Qty: 90 TABLET | Refills: 4 | Status: SHIPPED | OUTPATIENT
Start: 2020-02-08 | End: 2020-02-18

## 2020-02-18 RX ORDER — LEVOTHYROXINE SODIUM 112 MCG
112 TABLET ORAL DAILY
Qty: 90 TABLET | Refills: 3 | Status: SHIPPED | OUTPATIENT
Start: 2020-02-18 | End: 2020-03-24 | Stop reason: SDUPTHER

## 2020-02-21 ENCOUNTER — TELEPHONE (OUTPATIENT)
Dept: INTERNAL MEDICINE CLINIC | Facility: CLINIC | Age: 85
End: 2020-02-21

## 2020-03-20 ENCOUNTER — TELEPHONE (OUTPATIENT)
Dept: OBGYN CLINIC | Facility: CLINIC | Age: 85
End: 2020-03-20

## 2020-03-22 DIAGNOSIS — M06.9 RHEUMATOID ARTHRITIS INVOLVING MULTIPLE SITES, UNSPECIFIED RHEUMATOID FACTOR PRESENCE: ICD-10-CM

## 2020-03-24 DIAGNOSIS — E03.9 HYPOTHYROIDISM, UNSPECIFIED TYPE: ICD-10-CM

## 2020-03-24 RX ORDER — LEVOTHYROXINE SODIUM 112 MCG
112 TABLET ORAL DAILY
Qty: 90 TABLET | Refills: 3 | Status: SHIPPED | OUTPATIENT
Start: 2020-03-24 | End: 2021-03-19

## 2020-03-31 DIAGNOSIS — F41.9 ANXIETY: ICD-10-CM

## 2020-03-31 RX ORDER — ALPRAZOLAM 0.25 MG/1
0.25 TABLET ORAL 2 TIMES DAILY PRN
Qty: 180 TABLET | Refills: 0 | Status: SHIPPED | OUTPATIENT
Start: 2020-03-31 | End: 2020-07-07 | Stop reason: SDUPTHER

## 2020-04-06 ENCOUNTER — TELEPHONE (OUTPATIENT)
Dept: INTERNAL MEDICINE CLINIC | Facility: CLINIC | Age: 85
End: 2020-04-06

## 2020-04-06 DIAGNOSIS — L03.119 CELLULITIS OF LOWER EXTREMITY, UNSPECIFIED LATERALITY: Primary | ICD-10-CM

## 2020-04-06 RX ORDER — DOXYCYCLINE 100 MG/1
100 CAPSULE ORAL 2 TIMES DAILY
Qty: 14 CAPSULE | Refills: 0 | Status: SHIPPED | OUTPATIENT
Start: 2020-04-06 | End: 2020-04-07

## 2020-04-07 ENCOUNTER — TELEPHONE (OUTPATIENT)
Dept: INTERNAL MEDICINE CLINIC | Facility: CLINIC | Age: 85
End: 2020-04-07

## 2020-04-07 DIAGNOSIS — L03.119 CELLULITIS OF LOWER EXTREMITY, UNSPECIFIED LATERALITY: Primary | ICD-10-CM

## 2020-04-07 RX ORDER — DOXYCYCLINE HYCLATE 100 MG
100 TABLET ORAL 2 TIMES DAILY
Qty: 14 TABLET | Refills: 0 | Status: SHIPPED | OUTPATIENT
Start: 2020-04-07 | End: 2020-04-14

## 2020-04-17 ENCOUNTER — TELEMEDICINE (OUTPATIENT)
Dept: CARDIOLOGY CLINIC | Facility: HOSPITAL | Age: 85
End: 2020-04-17
Payer: MEDICARE

## 2020-04-17 VITALS
WEIGHT: 120 LBS | HEART RATE: 75 BPM | SYSTOLIC BLOOD PRESSURE: 125 MMHG | BODY MASS INDEX: 25.97 KG/M2 | DIASTOLIC BLOOD PRESSURE: 65 MMHG

## 2020-04-17 DIAGNOSIS — E78.5 DYSLIPIDEMIA: ICD-10-CM

## 2020-04-17 DIAGNOSIS — I49.1 PREMATURE ATRIAL CONTRACTION: ICD-10-CM

## 2020-04-17 DIAGNOSIS — I08.3 COMBINED DISORDERS OF MITRAL, AORTIC AND TRICUSPID VALVES: ICD-10-CM

## 2020-04-17 DIAGNOSIS — I10 BENIGN ESSENTIAL HYPERTENSION: ICD-10-CM

## 2020-04-17 DIAGNOSIS — I27.21 PULMONARY ARTERY HYPERTENSION (HCC): Primary | ICD-10-CM

## 2020-04-17 PROCEDURE — 99442 PR PHYS/QHP TELEPHONE EVALUATION 11-20 MIN: CPT | Performed by: INTERNAL MEDICINE

## 2020-04-23 DIAGNOSIS — Z78.0 POSTMENOPAUSAL: ICD-10-CM

## 2020-04-23 RX ORDER — ESTROGENS, CONJUGATED 1.25 MG
TABLET ORAL
Qty: 90 TABLET | Refills: 3 | Status: SHIPPED | OUTPATIENT
Start: 2020-04-23 | End: 2021-04-18

## 2020-06-01 ENCOUNTER — OFFICE VISIT (OUTPATIENT)
Dept: INTERNAL MEDICINE CLINIC | Facility: CLINIC | Age: 85
End: 2020-06-01
Payer: MEDICARE

## 2020-06-01 VITALS
OXYGEN SATURATION: 99 % | SYSTOLIC BLOOD PRESSURE: 126 MMHG | HEIGHT: 57 IN | DIASTOLIC BLOOD PRESSURE: 70 MMHG | HEART RATE: 61 BPM | WEIGHT: 122.8 LBS | TEMPERATURE: 98.1 F | BODY MASS INDEX: 26.49 KG/M2 | RESPIRATION RATE: 14 BRPM

## 2020-06-01 DIAGNOSIS — I27.21 PULMONARY ARTERY HYPERTENSION (HCC): ICD-10-CM

## 2020-06-01 DIAGNOSIS — R60.9 PERIPHERAL EDEMA: ICD-10-CM

## 2020-06-01 DIAGNOSIS — M06.9 RHEUMATOID ARTHRITIS INVOLVING MULTIPLE SITES, UNSPECIFIED RHEUMATOID FACTOR PRESENCE: ICD-10-CM

## 2020-06-01 DIAGNOSIS — E78.5 DYSLIPIDEMIA: ICD-10-CM

## 2020-06-01 DIAGNOSIS — I10 BENIGN ESSENTIAL HYPERTENSION: Primary | ICD-10-CM

## 2020-06-01 DIAGNOSIS — N18.30 CKD (CHRONIC KIDNEY DISEASE) STAGE 3, GFR 30-59 ML/MIN (HCC): ICD-10-CM

## 2020-06-01 PROCEDURE — 4040F PNEUMOC VAC/ADMIN/RCVD: CPT | Performed by: FAMILY MEDICINE

## 2020-06-01 PROCEDURE — 1160F RVW MEDS BY RX/DR IN RCRD: CPT | Performed by: FAMILY MEDICINE

## 2020-06-01 PROCEDURE — 99214 OFFICE O/P EST MOD 30 MIN: CPT | Performed by: FAMILY MEDICINE

## 2020-06-01 PROCEDURE — 1036F TOBACCO NON-USER: CPT | Performed by: FAMILY MEDICINE

## 2020-06-01 RX ORDER — MUPIROCIN CALCIUM 20 MG/G
CREAM TOPICAL 3 TIMES DAILY
Qty: 30 G | Refills: 4 | Status: SHIPPED | OUTPATIENT
Start: 2020-06-01 | End: 2022-06-30

## 2020-06-02 ENCOUNTER — APPOINTMENT (OUTPATIENT)
Dept: LAB | Facility: HOSPITAL | Age: 85
End: 2020-06-02
Attending: INTERNAL MEDICINE
Payer: MEDICARE

## 2020-06-02 DIAGNOSIS — R60.9 PERIPHERAL EDEMA: ICD-10-CM

## 2020-06-02 DIAGNOSIS — C91.10 CLL (CHRONIC LYMPHOCYTIC LEUKEMIA) (HCC): ICD-10-CM

## 2020-06-02 DIAGNOSIS — N18.30 CKD (CHRONIC KIDNEY DISEASE) STAGE 3, GFR 30-59 ML/MIN (HCC): ICD-10-CM

## 2020-06-02 DIAGNOSIS — M06.9 RHEUMATOID ARTHRITIS INVOLVING MULTIPLE SITES, UNSPECIFIED RHEUMATOID FACTOR PRESENCE: ICD-10-CM

## 2020-06-02 DIAGNOSIS — I27.21 PULMONARY ARTERY HYPERTENSION (HCC): ICD-10-CM

## 2020-06-02 DIAGNOSIS — E78.5 DYSLIPIDEMIA: ICD-10-CM

## 2020-06-02 DIAGNOSIS — I10 BENIGN ESSENTIAL HYPERTENSION: ICD-10-CM

## 2020-06-02 LAB
25(OH)D3 SERPL-MCNC: 55.9 NG/ML (ref 30–100)
ALBUMIN SERPL BCP-MCNC: 3.8 G/DL (ref 3.5–5)
ALP SERPL-CCNC: 72 U/L (ref 46–116)
ALT SERPL W P-5'-P-CCNC: 25 U/L (ref 12–78)
ANION GAP SERPL CALCULATED.3IONS-SCNC: 7 MMOL/L (ref 4–13)
AST SERPL W P-5'-P-CCNC: 32 U/L (ref 5–45)
BASOPHILS # BLD MANUAL: 0 THOUSAND/UL (ref 0–0.1)
BASOPHILS NFR MAR MANUAL: 0 % (ref 0–1)
BILIRUB SERPL-MCNC: 0.5 MG/DL (ref 0.2–1)
BUN SERPL-MCNC: 22 MG/DL (ref 5–25)
CALCIUM SERPL-MCNC: 9.6 MG/DL (ref 8.3–10.1)
CHLORIDE SERPL-SCNC: 100 MMOL/L (ref 100–108)
CHOLEST SERPL-MCNC: 248 MG/DL (ref 50–200)
CO2 SERPL-SCNC: 30 MMOL/L (ref 21–32)
CREAT SERPL-MCNC: 1.29 MG/DL (ref 0.6–1.3)
CRP SERPL QL: 6.9 MG/L
EOSINOPHIL # BLD MANUAL: 1.25 THOUSAND/UL (ref 0–0.4)
EOSINOPHIL NFR BLD MANUAL: 6 % (ref 0–6)
ERYTHROCYTE [DISTWIDTH] IN BLOOD BY AUTOMATED COUNT: 12.7 % (ref 11.6–15.1)
GFR SERPL CREATININE-BSD FRML MDRD: 38 ML/MIN/1.73SQ M
GLUCOSE SERPL-MCNC: 80 MG/DL (ref 65–140)
HCT VFR BLD AUTO: 38.9 % (ref 34.8–46.1)
HDLC SERPL-MCNC: 99 MG/DL
HGB BLD-MCNC: 12.7 G/DL (ref 11.5–15.4)
LDLC SERPL CALC-MCNC: 133 MG/DL (ref 0–100)
LG PLATELETS BLD QL SMEAR: PRESENT
LYMPHOCYTES # BLD AUTO: 37 % (ref 14–44)
LYMPHOCYTES # BLD AUTO: 7.7 THOUSAND/UL (ref 0.6–4.47)
MCH RBC QN AUTO: 30.5 PG (ref 26.8–34.3)
MCHC RBC AUTO-ENTMCNC: 32.6 G/DL (ref 31.4–37.4)
MCV RBC AUTO: 93 FL (ref 82–98)
MONOCYTES # BLD AUTO: 1.25 THOUSAND/UL (ref 0–1.22)
MONOCYTES NFR BLD: 6 % (ref 4–12)
NEUTROPHILS # BLD MANUAL: 10.62 THOUSAND/UL (ref 1.85–7.62)
NEUTS BAND NFR BLD MANUAL: 2 % (ref 0–8)
NEUTS SEG NFR BLD AUTO: 49 % (ref 43–75)
NONHDLC SERPL-MCNC: 149 MG/DL
NRBC BLD AUTO-RTO: 0 /100 WBCS
NT-PROBNP SERPL-MCNC: 809 PG/ML
PLATELET # BLD AUTO: 222 THOUSANDS/UL (ref 149–390)
PLATELET BLD QL SMEAR: ADEQUATE
PMV BLD AUTO: 11 FL (ref 8.9–12.7)
POTASSIUM SERPL-SCNC: 3.6 MMOL/L (ref 3.5–5.3)
PROT SERPL-MCNC: 7.7 G/DL (ref 6.4–8.2)
RBC # BLD AUTO: 4.17 MILLION/UL (ref 3.81–5.12)
SMUDGE CELLS BLD QL SMEAR: PRESENT
SODIUM SERPL-SCNC: 137 MMOL/L (ref 136–145)
TOTAL CELLS COUNTED SPEC: 100
TRIGL SERPL-MCNC: 79 MG/DL
TSH SERPL DL<=0.05 MIU/L-ACNC: 1.21 UIU/ML (ref 0.36–3.74)
WBC # BLD AUTO: 20.82 THOUSAND/UL (ref 4.31–10.16)

## 2020-06-02 PROCEDURE — 85027 COMPLETE CBC AUTOMATED: CPT

## 2020-06-02 PROCEDURE — 80061 LIPID PANEL: CPT

## 2020-06-02 PROCEDURE — 85007 BL SMEAR W/DIFF WBC COUNT: CPT

## 2020-06-02 PROCEDURE — 86140 C-REACTIVE PROTEIN: CPT

## 2020-06-02 PROCEDURE — 36415 COLL VENOUS BLD VENIPUNCTURE: CPT

## 2020-06-02 PROCEDURE — 80053 COMPREHEN METABOLIC PANEL: CPT

## 2020-06-02 PROCEDURE — 82306 VITAMIN D 25 HYDROXY: CPT

## 2020-06-02 PROCEDURE — 83880 ASSAY OF NATRIURETIC PEPTIDE: CPT

## 2020-06-02 PROCEDURE — 84443 ASSAY THYROID STIM HORMONE: CPT

## 2020-06-26 ENCOUNTER — OFFICE VISIT (OUTPATIENT)
Dept: HEMATOLOGY ONCOLOGY | Facility: CLINIC | Age: 85
End: 2020-06-26
Payer: MEDICARE

## 2020-06-26 VITALS
HEART RATE: 72 BPM | RESPIRATION RATE: 16 BRPM | BODY MASS INDEX: 24.89 KG/M2 | HEIGHT: 57 IN | WEIGHT: 115.4 LBS | OXYGEN SATURATION: 98 % | TEMPERATURE: 97.6 F | SYSTOLIC BLOOD PRESSURE: 136 MMHG | DIASTOLIC BLOOD PRESSURE: 74 MMHG

## 2020-06-26 DIAGNOSIS — C91.10 CLL (CHRONIC LYMPHOCYTIC LEUKEMIA) (HCC): Primary | ICD-10-CM

## 2020-06-26 PROCEDURE — 1160F RVW MEDS BY RX/DR IN RCRD: CPT | Performed by: INTERNAL MEDICINE

## 2020-06-26 PROCEDURE — 99213 OFFICE O/P EST LOW 20 MIN: CPT | Performed by: INTERNAL MEDICINE

## 2020-06-26 PROCEDURE — 4040F PNEUMOC VAC/ADMIN/RCVD: CPT | Performed by: INTERNAL MEDICINE

## 2020-06-26 PROCEDURE — 1036F TOBACCO NON-USER: CPT | Performed by: INTERNAL MEDICINE

## 2020-07-07 DIAGNOSIS — F41.9 ANXIETY: ICD-10-CM

## 2020-07-07 RX ORDER — ALPRAZOLAM 0.25 MG/1
0.25 TABLET ORAL 2 TIMES DAILY PRN
Qty: 180 TABLET | Refills: 0 | Status: SHIPPED | OUTPATIENT
Start: 2020-07-07 | End: 2020-07-07 | Stop reason: SDUPTHER

## 2020-07-07 RX ORDER — ALPRAZOLAM 0.25 MG/1
0.25 TABLET ORAL 2 TIMES DAILY PRN
Qty: 180 TABLET | Refills: 0 | Status: SHIPPED | OUTPATIENT
Start: 2020-07-07 | End: 2020-10-05 | Stop reason: SDUPTHER

## 2020-09-09 ENCOUNTER — TELEPHONE (OUTPATIENT)
Dept: INTERNAL MEDICINE CLINIC | Facility: CLINIC | Age: 85
End: 2020-09-09

## 2020-09-09 NOTE — TELEPHONE ENCOUNTER
After receiving a request from [facility] for records on patient backing up script for face mask, I called patient  Patient has given me verbal consent to forward chart note to [facility] to back up script

## 2020-09-10 ENCOUNTER — TELEPHONE (OUTPATIENT)
Dept: INTERNAL MEDICINE CLINIC | Facility: CLINIC | Age: 85
End: 2020-09-10

## 2020-10-05 ENCOUNTER — APPOINTMENT (OUTPATIENT)
Dept: LAB | Facility: HOSPITAL | Age: 85
End: 2020-10-05
Payer: MEDICARE

## 2020-10-05 ENCOUNTER — OFFICE VISIT (OUTPATIENT)
Dept: INTERNAL MEDICINE CLINIC | Facility: CLINIC | Age: 85
End: 2020-10-05
Payer: MEDICARE

## 2020-10-05 VITALS
SYSTOLIC BLOOD PRESSURE: 130 MMHG | OXYGEN SATURATION: 96 % | DIASTOLIC BLOOD PRESSURE: 72 MMHG | HEIGHT: 57 IN | TEMPERATURE: 96.8 F | WEIGHT: 116.8 LBS | BODY MASS INDEX: 25.2 KG/M2 | HEART RATE: 87 BPM

## 2020-10-05 DIAGNOSIS — E78.5 DYSLIPIDEMIA: ICD-10-CM

## 2020-10-05 DIAGNOSIS — I10 BENIGN ESSENTIAL HYPERTENSION: ICD-10-CM

## 2020-10-05 DIAGNOSIS — M06.9 RHEUMATOID ARTHRITIS INVOLVING MULTIPLE SITES, UNSPECIFIED WHETHER RHEUMATOID FACTOR PRESENT (HCC): ICD-10-CM

## 2020-10-05 DIAGNOSIS — C91.10 CLL (CHRONIC LYMPHOCYTIC LEUKEMIA) (HCC): ICD-10-CM

## 2020-10-05 DIAGNOSIS — F41.9 ANXIETY: ICD-10-CM

## 2020-10-05 DIAGNOSIS — E03.9 HYPOTHYROIDISM, UNSPECIFIED TYPE: Primary | ICD-10-CM

## 2020-10-05 DIAGNOSIS — N18.30 STAGE 3 CHRONIC KIDNEY DISEASE, UNSPECIFIED WHETHER STAGE 3A OR 3B CKD (HCC): ICD-10-CM

## 2020-10-05 DIAGNOSIS — M48.00 SPINAL STENOSIS, UNSPECIFIED SPINAL REGION: ICD-10-CM

## 2020-10-05 DIAGNOSIS — I49.1 PREMATURE ATRIAL CONTRACTION: ICD-10-CM

## 2020-10-05 DIAGNOSIS — E03.9 HYPOTHYROIDISM, UNSPECIFIED TYPE: ICD-10-CM

## 2020-10-05 LAB
ALBUMIN SERPL BCP-MCNC: 3.6 G/DL (ref 3.5–5)
ALP SERPL-CCNC: 65 U/L (ref 46–116)
ALT SERPL W P-5'-P-CCNC: 21 U/L (ref 12–78)
ANION GAP SERPL CALCULATED.3IONS-SCNC: 5 MMOL/L (ref 4–13)
AST SERPL W P-5'-P-CCNC: 35 U/L (ref 5–45)
BASOPHILS # BLD MANUAL: 0 THOUSAND/UL (ref 0–0.1)
BASOPHILS NFR MAR MANUAL: 0 % (ref 0–1)
BILIRUB SERPL-MCNC: 0.5 MG/DL (ref 0.2–1)
BUN SERPL-MCNC: 25 MG/DL (ref 5–25)
CALCIUM SERPL-MCNC: 9.4 MG/DL (ref 8.3–10.1)
CHLORIDE SERPL-SCNC: 98 MMOL/L (ref 100–108)
CHOLEST SERPL-MCNC: 250 MG/DL (ref 50–200)
CO2 SERPL-SCNC: 32 MMOL/L (ref 21–32)
CREAT SERPL-MCNC: 1.31 MG/DL (ref 0.6–1.3)
EOSINOPHIL # BLD MANUAL: 0 THOUSAND/UL (ref 0–0.4)
EOSINOPHIL NFR BLD MANUAL: 0 % (ref 0–6)
ERYTHROCYTE [DISTWIDTH] IN BLOOD BY AUTOMATED COUNT: 12.9 % (ref 11.6–15.1)
GFR SERPL CREATININE-BSD FRML MDRD: 37 ML/MIN/1.73SQ M
GLUCOSE P FAST SERPL-MCNC: 82 MG/DL (ref 65–99)
HCT VFR BLD AUTO: 38.4 % (ref 34.8–46.1)
HDLC SERPL-MCNC: 99 MG/DL
HGB BLD-MCNC: 12.6 G/DL (ref 11.5–15.4)
LDLC SERPL CALC-MCNC: 139 MG/DL (ref 0–100)
LG PLATELETS BLD QL SMEAR: PRESENT
LYMPHOCYTES # BLD AUTO: 11.57 THOUSAND/UL (ref 0.6–4.47)
LYMPHOCYTES # BLD AUTO: 60 % (ref 14–44)
MCH RBC QN AUTO: 30.4 PG (ref 26.8–34.3)
MCHC RBC AUTO-ENTMCNC: 32.8 G/DL (ref 31.4–37.4)
MCV RBC AUTO: 93 FL (ref 82–98)
MONOCYTES # BLD AUTO: 0.77 THOUSAND/UL (ref 0–1.22)
MONOCYTES NFR BLD: 4 % (ref 4–12)
NEUTROPHILS # BLD MANUAL: 6.94 THOUSAND/UL (ref 1.85–7.62)
NEUTS SEG NFR BLD AUTO: 36 % (ref 43–75)
NONHDLC SERPL-MCNC: 151 MG/DL
NRBC BLD AUTO-RTO: 0 /100 WBCS
PLATELET # BLD AUTO: 228 THOUSANDS/UL (ref 149–390)
PLATELET BLD QL SMEAR: ADEQUATE
PMV BLD AUTO: 11.1 FL (ref 8.9–12.7)
POTASSIUM SERPL-SCNC: 3.4 MMOL/L (ref 3.5–5.3)
PROT SERPL-MCNC: 7.3 G/DL (ref 6.4–8.2)
RBC # BLD AUTO: 4.15 MILLION/UL (ref 3.81–5.12)
RBC MORPH BLD: NORMAL
SMUDGE CELLS BLD QL SMEAR: PRESENT
SODIUM SERPL-SCNC: 135 MMOL/L (ref 136–145)
TOTAL CELLS COUNTED SPEC: 100
TRIGL SERPL-MCNC: 62 MG/DL
TSH SERPL DL<=0.05 MIU/L-ACNC: 4.61 UIU/ML (ref 0.36–3.74)
WBC # BLD AUTO: 19.29 THOUSAND/UL (ref 4.31–10.16)

## 2020-10-05 PROCEDURE — 80061 LIPID PANEL: CPT

## 2020-10-05 PROCEDURE — 85027 COMPLETE CBC AUTOMATED: CPT

## 2020-10-05 PROCEDURE — 84443 ASSAY THYROID STIM HORMONE: CPT

## 2020-10-05 PROCEDURE — 36415 COLL VENOUS BLD VENIPUNCTURE: CPT

## 2020-10-05 PROCEDURE — 80053 COMPREHEN METABOLIC PANEL: CPT

## 2020-10-05 PROCEDURE — 85007 BL SMEAR W/DIFF WBC COUNT: CPT

## 2020-10-05 PROCEDURE — 99214 OFFICE O/P EST MOD 30 MIN: CPT | Performed by: FAMILY MEDICINE

## 2020-10-05 RX ORDER — ALPRAZOLAM 0.25 MG/1
0.25 TABLET ORAL 2 TIMES DAILY PRN
Qty: 180 TABLET | Refills: 0 | Status: SHIPPED | OUTPATIENT
Start: 2020-10-05 | End: 2020-10-05 | Stop reason: SDUPTHER

## 2020-10-05 RX ORDER — ALPRAZOLAM 0.25 MG/1
0.25 TABLET ORAL 2 TIMES DAILY PRN
Qty: 180 TABLET | Refills: 0 | Status: SHIPPED | OUTPATIENT
Start: 2020-10-05 | End: 2021-02-23

## 2020-10-06 ENCOUNTER — OFFICE VISIT (OUTPATIENT)
Dept: OBGYN CLINIC | Facility: CLINIC | Age: 85
End: 2020-10-06
Payer: MEDICARE

## 2020-10-06 ENCOUNTER — APPOINTMENT (OUTPATIENT)
Dept: RADIOLOGY | Facility: CLINIC | Age: 85
End: 2020-10-06
Payer: MEDICARE

## 2020-10-06 VITALS — HEIGHT: 57 IN | WEIGHT: 116 LBS | BODY MASS INDEX: 25.03 KG/M2 | TEMPERATURE: 96 F

## 2020-10-06 DIAGNOSIS — M25.562 CHRONIC PAIN OF BOTH KNEES: ICD-10-CM

## 2020-10-06 DIAGNOSIS — M25.561 CHRONIC PAIN OF BOTH KNEES: ICD-10-CM

## 2020-10-06 DIAGNOSIS — M17.0 BILATERAL PRIMARY OSTEOARTHRITIS OF KNEE: Primary | ICD-10-CM

## 2020-10-06 DIAGNOSIS — G89.29 CHRONIC PAIN OF BOTH KNEES: ICD-10-CM

## 2020-10-06 PROCEDURE — 20610 DRAIN/INJ JOINT/BURSA W/O US: CPT | Performed by: ORTHOPAEDIC SURGERY

## 2020-10-06 PROCEDURE — 73564 X-RAY EXAM KNEE 4 OR MORE: CPT

## 2020-10-06 PROCEDURE — 99203 OFFICE O/P NEW LOW 30 MIN: CPT | Performed by: ORTHOPAEDIC SURGERY

## 2020-10-06 RX ORDER — LIDOCAINE HYDROCHLORIDE 10 MG/ML
5 INJECTION, SOLUTION INFILTRATION; PERINEURAL
Status: COMPLETED | OUTPATIENT
Start: 2020-10-06 | End: 2020-10-06

## 2020-10-06 RX ORDER — BETAMETHASONE SODIUM PHOSPHATE AND BETAMETHASONE ACETATE 3; 3 MG/ML; MG/ML
6 INJECTION, SUSPENSION INTRA-ARTICULAR; INTRALESIONAL; INTRAMUSCULAR; SOFT TISSUE
Status: COMPLETED | OUTPATIENT
Start: 2020-10-06 | End: 2020-10-06

## 2020-10-06 RX ADMIN — BETAMETHASONE SODIUM PHOSPHATE AND BETAMETHASONE ACETATE 6 MG: 3; 3 INJECTION, SUSPENSION INTRA-ARTICULAR; INTRALESIONAL; INTRAMUSCULAR; SOFT TISSUE at 15:24

## 2020-10-06 RX ADMIN — LIDOCAINE HYDROCHLORIDE 5 ML: 10 INJECTION, SOLUTION INFILTRATION; PERINEURAL at 15:24

## 2020-11-12 DIAGNOSIS — J30.1 ALLERGIC RHINITIS DUE TO POLLEN, UNSPECIFIED SEASONALITY: ICD-10-CM

## 2020-11-12 RX ORDER — MONTELUKAST SODIUM 10 MG/1
TABLET ORAL
Qty: 90 TABLET | Refills: 3 | Status: SHIPPED | OUTPATIENT
Start: 2020-11-12 | End: 2021-11-08

## 2020-11-19 ENCOUNTER — TELEPHONE (OUTPATIENT)
Dept: HEMATOLOGY ONCOLOGY | Facility: CLINIC | Age: 85
End: 2020-11-19

## 2021-01-05 ENCOUNTER — OFFICE VISIT (OUTPATIENT)
Dept: INTERNAL MEDICINE CLINIC | Facility: CLINIC | Age: 86
End: 2021-01-05
Payer: MEDICARE

## 2021-01-05 VITALS — DIASTOLIC BLOOD PRESSURE: 79 MMHG | SYSTOLIC BLOOD PRESSURE: 130 MMHG | HEART RATE: 72 BPM | OXYGEN SATURATION: 93 %

## 2021-01-05 DIAGNOSIS — E87.6 HYPOKALEMIA: ICD-10-CM

## 2021-01-05 DIAGNOSIS — C91.10 CLL (CHRONIC LYMPHOCYTIC LEUKEMIA) (HCC): ICD-10-CM

## 2021-01-05 DIAGNOSIS — M06.9 RHEUMATOID ARTHRITIS INVOLVING MULTIPLE SITES, UNSPECIFIED WHETHER RHEUMATOID FACTOR PRESENT (HCC): ICD-10-CM

## 2021-01-05 DIAGNOSIS — I08.3 COMBINED DISORDERS OF MITRAL, AORTIC AND TRICUSPID VALVES: ICD-10-CM

## 2021-01-05 DIAGNOSIS — R60.9 PERIPHERAL EDEMA: Primary | ICD-10-CM

## 2021-01-05 DIAGNOSIS — J44.9 CHRONIC OBSTRUCTIVE PULMONARY DISEASE, UNSPECIFIED COPD TYPE (HCC): ICD-10-CM

## 2021-01-05 DIAGNOSIS — I10 BENIGN ESSENTIAL HYPERTENSION: ICD-10-CM

## 2021-01-05 DIAGNOSIS — I27.21 PULMONARY ARTERY HYPERTENSION (HCC): ICD-10-CM

## 2021-01-05 DIAGNOSIS — E78.5 DYSLIPIDEMIA: ICD-10-CM

## 2021-01-05 DIAGNOSIS — N18.30 STAGE 3 CHRONIC KIDNEY DISEASE, UNSPECIFIED WHETHER STAGE 3A OR 3B CKD (HCC): ICD-10-CM

## 2021-01-05 PROCEDURE — 99214 OFFICE O/P EST MOD 30 MIN: CPT | Performed by: FAMILY MEDICINE

## 2021-01-05 RX ORDER — FUROSEMIDE 20 MG/1
20 TABLET ORAL DAILY
Qty: 30 TABLET | Refills: 5 | Status: SHIPPED | OUTPATIENT
Start: 2021-01-05

## 2021-01-05 RX ORDER — POTASSIUM CHLORIDE 20 MEQ/1
20 TABLET, EXTENDED RELEASE ORAL DAILY
Qty: 30 TABLET | Refills: 5 | Status: SHIPPED | OUTPATIENT
Start: 2021-01-05 | End: 2021-01-23

## 2021-01-05 NOTE — PROGRESS NOTES
Assessment/Plan:    No problem-specific Assessment & Plan notes found for this encounter  Diagnoses and all orders for this visit:    Peripheral edema  -     NT-BNP PRO; Future  -     furosemide (LASIX) 20 mg tablet; Take 1 tablet (20 mg total) by mouth daily  -     potassium chloride (K-DUR,KLOR-CON) 20 mEq tablet; Take 1 tablet (20 mEq total) by mouth daily    Chronic obstructive pulmonary disease, unspecified COPD type ([facility] [address] )    Benign essential hypertension  -     CBC and differential; Future  -     Comprehensive metabolic panel; Future  -     Lipid panel; Future    Combined disorders of mitral, aortic and tricuspid valves    Rheumatoid arthritis involving multiple sites, unspecified whether rheumatoid factor present (HCC)  -     CBC and differential; Future    Stage 3 chronic kidney disease, unspecified whether stage 3a or 3b CKD  -     Comprehensive metabolic panel; Future  -     Lipid panel; Future    Hypokalemia  -     potassium chloride (K-DUR,KLOR-CON) 20 mEq tablet; Take 1 tablet (20 mEq total) by mouth daily    CLL (chronic lymphocytic leukemia) (HCC)    Pulmonary artery hypertension (HCC)    Dyslipidemia  -     Lipid panel; Future  -     TSH, 3rd generation; Future        Orders and recommendations as noted above  Previous laboratory testing reviewed with her  Blood work is relatively stable  She has, however, began with significant peripheral edema on the bilateral lower extremities  Discussed with her potential causes for this  She is on her feet quite a lot during the day but this is essentially unchanged  Discussed with her the need to follow-up with Cardiology since she has not done this recently because of the coronavirus outbreak  Use of Tubigrip discussed  Some of this was provided for her  Advised her to use this on a daily basis if possible  She had been taking her son's Lasix with some improvement    Will start her on low-dose Lasix initially daily and then try to cut back to about 2-3 days a week if possible especially since she has the history of the renal insufficiency  She will likely need potassium on a daily basis when she is taking the Lasix daily but then hopefully this can be decreased as well  She does have known valvular abnormalities which may be contributing to the edema  Pulse ox is running lower but this may be related to the colder weather and the pulse ox not registering well  Watch for any increased shortness of breath  There were no rales on exam   Watch salt intake  Elevate legs as much as possible  Blood pressure relatively well controlled  Continue current medications  Continue to keep joints as active as possible to avoid stiffening especially with the known rheumatoid arthritis  Stay adequately hydrated  Continue follow-up with Hematology regarding the chronic lymphocytic leukemia  Will have her follow up in about 2 months or sooner if needed depending on the edema and response to treatment as well as cardiology's evaluation  Will have a home lab draw in approximately 2 weeks  Subjective:      Patient ID: Genet Caraballo is a 80 y o  female  She presents for follow-up  She has been having significant increase in the peripheral edema over the last few weeks but even going back a few months  This seem to start to worsen significantly over the last 2-3 weeks  Legs feel heavy  Significant indent every day with her socks  Denies any significant shortness of breath with this  Her daughter does feel that she seem short of breath with activities at times  Denies any chest pain or palpitations  She is well overdue to follow-up with Cardiology since she had postpone this because of the coronavirus outbreak  Diet has not changed significantly  She eats well  Does not typically watch her salt intake very closely  Denies any nausea, vomiting, or diarrhea  Denies any changes in bowel movements    She does have significant pain into the joints especially the neck and back  Usually sleeps relatively well  Denies any urinary symptoms at present  Vision has been st stable  Tolerating her levothyroxine without difficulty  Denies any changes in energy level  The following portions of the patient's history were reviewed and updated as appropriate:   She  has a past medical history of Carpal tunnel syndrome, Cubital tunnel syndrome on left, Dyslipidemia, Ectopic heartbeats, Hypertension, Malignant melanoma of skin ([facility] [address] ), Premature atrial contraction, and Transient ischemic attack    She   Patient Active Problem List    Diagnosis Date Noted    Bilateral primary osteoarthritis of knee 10/06/2020    Low back pain 2020    Lumbosacral spondylosis without myelopathy 2020    Degeneration of intervertebral disc 2020    Cervical stenosis of spinal canal 2020    Arthritis of ankle 2020    Arthritis of right knee 2020    Peripheral edema 2019    Lung nodules 2019    Kyphosis 10/23/2018    Venous stasis 2018    CKD (chronic kidney disease) stage 3, GFR 30-59 ml/min 2018    CLL (chronic lymphocytic leukemia) ([facility] [address] ) 2018    History of thoracotomy 2018    History of D&C 2018    History of lumpectomy of right breast 2018    History of pneumothorax 2018    Hx of emergency  section 2018     (spontaneous vaginal delivery) 2018    Hemoptysis 2018    Pulmonary disease 2018    RA (rheumatoid arthritis) ([facility] [address] ) 2018    Cervical spondylosis without myelopathy 2017    Hypokalemia 2017    Lumbar stenosis 2017    Hyponatremia 10/31/2016    Premature atrial contraction 2016    Scoliosis, or kyphoscoliosis, idiopathic 2016    Trochanteric bursitis of both hips 2015    Dyslipidemia 2014    Combined disorders of mitral, aortic and tricuspid valves 2014    Chronic obstructive pulmonary disease ([facility] ) 2013    Pulmonary artery hypertension ([facility] ) 2013    Pulmonary emphysema ([facility] ) 2013    Chronic kidney insufficiency 2012    Esophageal reflux 2012    Benign essential hypertension 2012    Hypothyroidism 2012    Osteoarthritis 2012    Spinal stenosis 2012    History of blood transfusion 1966     She  has a past surgical history that includes Appendectomy; Breast lumpectomy; Cataract extraction;  section; Anterior release vertebral body w/ posterior fusion; Hysterectomy; Lumbar laminectomy; NEUROPLASTY; Oophorectomy; Thoracoscopy w/ talc pleurodesis; and Tonsillectomy  Her family history includes Cancer in her family; Colon cancer in her mother; Diabetes in her maternal uncle and paternal aunt; Heart disease in her father; Hypertension in her mother; Rheum arthritis in her father; Stroke in her family  She  reports that she has never smoked  She has never used smokeless tobacco  She reports current alcohol use  She reports that she does not use drugs    Current Outpatient Medications   Medication Sig Dispense Refill    ALPRAZolam (XANAX) 0 25 mg tablet Take 1 tablet (0 25 mg total) by mouth 2 (two) times a day as needed for anxiety 180 tablet 0    ascorbic acid (VITAMIN C) 250 mg tablet Take 1 tablet by mouth daily      betamethasone valerate (VALISONE) 0 1 % cream Apply topically 2 (two) times a day (Patient not taking: Reported on 10/6/2020) 60 g 1    calcium-vitamin D (OSCAL-500) 500-400 MG-UNIT per tablet Take by mouth      CELEBREX 200 MG capsule TAKE 1 CAPSULE TWICE A  capsule 3    Cholecalciferol (VITAMIN D3) 2000 units capsule Take by mouth      coenzyme Q-10 100 MG capsule Take by mouth      Cranberry 400 MG CAPS Take by mouth      furosemide (LASIX) 20 mg tablet Take 1 tablet (20 mg total) by mouth daily 30 tablet 5    losartan (COZAAR) 50 mg tablet TAKE ONE-HALF (1/2) TABLET DAILY 45 tablet 4    montelukast (SINGULAIR) 10 mg tablet TAKE 1 TABLET DAILY 90 tablet 3    moxifloxacin (VIGAMOX) 0 5 % ophthalmic solution Apply to eye      mupirocin (BACTROBAN) 2 % cream Apply topically 3 (three) times a day 30 g 4    olopatadine HCl (PATADAY) 0 2 % opth drops Apply to eye      omeprazole (PRILOSEC) 20 mg delayed release capsule Take 20 mg by mouth      potassium chloride (K-DUR,KLOR-CON) 20 mEq tablet Take 1 tablet (20 mEq total) by mouth daily 30 tablet 5    PREMARIN 1 25 MG tablet TAKE 1 TABLET DAILY 90 tablet 3    pyridoxine (VITAMIN B6) 100 mg tablet Take 50 mg by mouth daily        SYNTHROID 112 MCG tablet Take 1 tablet (112 mcg total) by mouth daily 90 tablet 3    triamterene-hydrochlorothiazide (MAXZIDE-25) 37 5-25 mg per tablet TAKE 1 TABLET DAILY 90 tablet 4    Vitamin E 200 units TABS Take 1 tablet by mouth daily      Zinc Gluconate 15 MG TABS Take 1 tablet by mouth daily       No current facility-administered medications for this visit        Current Outpatient Medications on File Prior to Visit   Medication Sig    ALPRAZolam (XANAX) 0 25 mg tablet Take 1 tablet (0 25 mg total) by mouth 2 (two) times a day as needed for anxiety    ascorbic acid (VITAMIN C) 250 mg tablet Take 1 tablet by mouth daily    betamethasone valerate (VALISONE) 0 1 % cream Apply topically 2 (two) times a day (Patient not taking: Reported on 10/6/2020)    calcium-vitamin D (OSCAL-500) 500-400 MG-UNIT per tablet Take by mouth    CELEBREX 200 MG capsule TAKE 1 CAPSULE TWICE A DAY    Cholecalciferol (VITAMIN D3) 2000 units capsule Take by mouth    coenzyme Q-10 100 MG capsule Take by mouth    Cranberry 400 MG CAPS Take by mouth    losartan (COZAAR) 50 mg tablet TAKE ONE-HALF (1/2) TABLET DAILY    montelukast (SINGULAIR) 10 mg tablet TAKE 1 TABLET DAILY    moxifloxacin (VIGAMOX) 0 5 % ophthalmic solution Apply to eye    mupirocin (BACTROBAN) 2 % cream Apply topically 3 (three) times a day  olopatadine HCl (PATADAY) 0 2 % opth drops Apply to eye    omeprazole (PRILOSEC) 20 mg delayed release capsule Take 20 mg by mouth    PREMARIN 1 25 MG tablet TAKE 1 TABLET DAILY    pyridoxine (VITAMIN B6) 100 mg tablet Take 50 mg by mouth daily      SYNTHROID 112 MCG tablet Take 1 tablet (112 mcg total) by mouth daily    triamterene-hydrochlorothiazide (MAXZIDE-25) 37 5-25 mg per tablet TAKE 1 TABLET DAILY    Vitamin E 200 units TABS Take 1 tablet by mouth daily    Zinc Gluconate 15 MG TABS Take 1 tablet by mouth daily     No current facility-administered medications on file prior to visit  She is allergic to aspirin; codeine polt-chlorphen polt er; etanercept; lorazepam; morphine; oxycodone; penicillins; povidone; povidone iodine; and prednisone       Review of Systems   Constitutional: Negative for activity change, appetite change, chills and fever  HENT: Negative for congestion and rhinorrhea  Eyes: Negative for visual disturbance  Respiratory: Negative for chest tightness and shortness of breath  Cardiovascular: Negative for chest pain and palpitations  Gastrointestinal: Negative for abdominal pain, blood in stool, diarrhea, nausea and vomiting  Endocrine: Negative for polydipsia, polyphagia and polyuria  Genitourinary: Negative for dysuria, frequency and urgency  Musculoskeletal: Negative for gait problem  Skin: Negative for color change  Neurological: Negative for dizziness and headaches  Hematological: Does not bruise/bleed easily  Psychiatric/Behavioral: Negative for confusion and sleep disturbance  The patient is not nervous/anxious  Objective:      /79 (BP Location: Left arm, Patient Position: Sitting, Cuff Size: Standard)   Pulse 72   SpO2 93%          Physical Exam  Vitals signs reviewed  Constitutional:       General: She is not in acute distress  Appearance: She is well-developed and well-groomed     HENT:      Head: Normocephalic and atraumatic  Comments: Dry skin bilateral ear canals  Eyes:      General:         Right eye: No discharge  Left eye: No discharge  Conjunctiva/sclera: Conjunctivae normal       Pupils: Pupils are equal, round, and reactive to light  Neck:      Thyroid: No thyromegaly  Vascular: No carotid bruit  Cardiovascular:      Rate and Rhythm: Normal rate and regular rhythm  Heart sounds: Murmur present  Systolic murmur present with a grade of 2/6  No friction rub  No gallop  Pulmonary:      Effort: No respiratory distress  Breath sounds: Decreased breath sounds present  No wheezing, rhonchi or rales  Abdominal:      General: Bowel sounds are normal  There is no distension  Tenderness: There is no abdominal tenderness  Musculoskeletal:      Right lower le+ Pitting Edema present  Left lower le+ Pitting Edema present  Comments: Slightly kyphotic   Lymphadenopathy:      Cervical: No cervical adenopathy  Skin:     General: Skin is warm and dry  Neurological:      Mental Status: She is alert and oriented to person, place, and time  Psychiatric:         Mood and Affect: Mood normal          Behavior: Behavior normal  Behavior is cooperative  Cognition and Memory: Cognition normal          BMI Counseling: There is no height or weight on file to calculate BMI  The BMI is above normal  Nutrition recommendations include encouraging healthy choices of fruits and vegetables, consuming healthier snacks, increasing intake of lean protein and reducing intake of cholesterol

## 2021-01-06 ENCOUNTER — TELEPHONE (OUTPATIENT)
Dept: HEMATOLOGY ONCOLOGY | Facility: CLINIC | Age: 86
End: 2021-01-06

## 2021-01-06 NOTE — TELEPHONE ENCOUNTER
Called to confirm patient's appt with [facility] and to go [facility]  Patient said that, because of a lung infection,  she was instructed by her PCP to stay home to lessen the risk of her getting COVID  Patient would prefer for this visit to be virtual  Patient said that she has no access to a video platform and wished her visit to be a telephone call   Her appt has been converted to virtual

## 2021-01-08 ENCOUNTER — TELEMEDICINE (OUTPATIENT)
Dept: HEMATOLOGY ONCOLOGY | Facility: CLINIC | Age: 86
End: 2021-01-08
Payer: MEDICARE

## 2021-01-08 DIAGNOSIS — C91.10 CLL (CHRONIC LYMPHOCYTIC LEUKEMIA) (HCC): Primary | ICD-10-CM

## 2021-01-08 PROCEDURE — 99442 PR PHYS/QHP TELEPHONE EVALUATION 11-20 MIN: CPT | Performed by: NURSE PRACTITIONER

## 2021-01-08 NOTE — PROGRESS NOTES
Virtual Brief Visit    Assessment/Plan:    Problem List Items Addressed This Visit        Other    CLL (chronic lymphocytic leukemia) ([facility] [address] ) - Primary      Patient is an 77-year-old female with a history of stage 0 CLL, clinically stable  Patient preferred a virtual telephone visit secondary to the [place] pandemic  We reviewed her most recent blood work from October 2020 which reveals a white blood cell count of 19 29 and absolute lymphocyte count of 11 57  Both of which are within her established range between 17 and 20  Overall she feels well and is able to function without restriction  She does report increased edema in her bilateral lower extremities however this is likely attributed to her pulmonary hypertension verses CKD  She follows with her PCP and cardiologist on a regular basis  She states her PCP recently started her on Lasix for the increased edema  We will see patient in 6 months with repeat blood work  Patient verbalized understanding and is in agreement with the plan  Reason for visit is No chief complaint on file  Encounter provider ILEANA Sim    Provider located at [address] [place] [id number] [phone]    Recent Visits  Date Type Provider Dept   01/05/21 Office Visit Agustin Willard MD  Primary [facility] [place]   Showing recent visits within past 7 days and meeting all other requirements     Future Appointments  No visits were found meeting these conditions  Showing future appointments within next 150 days and meeting all other requirements        After connecting through telephone, the patient was identified by name and date of birth  Daisy Zeng was informed that this is a telemedicine visit and that the visit is being conducted through telephone  My office door was closed  No one else was in the room    She acknowledged consent and understanding of privacy and security of the platform  The patient has agreed to participate and understands she can discontinue the visit at any time  Patient is aware this is a billable service  Subjective    Inna Feng is a 80 y o  female with a history of stage 0 CLL, clinically stable    HPI     Past Medical History:   Diagnosis Date    Carpal tunnel syndrome     unspecified , lateraly ,   resolved 2014    Cubital tunnel syndrome on left     resolved 2014    Dyslipidemia     Ectopic heartbeats     resolved 2014    Hypertension     Malignant melanoma of skin ([address] )     resolved 2014    Premature atrial contraction     Transient ischemic attack     resolved 2014       Past Surgical History:   Procedure Laterality Date    ANTERIOR RELEASE VERTEBRAL BODY W/ POSTERIOR FUSION      APPENDECTOMY      BREAST LUMPECTOMY      CATARACT EXTRACTION       SECTION      HYSTERECTOMY      LUMBAR LAMINECTOMY      NEUROPLASTY      decompression medial nerve at carpal tunnel     OOPHORECTOMY      THORACOSCOPY W/ TALC PLEURODESIS      TONSILLECTOMY         Current Outpatient Medications   Medication Sig Dispense Refill    ALPRAZolam (XANAX) 0 25 mg tablet Take 1 tablet (0 25 mg total) by mouth 2 (two) times a day as needed for anxiety 180 tablet 0    ascorbic acid (VITAMIN C) 250 mg tablet Take 1 tablet by mouth daily      betamethasone valerate (VALISONE) 0 1 % cream Apply topically 2 (two) times a day (Patient not taking: Reported on 10/6/2020) 60 g 1    calcium-vitamin D (OSCAL-500) 500-400 MG-UNIT per tablet Take by mouth      CELEBREX 200 MG capsule TAKE 1 CAPSULE TWICE A  capsule 3    Cholecalciferol (VITAMIN D3) 2000 units capsule Take by mouth      coenzyme Q-10 100 MG capsule Take by mouth      Cranberry 400 MG CAPS Take by mouth      furosemide (LASIX) 20 mg tablet Take 1 tablet (20 mg total) by mouth daily 30 tablet 5    losartan (COZAAR) 50 mg tablet TAKE ONE-HALF (1/2) TABLET DAILY 45 tablet 4    montelukast (SINGULAIR) 10 mg tablet TAKE 1 TABLET DAILY 90 tablet 3    moxifloxacin (VIGAMOX) 0 5 % ophthalmic solution Apply to eye      mupirocin (BACTROBAN) 2 % cream Apply topically 3 (three) times a day 30 g 4    olopatadine HCl (PATADAY) 0 2 % opth drops Apply to eye      omeprazole (PRILOSEC) 20 mg delayed release capsule Take 20 mg by mouth      potassium chloride (K-DUR,KLOR-CON) 20 mEq tablet Take 1 tablet (20 mEq total) by mouth daily 30 tablet 5    PREMARIN 1 25 MG tablet TAKE 1 TABLET DAILY 90 tablet 3    pyridoxine (VITAMIN B6) 100 mg tablet Take 50 mg by mouth daily        SYNTHROID 112 MCG tablet Take 1 tablet (112 mcg total) by mouth daily 90 tablet 3    triamterene-hydrochlorothiazide (MAXZIDE-25) 37 5-25 mg per tablet TAKE 1 TABLET DAILY 90 tablet 4    Vitamin E 200 units TABS Take 1 tablet by mouth daily      Zinc Gluconate 15 MG TABS Take 1 tablet by mouth daily       No current facility-administered medications for this visit  Allergies   Allergen Reactions    Aspirin     Codeine Polt-Chlorphen Polt Er     Etanercept     Lorazepam     Morphine     Oxycodone     Penicillins     Povidone Hives     Shaky    Povidone Iodine     Prednisone        Review of Systems   Constitutional: Negative for activity change, appetite change, fatigue, fever and unexpected weight change  Respiratory: Negative for cough and shortness of breath  Cardiovascular: Negative for chest pain and leg swelling  Gastrointestinal: Negative for abdominal pain, constipation, diarrhea and nausea  Endocrine: Negative for cold intolerance and heat intolerance  Musculoskeletal: Negative for arthralgias and myalgias  Skin: Negative  Neurological: Negative for dizziness, weakness and headaches  Hematological: Negative for adenopathy  Does not bruise/bleed easily  There were no vitals filed for this visit        I spent Fifteen minutes directly with the patient during this visit    VIRTUAL VISIT DISCLAIMER    Mohini Ordoñez acknowledges that she has consented to an online visit or consultation  She understands that the online visit is based solely on information provided by her, and that, in the absence of a face-to-face physical evaluation by the physician, the diagnosis she receives is both limited and provisional in terms of accuracy and completeness  This is not intended to replace a full medical face-to-face evaluation by the physician  Moihni Ordoñez understands and accepts these terms

## 2021-01-20 ENCOUNTER — TELEPHONE (OUTPATIENT)
Dept: INTERNAL MEDICINE CLINIC | Facility: CLINIC | Age: 86
End: 2021-01-20

## 2021-01-20 NOTE — TELEPHONE ENCOUNTER
Spoke to patient patient stated that provider prescribed potassium capsules and patient is having a difficult time swallowing them and requested if there was a chewable or liquid form of the medication that she could be changed to that would be easier for her to take

## 2021-01-22 ENCOUNTER — TELEMEDICINE (OUTPATIENT)
Dept: CARDIOLOGY CLINIC | Facility: HOSPITAL | Age: 86
End: 2021-01-22
Payer: MEDICARE

## 2021-01-22 ENCOUNTER — TELEPHONE (OUTPATIENT)
Dept: INTERNAL MEDICINE CLINIC | Facility: CLINIC | Age: 86
End: 2021-01-22

## 2021-01-22 VITALS — SYSTOLIC BLOOD PRESSURE: 112 MMHG | DIASTOLIC BLOOD PRESSURE: 70 MMHG | BODY MASS INDEX: 25.1 KG/M2 | WEIGHT: 116 LBS

## 2021-01-22 DIAGNOSIS — I08.3 COMBINED DISORDERS OF MITRAL, AORTIC AND TRICUSPID VALVES: ICD-10-CM

## 2021-01-22 DIAGNOSIS — I27.21 PULMONARY ARTERY HYPERTENSION (HCC): ICD-10-CM

## 2021-01-22 DIAGNOSIS — I10 BENIGN ESSENTIAL HYPERTENSION: Primary | ICD-10-CM

## 2021-01-22 DIAGNOSIS — E78.5 DYSLIPIDEMIA: ICD-10-CM

## 2021-01-22 DIAGNOSIS — I49.1 PREMATURE ATRIAL CONTRACTION: ICD-10-CM

## 2021-01-22 PROCEDURE — 99442 PR PHYS/QHP TELEPHONE EVALUATION 11-20 MIN: CPT | Performed by: INTERNAL MEDICINE

## 2021-01-22 RX ORDER — LOSARTAN POTASSIUM 50 MG/1
TABLET ORAL
Qty: 45 TABLET | Refills: 3 | Status: SHIPPED | OUTPATIENT
Start: 2021-01-22 | End: 2021-09-29

## 2021-01-22 NOTE — PROGRESS NOTES
Virtual Regular Visit    Problem List Items Addressed This Visit        Cardiovascular and Mediastinum    Combined disorders of mitral, aortic and tricuspid valves    Relevant Orders    Echo complete with contrast if indicated    Benign essential hypertension - Primary    Premature atrial contraction    Pulmonary artery hypertension (HCC)    Relevant Orders    Echo complete with contrast if indicated       Other    Dyslipidemia        It was my intent to perform this visit via video technology but the patient was not able to do a video connection so the visit was completed via audio telephone only  Reason for visit is routine follow-up done via telephone due to [place] 19 outbreak  Encounter provider Olivia Campos DO    Provider located at [address] [place] [id number]      Recent Visits  No visits were found meeting these conditions  Showing recent visits within past 7 days and meeting all other requirements     Today's Visits  Date Type Provider Dept   01/22/21 [facility]medicine [facility]   Showing today's visits and meeting all other requirements     Future Appointments  No visits were found meeting these conditions  Showing future appointments within next 150 days and meeting all other requirements        The patient was identified by name and date of birth  Chinmay Sanabria was informed that this is a telemedicine visit and that the visit is being conducted through telephone  My office door was closed  No one else was in the room  She acknowledged consent and understanding of privacy and security of the video platform  The patient has agreed to participate and understands they can discontinue the visit at any time  Patient is aware this is a billable service  Discussion/Summary:   Mrs Ordoñez is a pleasant 51-year-old female who contacted via telephone for routine follow-up       She has persistently swollen lower extremities in the setting of her known pulmonary hypertension although her RV function appears normal   She has no other signs or symptoms of left-sided heart failure  Her PA pressure on her most recent echo is in the mid 40s as opposed to the 70s  She is taking Lasix 20 mg on a daily basis  We discussed a repeat echo although this will likely not   She remains intolerant of Imdur or which was prescribed for pulmonary hypertension which did seem to help her shortness of breath  She would like to undergo a repeat echo which she will schedule prior to her next visit       Her blood pressure control appears adequate when she takes it at home  Therefore no changes were made to her regimen       She underwent a repeat CT scan since her last visit with me which was markedly abnormal revealing extensive non emphysematous cystic lung disease greatest at the bases 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 per the radiologists read  She is under the care of a pulmonologist although she has not seen him in a while due to the coronavirus outbreak       I will see her back in the office in a few months for re-evaluation       Interval History:   Mrs Ordoñez is a pleasant 45-year-old female who was contacted via telephone for routine follow-up  She has had persistently swollen legs in the recent past   Her leg swelling is present upon wakening  She was having trouble putting on her shoes  She has now been taking Lasix 20 mg on a daily for the last few weeks as prescribed by her PCP  The swelling is improving and her shoes now fit  She denies any paroxysmal nocturnal dyspnea, orthopnea, acute weight gain or increasing abdominal girth  She abides by a salt-restricted diet  She denies any exertional shortness of breath with her day to day activities  She denies any exertional chest pain    She denies lightheadedness, syncope or presyncope    She denies palpitations or symptoms of claudication       Past Medical History:   Diagnosis Date    Carpal tunnel syndrome     unspecified , lateraly ,   resolved 2014    Cubital tunnel syndrome on left     resolved 2014    Dyslipidemia     Ectopic heartbeats     resolved 2014    Hypertension     Malignant melanoma of skin ([address] )     resolved 2014    Premature atrial contraction     Transient ischemic attack     resolved 2014       Past Surgical History:   Procedure Laterality Date    ANTERIOR RELEASE VERTEBRAL BODY W/ POSTERIOR FUSION      APPENDECTOMY      BREAST LUMPECTOMY      CATARACT EXTRACTION       SECTION      HYSTERECTOMY      LUMBAR LAMINECTOMY      NEUROPLASTY      decompression medial nerve at carpal tunnel     OOPHORECTOMY      THORACOSCOPY W/ TALC PLEURODESIS      TONSILLECTOMY         Current Outpatient Medications   Medication Sig Dispense Refill    ALPRAZolam (XANAX) 0 25 mg tablet Take 1 tablet (0 25 mg total) by mouth 2 (two) times a day as needed for anxiety 180 tablet 0    ascorbic acid (VITAMIN C) 250 mg tablet Take 1 tablet by mouth daily      betamethasone valerate (VALISONE) 0 1 % cream Apply topically 2 (two) times a day 60 g 1    calcium-vitamin D (OSCAL-500) 500-400 MG-UNIT per tablet Take by mouth      CELEBREX 200 MG capsule TAKE 1 CAPSULE TWICE A  capsule 3    Cholecalciferol (VITAMIN D3) 2000 units capsule Take by mouth      coenzyme Q-10 100 MG capsule Take by mouth      Cranberry 400 MG CAPS Take by mouth      furosemide (LASIX) 20 mg tablet Take 1 tablet (20 mg total) by mouth daily 30 tablet 5    losartan (COZAAR) 50 mg tablet TAKE ONE-HALF (1/2) TABLET DAILY 45 tablet 4    montelukast (SINGULAIR) 10 mg tablet TAKE 1 TABLET DAILY 90 tablet 3    moxifloxacin (VIGAMOX) 0 5 % ophthalmic solution Apply to eye      mupirocin (BACTROBAN) 2 % cream Apply topically 3 (three) times a day 30 g 4    olopatadine HCl (PATADAY) 0 2 % opth drops Apply to eye      omeprazole (PRILOSEC) 20 mg delayed release capsule Take 20 mg by mouth      PREMARIN 1 25 MG tablet TAKE 1 TABLET DAILY 90 tablet 3    pyridoxine (VITAMIN B6) 100 mg tablet Take 50 mg by mouth daily        SYNTHROID 112 MCG tablet Take 1 tablet (112 mcg total) by mouth daily 90 tablet 3    triamterene-hydrochlorothiazide (MAXZIDE-25) 37 5-25 mg per tablet TAKE 1 TABLET DAILY 90 tablet 4    Vitamin E 200 units TABS Take 1 tablet by mouth daily      Zinc Gluconate 15 MG TABS Take 1 tablet by mouth daily      potassium chloride (K-DUR,KLOR-CON) 20 mEq tablet Take 1 tablet (20 mEq total) by mouth daily (Patient not taking: Reported on 1/22/2021) 30 tablet 5     No current facility-administered medications for this visit  Allergies   Allergen Reactions    Aspirin     Codeine Polt-Chlorphen Polt Er     Etanercept     Lorazepam     Morphine     Oxycodone     Penicillins     Povidone Hives     Shaky    Povidone Iodine     Prednisone        Review of Systems   Respiratory: Positive for shortness of breath  Negative for choking and chest tightness  Cardiovascular: Positive for leg swelling  Negative for chest pain and palpitations  Musculoskeletal: Positive for arthralgias and back pain  I spent 20 minutes directly with the patient during this visit

## 2021-01-22 NOTE — TELEPHONE ENCOUNTER
Informed her about the Potassium liguid is not covered and very expensive  Informed her of taking tablets and breaking in half that they do resolve quickly  Stated she has capsules  She would like tablets sent to pharmacy    [facility] [place]

## 2021-01-23 DIAGNOSIS — E87.6 HYPOKALEMIA: Primary | ICD-10-CM

## 2021-01-23 RX ORDER — POTASSIUM CHLORIDE 20 MEQ/1
20 TABLET, EXTENDED RELEASE ORAL DAILY
Qty: 90 TABLET | Refills: 3 | Status: SHIPPED | OUTPATIENT
Start: 2021-01-23

## 2021-02-12 DIAGNOSIS — Z23 ENCOUNTER FOR IMMUNIZATION: ICD-10-CM

## 2021-02-23 DIAGNOSIS — F41.9 ANXIETY: ICD-10-CM

## 2021-02-23 RX ORDER — ALPRAZOLAM 0.25 MG/1
TABLET ORAL
Qty: 180 TABLET | Refills: 0 | Status: SHIPPED | OUTPATIENT
Start: 2021-02-23 | End: 2021-05-09

## 2021-02-25 ENCOUNTER — TELEPHONE (OUTPATIENT)
Dept: INTERNAL MEDICINE CLINIC | Facility: CLINIC | Age: 86
End: 2021-02-25

## 2021-03-01 DIAGNOSIS — I10 ESSENTIAL HYPERTENSION: ICD-10-CM

## 2021-03-02 RX ORDER — TRIAMTERENE AND HYDROCHLOROTHIAZIDE 37.5; 25 MG/1; MG/1
TABLET ORAL
Qty: 90 TABLET | Refills: 3 | Status: SHIPPED | OUTPATIENT
Start: 2021-03-02 | End: 2022-02-25

## 2021-03-16 ENCOUNTER — TELEPHONE (OUTPATIENT)
Dept: INTERNAL MEDICINE CLINIC | Facility: CLINIC | Age: 86
End: 2021-03-16

## 2021-03-19 DIAGNOSIS — E03.9 HYPOTHYROIDISM, UNSPECIFIED TYPE: ICD-10-CM

## 2021-03-19 RX ORDER — LEVOTHYROXINE SODIUM 112 MCG
TABLET ORAL
Qty: 90 TABLET | Refills: 3 | Status: SHIPPED | OUTPATIENT
Start: 2021-03-19 | End: 2022-02-25

## 2021-03-23 ENCOUNTER — OFFICE VISIT (OUTPATIENT)
Dept: INTERNAL MEDICINE CLINIC | Facility: CLINIC | Age: 86
End: 2021-03-23
Payer: MEDICARE

## 2021-03-23 VITALS
WEIGHT: 117 LBS | HEIGHT: 57 IN | HEART RATE: 98 BPM | OXYGEN SATURATION: 97 % | SYSTOLIC BLOOD PRESSURE: 166 MMHG | TEMPERATURE: 97.4 F | BODY MASS INDEX: 25.24 KG/M2 | DIASTOLIC BLOOD PRESSURE: 70 MMHG

## 2021-03-23 DIAGNOSIS — E78.5 DYSLIPIDEMIA: ICD-10-CM

## 2021-03-23 DIAGNOSIS — E03.9 HYPOTHYROIDISM, UNSPECIFIED TYPE: ICD-10-CM

## 2021-03-23 DIAGNOSIS — J44.9 CHRONIC OBSTRUCTIVE PULMONARY DISEASE, UNSPECIFIED COPD TYPE (HCC): ICD-10-CM

## 2021-03-23 DIAGNOSIS — M48.062 SPINAL STENOSIS OF LUMBAR REGION WITH NEUROGENIC CLAUDICATION: ICD-10-CM

## 2021-03-23 DIAGNOSIS — N18.30 STAGE 3 CHRONIC KIDNEY DISEASE, UNSPECIFIED WHETHER STAGE 3A OR 3B CKD (HCC): ICD-10-CM

## 2021-03-23 DIAGNOSIS — I10 BENIGN ESSENTIAL HYPERTENSION: Primary | ICD-10-CM

## 2021-03-23 DIAGNOSIS — Z00.00 MEDICARE ANNUAL WELLNESS VISIT, SUBSEQUENT: ICD-10-CM

## 2021-03-23 PROCEDURE — G0439 PPPS, SUBSEQ VISIT: HCPCS | Performed by: FAMILY MEDICINE

## 2021-03-23 PROCEDURE — 99214 OFFICE O/P EST MOD 30 MIN: CPT | Performed by: FAMILY MEDICINE

## 2021-03-23 PROCEDURE — 1123F ACP DISCUSS/DSCN MKR DOCD: CPT | Performed by: FAMILY MEDICINE

## 2021-03-23 NOTE — PROGRESS NOTES
Assessment/Plan:    No problem-specific Assessment & Plan notes found for this encounter  Diagnoses and all orders for this visit:    Benign essential hypertension  -     CBC and differential; Future  -     Comprehensive metabolic panel; Future    Chronic obstructive pulmonary disease, unspecified COPD type ([facility] [address] )    Hypothyroidism, unspecified type    Stage 3 chronic kidney disease, unspecified whether stage 3a or 3b CKD  -     CBC and differential; Future  -     Comprehensive metabolic panel; Future  -     Basic metabolic panel; Future    Dyslipidemia  -     Lipid panel; Future  -     TSH, 3rd generation; Future    Spinal stenosis of lumbar region with neurogenic claudication      orders and recommendations as noted above  Recent laboratory testing reviewed with her  TSH is relatively stable  Continue current dose of the levothyroxine  Will continue follow TSH with routine laboratory testing  Creatinine is significantly higher  Advised her to avoid the Celebrex if at all possible  Discussed the risks associated with this with her  Stay better hydrated  Avoid nephrotoxins  Continue follow-up with Nephrology as planned  Breathing has been relatively stable  She wishes to avoid inhalers if possible  Watch for any triggers  Blood pressure elevated initially today but did come down significantly and has been normal at home  She feels much of this is related to stress  Methods for stress sleep discussed  Continue with the losartan and triamterene/hydrochlorothiazide  Watch salt intake  Stay adequately hydrated  She declines cholesterol medication  Watch diet  Increase fiber in diet  Discussed with her continuing to use a cane especially with the spinal stenosis  Chronic back issues are worsening her ambulation  Advised her to be very careful to avoid falls  Discussed recent stressors for her at home    Advised her that she needs to delegate responsibility and not try to do everything herself  She declines immunizations  Declines the COVID vaccination  Declines bone density and mammogram   Will have her follow up in about 3 months or sooner if needed  Subjective:      Patient ID: Genet Caraballo is a 80 y o  female  She presents for routine follow-up as well as Medicare wellness  Continues to have a lot of pain especially into low back region  Can not really only sit basically straight up and has difficulty standing for any prolonged periods  Walking increasingly difficult because of the pain  Pain radiates from both sides of the low back into the buttocks area  Denies any numbness or tingling  She continues to make supper every night but she is trying to get her adult children, who live with her, to do more of the work around the house  Appetite has been generally good  Denies any nausea, vomiting, or diarrhea  Denies any changes in bowel movements  Even though appetite is generally good, she has noticed that this has decreased over the last few years  Worries more about her children especially since numerous of them have significant psychiatric issues  Has been following her blood pressure at home and it has been usually in 120s to 130s over 50s to 60s  Sometimes has been lower than that  Denies any chest pain or palpitations  No significant shortness of breath at present  The following portions of the patient's history were reviewed and updated as appropriate:   She  has a past medical history of Carpal tunnel syndrome, Cubital tunnel syndrome on left, Dyslipidemia, Ectopic heartbeats, Hypertension, Malignant melanoma of skin ([address] ), Premature atrial contraction, and Transient ischemic attack    She   Patient Active Problem List    Diagnosis Date Noted    Bilateral primary osteoarthritis of knee 10/06/2020    Low back pain 01/27/2020    Lumbosacral spondylosis without myelopathy 01/27/2020    Degeneration of intervertebral disc 01/27/2020    Cervical stenosis of spinal canal 2020    Arthritis of ankle 2020    Arthritis of right knee 2020    Peripheral edema 2019    Lung nodules 2019    Kyphosis 10/23/2018    Venous stasis 2018    CKD (chronic kidney disease) stage 3, GFR 30-59 ml/min 2018    CLL (chronic lymphocytic leukemia) ([facility] [address] ) 2018    History of thoracotomy 2018    History of D&C 2018    History of lumpectomy of right breast 2018    History of pneumothorax 2018    Hx of emergency  section 2018     (spontaneous vaginal delivery) 2018    Hemoptysis 2018    Pulmonary disease 2018    RA (rheumatoid arthritis) ([facility] [facility] ) 2018    Cervical spondylosis without myelopathy 2017    Hypokalemia 2017    Lumbar stenosis 2017    Hyponatremia 10/31/2016    Premature atrial contraction 2016    Scoliosis, or kyphoscoliosis, idiopathic 2016    Trochanteric bursitis of both hips 2015    Dyslipidemia 2014    Combined disorders of mitral, aortic and tricuspid valves 2014    Chronic obstructive pulmonary disease ([facility] [facility] ) 2013    Pulmonary artery hypertension ([facility] [address] ) 2013    [facility] 2013    Chronic kidney insufficiency 2012    Esophageal reflux 2012    Benign essential hypertension 2012    Hypothyroidism 2012    Osteoarthritis 2012    Spinal stenosis 2012    History of blood transfusion 1966     She  has a past surgical history that includes Appendectomy; Breast lumpectomy; Cataract extraction;  section; Anterior release vertebral body w/ posterior fusion; Hysterectomy; Lumbar laminectomy; NEUROPLASTY; Oophorectomy; Thoracoscopy w/ talc pleurodesis; and Tonsillectomy  Her family history includes Cancer in her family; Colon cancer in her mother; Diabetes in her maternal uncle and paternal aunt;  Heart disease in her father; Hypertension in her mother; Rheum arthritis in her father; Stroke in her family  She  reports that she has never smoked  She has never used smokeless tobacco  She reports current alcohol use  She reports that she does not use drugs    Current Outpatient Medications   Medication Sig Dispense Refill    ALPRAZolam (XANAX) 0 25 mg tablet TAKE 1 TABLET TWICE A DAY AS NEEDED FOR ANXIETY 180 tablet 0    ascorbic acid (VITAMIN C) 250 mg tablet Take 1 tablet by mouth daily      betamethasone valerate (VALISONE) 0 1 % cream Apply topically 2 (two) times a day 60 g 1    calcium-vitamin D (OSCAL-500) 500-400 MG-UNIT per tablet Take by mouth      CELEBREX 200 MG capsule TAKE 1 CAPSULE TWICE A  capsule 3    Cholecalciferol (VITAMIN D3) 2000 units capsule Take by mouth      coenzyme Q-10 100 MG capsule Take by mouth      Cranberry 400 MG CAPS Take by mouth      furosemide (LASIX) 20 mg tablet Take 1 tablet (20 mg total) by mouth daily 30 tablet 5    losartan (COZAAR) 50 mg tablet Take one half tablet orally daily 45 tablet 3    montelukast (SINGULAIR) 10 mg tablet TAKE 1 TABLET DAILY 90 tablet 3    moxifloxacin (VIGAMOX) 0 5 % ophthalmic solution Apply to eye      mupirocin (BACTROBAN) 2 % cream Apply topically 3 (three) times a day 30 g 4    olopatadine HCl (PATADAY) 0 2 % opth drops Apply to eye      omeprazole (PRILOSEC) 20 mg delayed release capsule Take 20 mg by mouth      potassium chloride (K-DUR,KLOR-CON) 20 mEq tablet Take 1 tablet (20 mEq total) by mouth daily 90 tablet 3    PREMARIN 1 25 MG tablet TAKE 1 TABLET DAILY 90 tablet 3    pyridoxine (VITAMIN B6) 100 mg tablet Take 50 mg by mouth daily        Synthroid 112 MCG tablet TAKE 1 TABLET DAILY 90 tablet 3    triamterene-hydrochlorothiazide (MAXZIDE-25) 37 5-25 mg per tablet TAKE 1 TABLET DAILY 90 tablet 3    Vitamin E 200 units TABS Take 1 tablet by mouth daily      Zinc Gluconate 15 MG TABS Take 1 tablet by mouth daily No current facility-administered medications for this visit  Current Outpatient Medications on File Prior to Visit   Medication Sig    ALPRAZolam (XANAX) 0 25 mg tablet TAKE 1 TABLET TWICE A DAY AS NEEDED FOR ANXIETY    ascorbic acid (VITAMIN C) 250 mg tablet Take 1 tablet by mouth daily    betamethasone valerate (VALISONE) 0 1 % cream Apply topically 2 (two) times a day    calcium-vitamin D (OSCAL-500) 500-400 MG-UNIT per tablet Take by mouth    CELEBREX 200 MG capsule TAKE 1 CAPSULE TWICE A DAY    Cholecalciferol (VITAMIN D3) 2000 units capsule Take by mouth    coenzyme Q-10 100 MG capsule Take by mouth    Cranberry 400 MG CAPS Take by mouth    furosemide (LASIX) 20 mg tablet Take 1 tablet (20 mg total) by mouth daily    losartan (COZAAR) 50 mg tablet Take one half tablet orally daily    montelukast (SINGULAIR) 10 mg tablet TAKE 1 TABLET DAILY    moxifloxacin (VIGAMOX) 0 5 % ophthalmic solution Apply to eye    mupirocin (BACTROBAN) 2 % cream Apply topically 3 (three) times a day    olopatadine HCl (PATADAY) 0 2 % opth drops Apply to eye    omeprazole (PRILOSEC) 20 mg delayed release capsule Take 20 mg by mouth    potassium chloride (K-DUR,KLOR-CON) 20 mEq tablet Take 1 tablet (20 mEq total) by mouth daily    PREMARIN 1 25 MG tablet TAKE 1 TABLET DAILY    pyridoxine (VITAMIN B6) 100 mg tablet Take 50 mg by mouth daily      Synthroid 112 MCG tablet TAKE 1 TABLET DAILY    triamterene-hydrochlorothiazide (MAXZIDE-25) 37 5-25 mg per tablet TAKE 1 TABLET DAILY    Vitamin E 200 units TABS Take 1 tablet by mouth daily    Zinc Gluconate 15 MG TABS Take 1 tablet by mouth daily     No current facility-administered medications on file prior to visit  She is allergic to aspirin; codeine polt-chlorphen polt er; etanercept; lorazepam; morphine; oxycodone; penicillins; povidone; povidone iodine; and prednisone       Review of Systems   Constitutional: Negative for activity change, appetite change, chills, fatigue and fever  HENT: Positive for congestion  Negative for rhinorrhea, sore throat, tinnitus and trouble swallowing  Eyes: Negative for visual disturbance  Respiratory: Negative for cough, chest tightness and shortness of breath  Cardiovascular: Negative for chest pain and palpitations  Gastrointestinal: Negative for abdominal pain, blood in stool, diarrhea, nausea and vomiting  Endocrine: Negative for polydipsia, polyphagia and polyuria  Genitourinary: Negative for dysuria, frequency and urgency  Musculoskeletal: Positive for arthralgias, back pain, gait problem, neck pain and neck stiffness  Skin: Negative for color change  Neurological: Negative for dizziness and headaches  Hematological: Does not bruise/bleed easily  Psychiatric/Behavioral: Negative for confusion and sleep disturbance  The patient is not nervous/anxious  Objective:      /70 (BP Location: Left arm, Patient Position: Sitting, Cuff Size: Standard)   Pulse 98   Temp (!) 97 4 °F (36 3 °C)   Ht 4' 9" (1 448 m)   Wt 53 1 kg (117 lb)   SpO2 97%   BMI 25 32 kg/m²          Physical Exam  Constitutional:       General: She is not in acute distress  Appearance: She is well-developed, well-groomed and normal weight  She is not ill-appearing  HENT:      Head: Normocephalic and atraumatic  Comments:   Slight cerumen  Eyes:      General: Lids are normal          Right eye: No discharge  Left eye: No discharge  Conjunctiva/sclera: Conjunctivae normal       Pupils: Pupils are equal, round, and reactive to light  Neck:      Thyroid: No thyromegaly  Cardiovascular:      Rate and Rhythm: Normal rate and regular rhythm  Heart sounds: Normal heart sounds  No murmur  No friction rub  No gallop  Pulmonary:      Effort: No respiratory distress  Breath sounds: No wheezing or rales  Abdominal:      General: Bowel sounds are normal  There is no distension  Tenderness: There is no abdominal tenderness  Lymphadenopathy:      Cervical: No cervical adenopathy  Skin:     General: Skin is warm and dry  Comments:  Venous stasis changes bilateral lower extremities   Neurological:      Mental Status: She is alert and oriented to person, place, and time  Psychiatric:         Mood and Affect: Mood and affect normal          Speech: Speech normal          Behavior: Behavior normal  Behavior is cooperative

## 2021-03-23 NOTE — PROGRESS NOTES
Assessment and Plan:     Problem List Items Addressed This Visit        Endocrine    Hypothyroidism       Respiratory    Chronic obstructive pulmonary disease ([facility] [address] )       Cardiovascular and Mediastinum    Benign essential hypertension - Primary    Relevant Orders    CBC and differential    Comprehensive metabolic panel       Genitourinary    CKD (chronic kidney disease) stage 3, GFR 30-59 ml/min    Relevant Orders    CBC and differential    Comprehensive metabolic panel    Basic metabolic panel       Other    Dyslipidemia    Relevant Orders    Lipid panel    TSH, 3rd generation    Lumbar stenosis      Other Visit Diagnoses     Medicare annual wellness visit, subsequent               Preventive health issues were discussed with patient, and age appropriate screening tests were ordered as noted in patient's After Visit Summary  Personalized health advice and appropriate referrals for health education or preventive services given if needed, as noted in patient's After Visit Summary       History of Present Illness:     Patient presents for Medicare Annual Wellness visit    Patient Care Team:  Roman Braswell MD as PCP - General  DO Linda Nicolas MD     Problem List:     Patient Active Problem List   Diagnosis    Cervical spondylosis without myelopathy    Chronic kidney insufficiency    Chronic obstructive pulmonary disease ([facility] [address] )    CKD (chronic kidney disease) stage 3, GFR 30-59 ml/min    CLL (chronic lymphocytic leukemia) ([address] )    Combined disorders of mitral, aortic and tricuspid valves    Dyslipidemia    Pulmonary emphysema ([address] )    Esophageal reflux    History of thoracotomy    History of blood transfusion    History of D&C    History of lumpectomy of right breast    History of pneumothorax    Hx of emergency  section     (spontaneous vaginal delivery)    Benign essential hypertension    Hypokalemia    Hyponatremia    Hypothyroidism    Hemoptysis  Lumbar stenosis    Pulmonary disease    RA (rheumatoid arthritis) (HCC)    Osteoarthritis    Premature atrial contraction    Pulmonary artery hypertension (HCC)    Scoliosis, or kyphoscoliosis, idiopathic    Spinal stenosis    Trochanteric bursitis of both hips    Venous stasis    Kyphosis    Lung nodules    Peripheral edema    Low back pain    Lumbosacral spondylosis without myelopathy    Degeneration of intervertebral disc    Cervical stenosis of spinal canal    Arthritis of ankle    Arthritis of right knee    Bilateral primary osteoarthritis of knee      Past Medical and Surgical History:     Past Medical History:   Diagnosis Date    Carpal tunnel syndrome     unspecified , lateraly ,   resolved 2014    Cubital tunnel syndrome on left     resolved 2014    Dyslipidemia     Ectopic heartbeats     resolved 2014    Hypertension     Malignant melanoma of skin ([facility] [address] )     resolved 2014    Premature atrial contraction     Transient ischemic attack     resolved 2014     Past Surgical History:   Procedure Laterality Date    ANTERIOR RELEASE VERTEBRAL BODY W/ POSTERIOR FUSION      APPENDECTOMY      BREAST LUMPECTOMY      CATARACT EXTRACTION       SECTION      HYSTERECTOMY      LUMBAR LAMINECTOMY      NEUROPLASTY      decompression medial nerve at carpal tunnel     OOPHORECTOMY      THORACOSCOPY W/ TALC PLEURODESIS      TONSILLECTOMY        Family History:     Family History   Problem Relation Age of Onset    Colon cancer Mother     Hypertension Mother     Heart disease Father     Rheum arthritis Father     Cancer Family     Stroke Family     Diabetes Maternal Uncle     Diabetes Paternal Aunt       Social History:     E-Cigarette/Vaping    E-Cigarette Use Never User      E-Cigarette/Vaping Substances    Nicotine No     THC No     CBD No     Flavoring No     Other No     Unknown No      Social History     Socioeconomic History    Marital status:       Spouse name: None    Number of children: None    Years of education: None    Highest education level: None   Occupational History    None   Social Needs    Financial resource strain: None    Food insecurity     Worry: None     Inability: None    Transportation needs     Medical: None     Non-medical: None   Tobacco Use    Smoking status: Never Smoker    Smokeless tobacco: Never Used   Substance and Sexual Activity    Alcohol use: Yes     Comment: social    Drug use: No    Sexual activity: None   Lifestyle    Physical activity     Days per week: None     Minutes per session: None    Stress: None   Relationships    Social connections     Talks on phone: None     Gets together: None     Attends Baptism service: None     Active member of club or organization: None     Attends meetings of clubs or organizations: None     Relationship status: None    Intimate partner violence     Fear of current or ex partner: None     Emotionally abused: None     Physically abused: None     Forced sexual activity: None   Other Topics Concern    None   Social History Narrative    None      Medications and Allergies:     Current Outpatient Medications   Medication Sig Dispense Refill    ALPRAZolam (XANAX) 0 25 mg tablet TAKE 1 TABLET TWICE A DAY AS NEEDED FOR ANXIETY 180 tablet 0    ascorbic acid (VITAMIN C) 250 mg tablet Take 1 tablet by mouth daily      betamethasone valerate (VALISONE) 0 1 % cream Apply topically 2 (two) times a day 60 g 1    calcium-vitamin D (OSCAL-500) 500-400 MG-UNIT per tablet Take by mouth      CELEBREX 200 MG capsule TAKE 1 CAPSULE TWICE A  capsule 3    Cholecalciferol (VITAMIN D3) 2000 units capsule Take by mouth      coenzyme Q-10 100 MG capsule Take by mouth      Cranberry 400 MG CAPS Take by mouth      furosemide (LASIX) 20 mg tablet Take 1 tablet (20 mg total) by mouth daily 30 tablet 5    losartan (COZAAR) 50 mg tablet Take one half tablet orally daily 45 tablet 3    montelukast (SINGULAIR) 10 mg tablet TAKE 1 TABLET DAILY 90 tablet 3    moxifloxacin (VIGAMOX) 0 5 % ophthalmic solution Apply to eye      mupirocin (BACTROBAN) 2 % cream Apply topically 3 (three) times a day 30 g 4    olopatadine HCl (PATADAY) 0 2 % opth drops Apply to eye      omeprazole (PRILOSEC) 20 mg delayed release capsule Take 20 mg by mouth      potassium chloride (K-DUR,KLOR-CON) 20 mEq tablet Take 1 tablet (20 mEq total) by mouth daily 90 tablet 3    PREMARIN 1 25 MG tablet TAKE 1 TABLET DAILY 90 tablet 3    pyridoxine (VITAMIN B6) 100 mg tablet Take 50 mg by mouth daily        Synthroid 112 MCG tablet TAKE 1 TABLET DAILY 90 tablet 3    triamterene-hydrochlorothiazide (MAXZIDE-25) 37 5-25 mg per tablet TAKE 1 TABLET DAILY 90 tablet 3    Vitamin E 200 units TABS Take 1 tablet by mouth daily      Zinc Gluconate 15 MG TABS Take 1 tablet by mouth daily       No current facility-administered medications for this visit  Allergies   Allergen Reactions    Aspirin     Codeine Polt-Chlorphen Polt Er     Etanercept     Lorazepam     Morphine     Oxycodone     Penicillins     Povidone Hives     Shaky    Povidone Iodine     Prednisone       Immunizations:     Immunization History   Administered Date(s) Administered    INFLUENZA 10/31/2016    Influenza Split High Dose Preservative Free IM 10/31/2016    Pneumococcal Conjugate 13-Valent 10/31/2016    Pneumococcal Polysaccharide PPV23 10/31/2016    Tdap 10/31/2016      Health Maintenance: There are no preventive care reminders to display for this patient  Topic Date Due    Influenza Vaccine (1) 09/01/2020      Medicare Health Risk Assessment:     /70 (BP Location: Left arm, Patient Position: Sitting, Cuff Size: Standard)   Pulse 98   Temp (!) 97 4 °F (36 3 °C)   Ht 4' 9" (1 448 m)   Wt 53 1 kg (117 lb)   SpO2 97%   BMI 25 32 kg/m²      Monahan Moment is here for her Subsequent Wellness visit   Last Medicare Wellness visit information reviewed, patient interviewed and updates made to the record today  Health Risk Assessment:   Patient rates overall health as fair  Patient feels that their physical health rating is slightly worse  Patient is satisfied with their life  Eyesight was rated as same  Hearing was rated as same  Patient feels that their emotional and mental health rating is same  Patients states they are never, rarely angry  Patient states they are often unusually tired/fatigued  Pain experienced in the last 7 days has been a lot  Patient's pain rating has been 10/10  Patient states that she has experienced no weight loss or gain in last 6 months  Depression Screening:   PHQ-2 Score: 0      Fall Risk Screening: In the past year, patient has experienced: no history of falling in past year      Urinary Incontinence Screening:   Patient has not leaked urine accidently in the last six months  Home Safety:  Patient has trouble with stairs inside or outside of their home  Patient has working smoke alarms and has working carbon monoxide detector  Home safety hazards include: loose rugs on the floor  Nutrition:   Current diet is Regular  Medications:   Patient is currently taking over-the-counter supplements  OTC medications include: see medication list  Patient is able to manage medications  Activities of Daily Living (ADLs)/Instrumental Activities of Daily Living (IADLs):   Walk and transfer into and out of bed and chair?: Yes  Dress and groom yourself?: Yes    Bathe or shower yourself?: Yes    Feed yourself? Yes  Do your laundry/housekeeping?: Yes  Manage your money, pay your bills and track your expenses?: Yes  Make your own meals?: Yes    Do your own shopping?: No    Previous Hospitalizations:   Any hospitalizations or ED visits within the last 12 months?: No      Advance Care Planning:   Living will: Yes    Durable POA for healthcare:  Yes    Advanced directive: Yes    Provider agrees with end of life decisions: Yes      Cognitive Screening:   Provider or family/friend/caregiver concerned regarding cognition?: No    PREVENTIVE SCREENINGS      Cardiovascular Screening:    General: Screening Current      Diabetes Screening:     General: Screening Current      Colorectal Cancer Screening:     General: Screening Not Indicated and Patient Declines      Breast Cancer Screening:     General: Patient Declines      Cervical Cancer Screening:    General: Screening Not Indicated      Osteoporosis Screening:    General: Patient Declines      Abdominal Aortic Aneurysm (AAA) Screening:        General: Screening Not Indicated      Lung Cancer Screening:     General: Screening Not Indicated      Hepatitis C Screening:    General: Screening Not Indicated    Screening, Brief Intervention, and Referral to Treatment (SBIRT)    Screening  Typical number of drinks in a day: 0  Typical number of drinks in a week: 0  Interpretation: Low risk drinking behavior  Single Item Drug Screening:  How often have you used an illegal drug (including marijuana) or a prescription medication for non-medical reasons in the past year? never    Single Item Drug Screen Score: 0  Interpretation: Negative screen for possible drug use disorder    Brief Intervention  Alcohol & drug use screenings were reviewed  No concerns regarding substance use disorder identified         Sam Deras MD

## 2021-03-23 NOTE — PATIENT INSTRUCTIONS
Medicare Preventive Visit Patient Instructions  Thank you for completing your Welcome to Medicare Visit or Medicare Annual Wellness Visit today  Your next wellness visit will be due in one year (3/24/2022)  The screening/preventive services that you may require over the next 5-10 years are detailed below  Some tests may not apply to you based off risk factors and/or age  Screening tests ordered at today's visit but not completed yet may show as past due  Also, please note that scanned in results may not display below  Preventive Screenings:  Service Recommendations Previous Testing/Comments   Colorectal Cancer Screening  * Colonoscopy    * Fecal Occult Blood Test (FOBT)/Fecal Immunochemical Test (FIT)  * Fecal DNA/Cologuard Test  * Flexible Sigmoidoscopy Age: 54-65 years old   Colonoscopy: every 10 years (may be performed more frequently if at higher risk)  OR  FOBT/FIT: every 1 year  OR  Cologuard: every 3 years  OR  Sigmoidoscopy: every 5 years  Screening may be recommended earlier than age 48 if at higher risk for colorectal cancer  Also, an individualized decision between you and your healthcare provider will decide whether screening between the ages of 74-80 would be appropriate  Colonoscopy: 07/12/1999  FOBT/FIT: Not on file  Cologuard: Not on file  Sigmoidoscopy: Not on file    Screening Not Indicated     Breast Cancer Screening Age: 36 years old  Frequency: every 1-2 years  Not required if history of left and right mastectomy Mammogram: 02/23/2012        Cervical Cancer Screening Between the ages of 21-29, pap smear recommended once every 3 years  Between the ages of 33-67, can perform pap smear with HPV co-testing every 5 years     Recommendations may differ for women with a history of total hysterectomy, cervical cancer, or abnormal pap smears in past  Pap Smear: Not on file    Screening Not Indicated   Hepatitis C Screening Once for adults born between 1945 and 1965  More frequently in patients at high risk for Hepatitis C Hep C Antibody: Not on file        Diabetes Screening 1-2 times per year if you're at risk for diabetes or have pre-diabetes Fasting glucose: 82 mg/dL   A1C: No results in last 5 years    Screening Current   Cholesterol Screening Once every 5 years if you don't have a lipid disorder  May order more often based on risk factors  Lipid panel: 03/22/2021    Screening Current     Other Preventive Screenings Covered by Medicare:  1  Abdominal Aortic Aneurysm (AAA) Screening: covered once if your at risk  You're considered to be at risk if you have a family history of AAA  2  Lung Cancer Screening: covers low dose CT scan once per year if you meet all of the following conditions: (1) Age 50-69; (2) No signs or symptoms of lung cancer; (3) Current smoker or have quit smoking within the last 15 years; (4) You have a tobacco smoking history of at least 30 pack years (packs per day multiplied by number of years you smoked); (5) You get a written order from a healthcare provider  3  Glaucoma Screening: covered annually if you're considered high risk: (1) You have diabetes OR (2) Family history of glaucoma OR (3)  aged 48 and older OR (3)  American aged 72 and older  3  Osteoporosis Screening: covered every 2 years if you meet one of the following conditions: (1) You're estrogen deficient and at risk for osteoporosis based off medical history and other findings; (2) Have a vertebral abnormality; (3) On glucocorticoid therapy for more than 3 months; (4) Have primary hyperparathyroidism; (5) On osteoporosis medications and need to assess response to drug therapy  · Last bone density test (DXA Scan): Not on file  5  HIV Screening: covered annually if you're between the age of 12-76  Also covered annually if you are younger than 13 and older than 72 with risk factors for HIV infection   For pregnant patients, it is covered up to 3 times per pregnancy  Immunizations:  Immunization Recommendations   Influenza Vaccine Annual influenza vaccination during flu season is recommended for all persons aged >= 6 months who do not have contraindications   Pneumococcal Vaccine (Prevnar and Pneumovax)  * Prevnar = PCV13  * Pneumovax = PPSV23   Adults 25-60 years old: 1-3 doses may be recommended based on certain risk factors  Adults 72 years old: Prevnar (PCV13) vaccine recommended followed by Pneumovax (PPSV23) vaccine  If already received PPSV23 since turning 65, then PCV13 recommended at least one year after PPSV23 dose  Hepatitis B Vaccine 3 dose series if at intermediate or high risk (ex: diabetes, end stage renal disease, liver disease)   Tetanus (Td) Vaccine - COST NOT COVERED BY MEDICARE PART B Following completion of primary series, a booster dose should be given every 10 years to maintain immunity against tetanus  Td may also be given as tetanus wound prophylaxis  Tdap Vaccine - COST NOT COVERED BY MEDICARE PART B Recommended at least once for all adults  For pregnant patients, recommended with each pregnancy  Shingles Vaccine (Shingrix) - COST NOT COVERED BY MEDICARE PART B  2 shot series recommended in those aged 48 and above     Health Maintenance Due:  There are no preventive care reminders to display for this patient  Immunizations Due:      Topic Date Due    Influenza Vaccine (1) 09/01/2020     Advance Directives   What are advance directives? Advance directives are legal documents that state your wishes and plans for medical care  These plans are made ahead of time in case you lose your ability to make decisions for yourself  Advance directives can apply to any medical decision, such as the treatments you want, and if you want to donate organs  What are the types of advance directives? There are many types of advance directives, and each state has rules about how to use them   You may choose a combination of any of the following:  · Living will: This is a written record of the treatment you want  You can also choose which treatments you do not want, which to limit, and which to stop at a certain time  This includes surgery, medicine, IV fluid, and tube feedings  · Durable power of  for healthcare [place] [facility]): This is a written record that states who you want to make healthcare choices for you when you are unable to make them for yourself  This person, called a proxy, is usually a family member or a friend  You may choose more than 1 proxy  · Do not resuscitate (DNR) order:  A DNR order is used in case your heart stops beating or you stop breathing  It is a request not to have certain forms of treatment, such as CPR  A DNR order may be included in other types of advance directives  · Medical directive: This covers the care that you want if you are in a coma, near death, or unable to make decisions for yourself  You can list the treatments you want for each condition  Treatment may include pain medicine, surgery, blood transfusions, dialysis, IV or tube feedings, and a ventilator (breathing machine)  · Values history: This document has questions about your views, beliefs, and how you feel and think about life  This information can help others choose the care that you would choose  Why are advance directives important? An advance directive helps you control your care  Although spoken wishes may be used, it is better to have your wishes written down  Spoken wishes can be misunderstood, or not followed  Treatments may be given even if you do not want them  An advance directive may make it easier for your family to make difficult choices about your care  Weight Management   Why it is important to manage your weight:  Being overweight increases your risk of health conditions such as heart disease, high blood pressure, type 2 diabetes, and certain types of cancer   It can also increase your risk for osteoarthritis, sleep apnea, and other respiratory problems  Aim for a slow, steady weight loss  Even a small amount of weight loss can lower your risk of health problems  How to lose weight safely:  A safe and healthy way to lose weight is to eat fewer calories and get regular exercise  You can lose up about 1 pound a week by decreasing the number of calories you eat by 500 calories each day  Healthy meal plan for weight management:  A healthy meal plan includes a variety of foods, contains fewer calories, and helps you stay healthy  A healthy meal plan includes the following:  · Eat whole-grain foods more often  A healthy meal plan should contain fiber  Fiber is the part of grains, fruits, and vegetables that is not broken down by your body  Whole-grain foods are healthy and provide extra fiber in your diet  Some examples of whole-grain foods are whole-wheat breads and pastas, oatmeal, brown rice, and bulgur  · Eat a variety of vegetables every day  Include dark, leafy greens such as spinach, kale, padmini greens, and mustard greens  Eat yellow and orange vegetables such as carrots, sweet potatoes, and winter squash  · Eat a variety of fruits every day  Choose fresh or canned fruit (canned in its own juice or light syrup) instead of juice  Fruit juice has very little or no fiber  · Eat low-fat dairy foods  Drink fat-free (skim) milk or 1% milk  Eat fat-free yogurt and low-fat cottage cheese  Try low-fat cheeses such as mozzarella and other reduced-fat cheeses  · Choose meat and other protein foods that are low in fat  Choose beans or other legumes such as split peas or lentils  Choose fish, skinless poultry (chicken or turkey), or lean cuts of red meat (beef or pork)  Before you cook meat or poultry, cut off any visible fat  · Use less fat and oil  Try baking foods instead of frying them  Add less fat, such as margarine, sour cream, regular salad dressing and mayonnaise to foods  Eat fewer high-fat foods   Some examples of high-fat foods include french fries, doughnuts, ice cream, and cakes  · Eat fewer sweets  Limit foods and drinks that are high in sugar  This includes candy, cookies, regular soda, and sweetened drinks  Exercise:  Exercise at least 30 minutes per day on most days of the week  Some examples of exercise include walking, biking, dancing, and swimming  You can also fit in more physical activity by taking the stairs instead of the elevator or parking farther away from stores  Ask your healthcare provider about the best exercise plan for you  © Copyright [employer] 2018 Information is for End User's use only and may not be sold, redistributed or otherwise used for commercial purposes   All illustrations and images included in CareNotes® are the copyrighted property of A D A M , [employer]  or [address] [place], [place] [address]fe zaria

## 2021-03-24 ENCOUNTER — TELEPHONE (OUTPATIENT)
Dept: INTERNAL MEDICINE CLINIC | Facility: CLINIC | Age: 86
End: 2021-03-24

## 2021-04-18 DIAGNOSIS — Z78.0 POSTMENOPAUSAL: ICD-10-CM

## 2021-04-18 RX ORDER — ESTROGENS, CONJUGATED 1.25 MG
TABLET ORAL
Qty: 90 TABLET | Refills: 3 | Status: SHIPPED | OUTPATIENT
Start: 2021-04-18 | End: 2022-04-13

## 2021-05-06 ENCOUNTER — TELEPHONE (OUTPATIENT)
Dept: HEMATOLOGY ONCOLOGY | Facility: CLINIC | Age: 86
End: 2021-05-06

## 2021-05-06 DIAGNOSIS — M06.9 RHEUMATOID ARTHRITIS INVOLVING MULTIPLE SITES (HCC): ICD-10-CM

## 2021-05-06 NOTE — TELEPHONE ENCOUNTER
Patient advised that Damon Vergara Irwin will be out of the office on 7/9/2021, therefore she must reschedule her follow appointment  Patient has been rescheduled to Friday 7/2/2021 @ 3:00pm in the [place] office  Patient prefers afternoon appointments  Patient verbalized understanding and agreement

## 2021-05-08 DIAGNOSIS — F41.9 ANXIETY: ICD-10-CM

## 2021-05-09 RX ORDER — ALPRAZOLAM 0.25 MG/1
TABLET ORAL
Qty: 180 TABLET | Refills: 0 | Status: SHIPPED | OUTPATIENT
Start: 2021-05-09

## 2021-06-30 ENCOUNTER — TELEPHONE (OUTPATIENT)
Dept: INTERNAL MEDICINE CLINIC | Facility: CLINIC | Age: 86
End: 2021-06-30

## 2021-06-30 NOTE — TELEPHONE ENCOUNTER
Kylie Ruano did reminder call and and dtr stated no one came to do labs  Lab order was sent to [facility] labs on 5/4/21  Previous appt was RS  Dtr statedmother is having a hard time walking  I faxed labs to [facility] again

## 2021-08-06 ENCOUNTER — TELEPHONE (OUTPATIENT)
Dept: INTERNAL MEDICINE CLINIC | Facility: CLINIC | Age: 86
End: 2021-08-06

## 2021-08-06 ENCOUNTER — NURSE TRIAGE (OUTPATIENT)
Dept: OTHER | Facility: OTHER | Age: 86
End: 2021-08-06

## 2021-08-06 DIAGNOSIS — L03.90 CELLULITIS, UNSPECIFIED CELLULITIS SITE: Primary | ICD-10-CM

## 2021-08-06 RX ORDER — DOXYCYCLINE 100 MG/1
100 CAPSULE ORAL 2 TIMES DAILY
Qty: 20 CAPSULE | Refills: 0 | Status: SHIPPED | OUTPATIENT
Start: 2021-08-06 | End: 2021-08-16

## 2021-08-06 NOTE — TELEPHONE ENCOUNTER
Patient called requesting z-pack for her legs  They are swelling due to edema but the one is red and the spot that was oozing last time is now open again  She is requesting you to call medications in for her  After consulting you I told patient, as instructed, that she must be seen, go to , cannot prescribe any medication unless seen  Patient said that she will wait until Monday  I told her she should not wait that long if the spot is open  Patient said that she's too sick to go there so she'll wait until Monday  I called and informed patient, as instructed, that you will call a medication in to [facility] for her but that she must be seen in our office next week and I will call her Monday with that appointment  Patient understood

## 2021-08-07 NOTE — TELEPHONE ENCOUNTER
Reason for Disposition   [1] Caller has URGENT medicine question about med that PCP or specialist prescribed AND [2] triager unable to answer question    Answer Assessment - Initial Assessment Questions  1  NAME of MEDICATION: "What medicine are you calling about?"      Antibiotic    2  QUESTION: "What is your question?" (e g , medication refill, side effect)      Not at Pharmacy    3  PRESCRIBING HCP: "Who prescribed it?" Reason: if prescribed by specialist, call should be referred to that group        Karina Bauman    Protocols used: MEDICATION QUESTION CALL-ADULT-

## 2021-08-07 NOTE — TELEPHONE ENCOUNTER
Regarding: Cellulitis Antibiotic  ----- Message from Tennis Klippel sent at 8/6/2021  8:14 PM EDT -----  "My mom spoke with the office today, and she was supposed to get a prescription erythromycin for cellulitis and it wasn't called in "

## 2021-08-09 ENCOUNTER — TELEPHONE (OUTPATIENT)
Dept: INTERNAL MEDICINE CLINIC | Facility: CLINIC | Age: 86
End: 2021-08-09

## 2021-08-09 NOTE — TELEPHONE ENCOUNTER
After consulting you about patient's swelling legs and open areas on both legs I called script to [facility] as instructed: Z-pack take as directed  No refills  I gave an appointment to patient 8/12 with you  When I called patient she said that she had stopped the lasix because her Bps were too low and she didn't feel right  Now she began the lasix again along with the maxzide

## 2021-08-18 ENCOUNTER — TELEMEDICINE (OUTPATIENT)
Dept: INTERNAL MEDICINE CLINIC | Facility: CLINIC | Age: 86
End: 2021-08-18
Payer: MEDICARE

## 2021-08-18 DIAGNOSIS — I10 BENIGN ESSENTIAL HYPERTENSION: Primary | ICD-10-CM

## 2021-08-18 DIAGNOSIS — I27.21 PULMONARY ARTERY HYPERTENSION (HCC): ICD-10-CM

## 2021-08-18 DIAGNOSIS — M41.20 SCOLIOSIS, OR KYPHOSCOLIOSIS, IDIOPATHIC: ICD-10-CM

## 2021-08-18 DIAGNOSIS — M06.9 RHEUMATOID ARTHRITIS INVOLVING MULTIPLE SITES, UNSPECIFIED WHETHER RHEUMATOID FACTOR PRESENT (HCC): ICD-10-CM

## 2021-08-18 DIAGNOSIS — C91.10 CLL (CHRONIC LYMPHOCYTIC LEUKEMIA) (HCC): ICD-10-CM

## 2021-08-18 DIAGNOSIS — J43.9 PULMONARY EMPHYSEMA, UNSPECIFIED EMPHYSEMA TYPE (HCC): ICD-10-CM

## 2021-08-18 DIAGNOSIS — N18.30 STAGE 3 CHRONIC KIDNEY DISEASE, UNSPECIFIED WHETHER STAGE 3A OR 3B CKD (HCC): ICD-10-CM

## 2021-08-18 DIAGNOSIS — E03.9 HYPOTHYROIDISM, UNSPECIFIED TYPE: ICD-10-CM

## 2021-08-18 PROCEDURE — 99214 OFFICE O/P EST MOD 30 MIN: CPT | Performed by: FAMILY MEDICINE

## 2021-08-18 NOTE — PROGRESS NOTES
Virtual Regular Visit    Verification of patient location:    Patient is located in the following state in which I hold an active license [place]      Assessment/Plan:    Problem List Items Addressed This Visit        Endocrine    Hypothyroidism    Relevant Orders    Lipid panel    TSH, 3rd generation       Respiratory    Pulmonary emphysema ([address] )    Relevant Medications    budesonide (Rhinocort Allergy) 32 MCG/ACT nasal spray       Cardiovascular and Mediastinum    Benign essential hypertension - Primary    Relevant Orders    CBC and differential    Comprehensive metabolic panel    Lipid panel    Pulmonary artery hypertension (HCC)       Musculoskeletal and Integument    RA (rheumatoid arthritis) (HCC)    Scoliosis, or kyphoscoliosis, idiopathic       Genitourinary    CKD (chronic kidney disease) stage 3, GFR 30-59 ml/min (HCC)    Relevant Orders    CBC and differential    Comprehensive metabolic panel       Other    CLL (chronic lymphocytic leukemia) (HCC)    Relevant Orders    CBC and differential         orders and recommendations as noted above  Blood pressure readings at home over the past few weeks reviewed  These have improved somewhat since discontinuing the losartan  Would remain off of the losartan since she was having significant episodes of hypotension and orthostasis  Stay adequately hydrated  Continue follow blood pressure at home  Watch salt intake  Will repeat laboratory testing as noted above  She did have some difficulty with the home draw so she is planning to have a family member transport her to the hospital for laboratory testing  Continue be very careful to avoid falls especially with the underlying back issues and rheumatoid arthritis  She has been trying to avoid nephrotoxins but continues with the Celebrex about once a day since she really cannot function from a pain standpoint without this  Continue follow-up with Hematology for the CLL    Will recheck a CBC with upcoming laboratory testing  Recent stressors at home discussed  She declines immunizations  Coronavirus precautions discussed  Will have her follow, hopefully in the office, in approximately 4-6 weeks or sooner if needed  Reason for visit is   Chief Complaint   Patient presents with    Virtual Regular Visit     c/o BP running very low  Stopped taking losartan   Leg Swelling     c/o edema and sores    Virtual Regular Visit        Encounter provider Nitin Shultz MD    Provider located at [address] [place] [address]  [address] [id number]      Recent Visits  Date Type Provider Dept   08/18/21 Jeny Mayes MD  [facility]   Showing recent visits within past 7 days and meeting all other requirements  Future Appointments  No visits were found meeting these conditions  Showing future appointments within next 150 days and meeting all other requirements       The patient was identified by name and date of birth  Mckayla Ceja was informed that this is a telemedicine visit and that the visit is being conducted through [address] [place] [address] and patient was informed that this is a secure, HIPAA-compliant platform  She agrees to proceed     My office door was closed  No one else was in the room  She acknowledged consent and understanding of privacy and security of the video platform  The patient has agreed to participate and understands they can discontinue the visit at any time  Patient is aware this is a billable service  Subjective  Mckayla Ceja is a 80 y o  female   Evaluated initially via video visit and then via telephone since video visit  Could no longer be maintained   She presents for follow-up  She initially was of video visit but this was unable to be maintained  Subsequently visit was completed via telephone    She began over the last few months with increasing fatigue, decreasing stamina, and worsening functional status  Had noticed that she was unable to do her normal activities  She did begin following her blood pressure and had noted that it was running significantly low at home often in the 25U systolic  She had subsequently stopped her losartan and has noticed an improvement  Is able to do most of her normal activities again  Blood pressure is ranging in the low 100s to approximately 766K systolic and 02H to 14Z diastolic  Has been feeling significantly better  Energy level has improved  Some persistent shortness of breath but improved  Continues with significant pain into her back and neck area  This limits her activities more than anything else at this point  Continues with the Celebrex but tries to limit to 1 a day if possible  Denies any recent falls  Appetite has remained very good  Denies any nausea, vomiting, or diarrhea  Denies any chest pain or palpitations  Usually sleeps relatively well  Does have some stressors at home with some issues with her adult children  Has been trying to deal with these relatively well         Past Medical History:   Diagnosis Date    Carpal tunnel syndrome     unspecified , lateraly ,   resolved 2014    Cubital tunnel syndrome on left     resolved 2014    Dyslipidemia     Ectopic heartbeats     resolved 2014    Hypertension     Malignant melanoma of skin ([facility] )     resolved 2014    Premature atrial contraction     Transient ischemic attack     resolved 2014       Past Surgical History:   Procedure Laterality Date    ANTERIOR RELEASE VERTEBRAL BODY W/ POSTERIOR FUSION      APPENDECTOMY      BREAST LUMPECTOMY      CATARACT EXTRACTION       SECTION      HYSTERECTOMY      LUMBAR LAMINECTOMY      NEUROPLASTY      decompression medial nerve at carpal tunnel     OOPHORECTOMY      THORACOSCOPY W/ TALC PLEURODESIS      TONSILLECTOMY         Current Outpatient Medications   Medication Sig Dispense Refill    ALPRAZolam (XANAX) 0 25 mg tablet TAKE 1 TABLET TWICE A DAY AS NEEDED FOR ANXIETY 180 tablet 0    ascorbic acid (VITAMIN C) 250 mg tablet Take 1 tablet by mouth daily      budesonide (Rhinocort Allergy) 32 MCG/ACT nasal spray Rhinocort Aqua 32 mcg/actuation nasal spray      calcium-vitamin D (OSCAL-500) 500-400 MG-UNIT per tablet Take by mouth      CeleBREX 200 MG capsule TAKE 1 CAPSULE TWICE A  capsule 3    Cholecalciferol (VITAMIN D3) 2000 units capsule Take by mouth      coenzyme Q-10 100 MG capsule Take by mouth      Cranberry 400 MG CAPS Take by mouth      furosemide (LASIX) 20 mg tablet Take 1 tablet (20 mg total) by mouth daily 30 tablet 5    montelukast (SINGULAIR) 10 mg tablet TAKE 1 TABLET DAILY 90 tablet 3    moxifloxacin (VIGAMOX) 0 5 % ophthalmic solution Apply to eye      olopatadine HCl (PATADAY) 0 2 % opth drops Apply to eye      omeprazole (PRILOSEC) 20 mg delayed release capsule Take 20 mg by mouth      potassium chloride (K-DUR,KLOR-CON) 20 mEq tablet Take 1 tablet (20 mEq total) by mouth daily 90 tablet 3    Premarin 1 25 MG tablet TAKE 1 TABLET DAILY 90 tablet 3    pyridoxine (VITAMIN B6) 100 mg tablet Take 50 mg by mouth daily        Synthroid 112 MCG tablet TAKE 1 TABLET DAILY 90 tablet 3    triamterene-hydrochlorothiazide (MAXZIDE-25) 37 5-25 mg per tablet TAKE 1 TABLET DAILY 90 tablet 3    Vitamin E 200 units TABS Take 1 tablet by mouth daily      Zinc Gluconate 15 MG TABS Take 1 tablet by mouth daily      betamethasone valerate (VALISONE) 0 1 % cream Apply topically 2 (two) times a day (Patient not taking: Reported on 8/18/2021) 60 g 1    losartan (COZAAR) 50 mg tablet Take one half tablet orally daily (Patient not taking: Reported on 8/18/2021) 45 tablet 3    mupirocin (BACTROBAN) 2 % cream Apply topically 3 (three) times a day (Patient not taking: Reported on 8/18/2021) 30 g 4     No current facility-administered medications for this visit  Allergies   Allergen Reactions    Aspirin     Codeine Polt-Chlorphen Polt Er     Etanercept     Lorazepam     Morphine     Oxycodone     Penicillins     Povidone Hives     Shaky    Povidone Iodine     Prednisone        Review of Systems   Constitutional: Positive for activity change and fatigue  Negative for appetite change, chills and fever  HENT: Negative for congestion and rhinorrhea  Eyes: Negative for visual disturbance  Respiratory: Positive for shortness of breath  Negative for cough and chest tightness  Cardiovascular: Positive for leg swelling  Negative for chest pain and palpitations  Gastrointestinal: Negative for abdominal pain, blood in stool, diarrhea, nausea and vomiting  Endocrine: Negative for polydipsia, polyphagia and polyuria  Genitourinary: Negative for dysuria, frequency and urgency  Musculoskeletal: Positive for arthralgias, back pain, gait problem, neck pain and neck stiffness  Skin: Negative for color change  Neurological: Negative for dizziness and headaches  Hematological: Does not bruise/bleed easily  Psychiatric/Behavioral: Negative for confusion and sleep disturbance  The patient is not nervous/anxious  Video Exam    There were no vitals filed for this visit  Physical Exam  Constitutional:       Appearance: She is well-developed and well-groomed  Neck:      Comments:  Slight kyphosis  Neurological:      Mental Status: She is alert and oriented to person, place, and time  Mental status is at baseline  Psychiatric:         Mood and Affect: Mood and affect normal          Speech: Speech normal          Behavior: Behavior is cooperative  I spent 25 minutes directly with the patient during this visit    VIRTUAL VISIT DISCLAIMER      Mio Ordoñez verbally agrees to participate in [employer]   Pt is aware that [employer] could be limited without vital signs or the ability to perform a full hands-on physical exam  Julissa Ordoñez understands she or the provider may request at any time to terminate the video visit and request the patient to seek care or treatment in person

## 2021-09-29 ENCOUNTER — OFFICE VISIT (OUTPATIENT)
Dept: INTERNAL MEDICINE CLINIC | Facility: CLINIC | Age: 86
End: 2021-09-29
Payer: MEDICARE

## 2021-09-29 VITALS
SYSTOLIC BLOOD PRESSURE: 124 MMHG | HEART RATE: 120 BPM | TEMPERATURE: 98.2 F | DIASTOLIC BLOOD PRESSURE: 76 MMHG | BODY MASS INDEX: 24.77 KG/M2 | HEIGHT: 57 IN | OXYGEN SATURATION: 97 % | WEIGHT: 114.8 LBS

## 2021-09-29 DIAGNOSIS — M06.9 RHEUMATOID ARTHRITIS INVOLVING MULTIPLE SITES, UNSPECIFIED WHETHER RHEUMATOID FACTOR PRESENT (HCC): ICD-10-CM

## 2021-09-29 DIAGNOSIS — J44.9 CHRONIC OBSTRUCTIVE PULMONARY DISEASE, UNSPECIFIED COPD TYPE (HCC): ICD-10-CM

## 2021-09-29 DIAGNOSIS — M15.9 PRIMARY OSTEOARTHRITIS INVOLVING MULTIPLE JOINTS: ICD-10-CM

## 2021-09-29 DIAGNOSIS — I10 BENIGN ESSENTIAL HYPERTENSION: Primary | ICD-10-CM

## 2021-09-29 DIAGNOSIS — C91.10 CLL (CHRONIC LYMPHOCYTIC LEUKEMIA) (HCC): ICD-10-CM

## 2021-09-29 DIAGNOSIS — N18.30 STAGE 3 CHRONIC KIDNEY DISEASE, UNSPECIFIED WHETHER STAGE 3A OR 3B CKD (HCC): ICD-10-CM

## 2021-09-29 DIAGNOSIS — E03.9 HYPOTHYROIDISM, UNSPECIFIED TYPE: ICD-10-CM

## 2021-09-29 PROCEDURE — 99214 OFFICE O/P EST MOD 30 MIN: CPT | Performed by: FAMILY MEDICINE

## 2021-09-30 NOTE — PROGRESS NOTES
Assessment/Plan:    No problem-specific Assessment & Plan notes found for this encounter  Diagnoses and all orders for this visit:    Benign essential hypertension  -     CBC and differential; Future  -     Comprehensive metabolic panel; Future  -     Lipid panel; Future    Rheumatoid arthritis involving multiple sites, unspecified whether rheumatoid factor present (HCC)  -     CBC and differential; Future    Primary osteoarthritis involving multiple joints  -     CBC and differential; Future    Hypothyroidism, unspecified type  -     CBC and differential; Future  -     Lipid panel; Future  -     TSH, 3rd generation; Future    Chronic obstructive pulmonary disease, unspecified COPD type (HCC)  -     CBC and differential; Future    Stage 3 chronic kidney disease, unspecified whether stage 3a or 3b CKD (HCC)  -     CBC and differential; Future  -     Comprehensive metabolic panel; Future    CLL (chronic lymphocytic leukemia) (HCC)  -     CBC and differential; Future      orders recommendations as noted above  Previous laboratory testing reviewed with her  Slip given for additional laboratory testing  Continue follow-up with Cardiology as per their recommendations  Continue follow-up with Hematology/Oncology for the CLL  Recent stressors reviewed with her  Discussed options for help especially with her children having significant psychiatric issues  Blood pressure controlled off of the losartan  Continue the Maxzide on a daily basis  Continue the Lasix as needed  Peripheral edema has lessened significantly  Elevate legs as much as possible  Try to remain as active as possible but be careful to avoid falls  Continue current dose of the levothyroxine / Synthroid  Will adjust if needed based on the upcoming laboratory testing  Reflux symptoms controlled  Continue the omeprazole  Rheumatoid arthritis discussed  Watch for any worsening  Breathing symptoms currently stable  Watch for any worsening  Creatinine has been mildly elevated in the past   Will recheck this with upcoming laboratory testing  Stay adequately hydrated  Avoid nephrotoxins  Will have her follow up in about 3-5 months with laboratory testing likely prior to that visit  Follow up sooner if needed  Subjective:      Patient ID: Brittney Awan is a 80 y o  female  She presents for follow-up  Has been under a lot of stress at home  Three of her children are living with her at home and each has significant psychiatric issues  Her son has medical issues and is basically housebound  This is coupled by his psychiatric illnesses  Her daughter has been more difficult since going off of her medications  She has noticed that she cannot do as many things around the house  Has significant pain into the back especially if standing for any period of time  Has been taking Tylenol if needed  Has followed up with back specialist in the past   She had been feeling more tired  Had stop the losartan and has been tolerating this well  Continues with the MAC side and the Lasix as needed  Often she will alternate these medications  Feels chronically tired  Follows up with Hematology regularly for the CLL  Denies any fevers or chills  Appetite has been generally stable  Denies any nausea, vomiting, or diarrhea  She declines immunizations  The following portions of the patient's history were reviewed and updated as appropriate:   She  has a past medical history of Carpal tunnel syndrome, Cubital tunnel syndrome on left, Dyslipidemia, Ectopic heartbeats, Hypertension, Malignant melanoma of skin ([address] ), Premature atrial contraction, and Transient ischemic attack    She   Patient Active Problem List    Diagnosis Date Noted    Bilateral primary osteoarthritis of knee 10/06/2020    Low back pain 01/27/2020    Lumbosacral spondylosis without myelopathy 01/27/2020    Degeneration of intervertebral disc 01/27/2020    Cervical stenosis of spinal canal 2020    Arthritis of ankle 2020    Arthritis of right knee 2020    Peripheral edema 2019    Lung nodules 2019    Kyphosis 10/23/2018    Venous stasis 2018    CKD (chronic kidney disease) stage 3, GFR 30-59 ml/min [facility]) 2018    CLL (chronic lymphocytic leukemia) ([facility] ) 2018    History of thoracotomy 2018    History of D&C 2018    History of lumpectomy of right breast 2018    History of pneumothorax 2018    Hx of emergency  section 2018     (spontaneous vaginal delivery) 2018    Hemoptysis 2018    Pulmonary disease 2018    RA (rheumatoid arthritis) ([facility] ) 2018    Cervical spondylosis without myelopathy 2017    Hypokalemia 2017    Lumbar stenosis 2017    Hyponatremia 10/31/2016    Premature atrial contraction 2016    Scoliosis, or kyphoscoliosis, idiopathic 2016    Trochanteric bursitis of both hips 2015    Dyslipidemia 2014    Combined disorders of mitral, aortic and tricuspid valves 2014    Chronic obstructive pulmonary disease ([facility] ) 2013    [facility] hypertension [facility] ) 2013    Pulmonary emphysema ([facility] ) 2013    Chronic kidney insufficiency 2012    Esophageal reflux 2012    Benign essential hypertension 2012    Hypothyroidism 2012    Osteoarthritis 2012    Spinal stenosis 2012    History of blood transfusion 1966     She  has a past surgical history that includes Appendectomy; Breast lumpectomy; Cataract extraction;  section; Anterior release vertebral body w/ posterior fusion; Hysterectomy; Lumbar laminectomy; NEUROPLASTY; Oophorectomy; Thoracoscopy w/ talc pleurodesis; and Tonsillectomy  Her family history includes Cancer in her family; Colon cancer in her mother; Diabetes in her maternal uncle and paternal aunt;  Heart disease in her father; Hypertension in her mother; Rheum arthritis in her father; Stroke in her family  She  reports that she has never smoked  She has never used smokeless tobacco  She reports current alcohol use  She reports that she does not use drugs    Current Outpatient Medications   Medication Sig Dispense Refill    ALPRAZolam (XANAX) 0 25 mg tablet TAKE 1 TABLET TWICE A DAY AS NEEDED FOR ANXIETY 180 tablet 0    ascorbic acid (VITAMIN C) 250 mg tablet Take 1 tablet by mouth daily      budesonide (Rhinocort Allergy) 32 MCG/ACT nasal spray Rhinocort Aqua 32 mcg/actuation nasal spray      calcium-vitamin D (OSCAL-500) 500-400 MG-UNIT per tablet Take by mouth      CeleBREX 200 MG capsule TAKE 1 CAPSULE TWICE A  capsule 3    Cholecalciferol (VITAMIN D3) 2000 units capsule Take by mouth      coenzyme Q-10 100 MG capsule Take by mouth      Cranberry 400 MG CAPS Take by mouth      furosemide (LASIX) 20 mg tablet Take 1 tablet (20 mg total) by mouth daily 30 tablet 5    montelukast (SINGULAIR) 10 mg tablet TAKE 1 TABLET DAILY 90 tablet 3    moxifloxacin (VIGAMOX) 0 5 % ophthalmic solution Apply to eye      mupirocin (BACTROBAN) 2 % cream Apply topically 3 (three) times a day 30 g 4    olopatadine HCl (PATADAY) 0 2 % opth drops Apply to eye      omeprazole (PRILOSEC) 20 mg delayed release capsule Take 20 mg by mouth      potassium chloride (K-DUR,KLOR-CON) 20 mEq tablet Take 1 tablet (20 mEq total) by mouth daily 90 tablet 3    Premarin 1 25 MG tablet TAKE 1 TABLET DAILY 90 tablet 3    pyridoxine (VITAMIN B6) 100 mg tablet Take 50 mg by mouth daily        Synthroid 112 MCG tablet TAKE 1 TABLET DAILY 90 tablet 3    triamterene-hydrochlorothiazide (MAXZIDE-25) 37 5-25 mg per tablet TAKE 1 TABLET DAILY 90 tablet 3    Vitamin E 200 units TABS Take 1 tablet by mouth daily      Zinc Gluconate 15 MG TABS Take 1 tablet by mouth daily      betamethasone valerate (VALISONE) 0 1 % cream Apply topically 2 (two) times a day (Patient not taking: Reported on 8/18/2021) 60 g 1     No current facility-administered medications for this visit  Current Outpatient Medications on File Prior to Visit   Medication Sig    ALPRAZolam (XANAX) 0 25 mg tablet TAKE 1 TABLET TWICE A DAY AS NEEDED FOR ANXIETY    ascorbic acid (VITAMIN C) 250 mg tablet Take 1 tablet by mouth daily    budesonide (Rhinocort Allergy) 32 MCG/ACT nasal spray Rhinocort Aqua 32 mcg/actuation nasal spray    calcium-vitamin D (OSCAL-500) 500-400 MG-UNIT per tablet Take by mouth    CeleBREX 200 MG capsule TAKE 1 CAPSULE TWICE A DAY    Cholecalciferol (VITAMIN D3) 2000 units capsule Take by mouth    coenzyme Q-10 100 MG capsule Take by mouth    Cranberry 400 MG CAPS Take by mouth    furosemide (LASIX) 20 mg tablet Take 1 tablet (20 mg total) by mouth daily    montelukast (SINGULAIR) 10 mg tablet TAKE 1 TABLET DAILY    moxifloxacin (VIGAMOX) 0 5 % ophthalmic solution Apply to eye    mupirocin (BACTROBAN) 2 % cream Apply topically 3 (three) times a day    olopatadine HCl (PATADAY) 0 2 % opth drops Apply to eye    omeprazole (PRILOSEC) 20 mg delayed release capsule Take 20 mg by mouth    potassium chloride (K-DUR,KLOR-CON) 20 mEq tablet Take 1 tablet (20 mEq total) by mouth daily    Premarin 1 25 MG tablet TAKE 1 TABLET DAILY    pyridoxine (VITAMIN B6) 100 mg tablet Take 50 mg by mouth daily      Synthroid 112 MCG tablet TAKE 1 TABLET DAILY    triamterene-hydrochlorothiazide (MAXZIDE-25) 37 5-25 mg per tablet TAKE 1 TABLET DAILY    Vitamin E 200 units TABS Take 1 tablet by mouth daily    Zinc Gluconate 15 MG TABS Take 1 tablet by mouth daily    betamethasone valerate (VALISONE) 0 1 % cream Apply topically 2 (two) times a day (Patient not taking: Reported on 8/18/2021)     No current facility-administered medications on file prior to visit       She is allergic to aspirin, codeine polt-chlorphen polt er, etanercept, lorazepam, morphine, oxycodone, penicillins, povidone, povidone iodine, and prednisone       Review of Systems   Constitutional: Positive for activity change and fatigue  Negative for appetite change, chills and fever  HENT: Positive for congestion  Negative for rhinorrhea  Eyes: Negative for visual disturbance  Respiratory: Negative for cough, chest tightness and shortness of breath  Cardiovascular: Negative for chest pain and palpitations  Gastrointestinal: Negative for abdominal pain, blood in stool, diarrhea, nausea and vomiting  Endocrine: Negative for polydipsia, polyphagia and polyuria  Genitourinary: Negative for dysuria, frequency and urgency  Musculoskeletal: Positive for arthralgias, back pain and gait problem  Skin: Negative for color change  Neurological: Negative for dizziness and headaches  Hematological: Does not bruise/bleed easily  Psychiatric/Behavioral: Negative for confusion and sleep disturbance  The patient is not nervous/anxious  Objective:      /76 (BP Location: Right arm, Patient Position: Sitting, Cuff Size: Standard)   Pulse (!) 120   Temp 98 2 °F (36 8 °C)   Ht 4' 9" (1 448 m)   Wt 52 1 kg (114 lb 12 8 oz)   SpO2 97%   BMI 24 84 kg/m²          Physical Exam  Vitals reviewed  Constitutional:       General: She is not in acute distress  Appearance: She is well-developed and well-groomed  HENT:      Head: Normocephalic and atraumatic  Eyes:      General:         Right eye: No discharge  Left eye: No discharge  Conjunctiva/sclera: Conjunctivae normal       Pupils: Pupils are equal, round, and reactive to light  Cardiovascular:      Rate and Rhythm: Normal rate and regular rhythm  Heart sounds: Murmur heard  No friction rub  No gallop  Pulmonary:      Effort: No respiratory distress  Breath sounds: Decreased breath sounds present  No wheezing or rales     Abdominal:      General: Bowel sounds are normal  There is no distension  Tenderness: There is no abdominal tenderness  Musculoskeletal:      Comments:   Slow antalgic gait   Lymphadenopathy:      Cervical: No cervical adenopathy  Skin:     General: Skin is warm and dry  Neurological:      Mental Status: She is alert and oriented to person, place, and time  Psychiatric:         Mood and Affect: Mood and affect normal          Speech: Speech normal          Behavior: Behavior normal  Behavior is cooperative

## 2021-11-08 DIAGNOSIS — J30.1 ALLERGIC RHINITIS DUE TO POLLEN, UNSPECIFIED SEASONALITY: ICD-10-CM

## 2021-11-08 RX ORDER — MONTELUKAST SODIUM 10 MG/1
TABLET ORAL
Qty: 90 TABLET | Refills: 3 | Status: SHIPPED | OUTPATIENT
Start: 2021-11-08

## 2021-11-11 NOTE — ASSESSMENT & PLAN NOTE
She did not have her laboratory testing done prior to this visit  She plans on having it done immediately after the visit  Discussed with her the importance of trying to have the laboratory testing done prior to the visit so this can be discussed at the time of the visit and changes can be made  For now will continue the same dose of levothyroxine 100 mcg on a daily basis  Will adjust this if needed based on the upcoming laboratory testing  Signs and symptoms of hypothyroidism and hyperthyroidism reviewed with her  6/28/21 EGD : (-) Navi's esophagus. 2cm hiatal hernia. Narrow 6cm pouch.  Pathology : gastric mucosa with intestinal metaplasia (-) dysplasia  10/29/21 CXR : WNL  3/5/21 UGI : sliding hiatal hernia s/p Nissen and bypass

## 2022-01-11 ENCOUNTER — TELEMEDICINE (OUTPATIENT)
Dept: INTERNAL MEDICINE CLINIC | Facility: CLINIC | Age: 87
End: 2022-01-11
Payer: MEDICARE

## 2022-01-11 DIAGNOSIS — R53.1 WEAKNESS: Primary | ICD-10-CM

## 2022-01-11 DIAGNOSIS — R05.9 COUGH: ICD-10-CM

## 2022-01-11 PROCEDURE — 99213 OFFICE O/P EST LOW 20 MIN: CPT | Performed by: NURSE PRACTITIONER

## 2022-01-11 NOTE — PROGRESS NOTES
Virtual Regular Visit    Verification of patient location:    Patient is located in the following state in which I hold an active license [place]      Assessment/Plan: Patient is deferring labs, CXR, and swabbing  I did advise she should really have labs and testing done due to symptoms  Will call patient tomorrow to see how she is feeling  Did advise if symptoms worsen to go to ER  Problem List Items Addressed This Visit        Other    Weakness - Primary    Cough               Reason for visit is   Chief Complaint   Patient presents with    Virtual Regular Visit        Encounter provider [id number]     Provider located at [address] [place] [address]  [address] [id number]      Recent Visits  No visits were found meeting these conditions  Showing recent visits within past 7 days and meeting all other requirements  Today's Visits  Date Type Provider Dept   01/11/22 Telemedicine [facility]   Showing today's visits and meeting all other requirements  Future Appointments  No visits were found meeting these conditions  Showing future appointments within next 150 days and meeting all other requirements       The patient was identified by name and date of birth  Venessa Moran was informed that this is a telemedicine visit and that the visit is being conducted through Telephone  My office door was closed  No one else was in the room  She acknowledged consent and understanding of privacy and security of the video platform  The patient has agreed to participate and understands they can discontinue the visit at any time  Patient is aware this is a billable service  Subjective  Venessa Moran is a 80 y o  female patient   Isa Paling is for an acute visit  She states starting 2 days before New Years Fernanda she started with a cough and congestion   She states she is very weak and can hardly walk  She did not get the covid and flu vaccines  She states she has not been out anywhere but everyone in the house is sick  She states she is not wanting to leave the house and go anywhere  She offers no other issues         Past Medical History:   Diagnosis Date    Carpal tunnel syndrome     unspecified , lateraly ,   resolved 2014    Cubital tunnel syndrome on left     resolved 2014    Dyslipidemia     Ectopic heartbeats     resolved 2014    Hypertension     Malignant melanoma of skin ([address] )     resolved 2014    Premature atrial contraction     Transient ischemic attack     resolved 2014       Past Surgical History:   Procedure Laterality Date    ANTERIOR RELEASE VERTEBRAL BODY W/ POSTERIOR FUSION      APPENDECTOMY      BREAST LUMPECTOMY      CATARACT EXTRACTION       SECTION      HYSTERECTOMY      LUMBAR LAMINECTOMY      NEUROPLASTY      decompression medial nerve at carpal tunnel     OOPHORECTOMY      THORACOSCOPY W/ TALC PLEURODESIS      TONSILLECTOMY         Current Outpatient Medications   Medication Sig Dispense Refill    ALPRAZolam (XANAX) 0 25 mg tablet TAKE 1 TABLET TWICE A DAY AS NEEDED FOR ANXIETY 180 tablet 0    ascorbic acid (VITAMIN C) 250 mg tablet Take 1 tablet by mouth daily      betamethasone valerate (VALISONE) 0 1 % cream Apply topically 2 (two) times a day (Patient not taking: Reported on 2021) 60 g 1    budesonide (Rhinocort Allergy) 32 MCG/ACT nasal spray Rhinocort Aqua 32 mcg/actuation nasal spray      calcium-vitamin D (OSCAL-500) 500-400 MG-UNIT per tablet Take by mouth      CeleBREX 200 MG capsule TAKE 1 CAPSULE TWICE A  capsule 3    Cholecalciferol (VITAMIN D3) 2000 units capsule Take by mouth      coenzyme Q-10 100 MG capsule Take by mouth      Cranberry 400 MG CAPS Take by mouth      furosemide (LASIX) 20 mg tablet Take 1 tablet (20 mg total) by mouth daily 30 tablet 5    montelukast (SINGULAIR) 10 mg tablet TAKE 1 TABLET DAILY 90 tablet 3    moxifloxacin (VIGAMOX) 0 5 % ophthalmic solution Apply to eye      mupirocin (BACTROBAN) 2 % cream Apply topically 3 (three) times a day 30 g 4    olopatadine HCl (PATADAY) 0 2 % opth drops Apply to eye      omeprazole (PRILOSEC) 20 mg delayed release capsule Take 20 mg by mouth      potassium chloride (K-DUR,KLOR-CON) 20 mEq tablet Take 1 tablet (20 mEq total) by mouth daily 90 tablet 3    Premarin 1 25 MG tablet TAKE 1 TABLET DAILY 90 tablet 3    pyridoxine (VITAMIN B6) 100 mg tablet Take 50 mg by mouth daily        Synthroid 112 MCG tablet TAKE 1 TABLET DAILY 90 tablet 3    triamterene-hydrochlorothiazide (MAXZIDE-25) 37 5-25 mg per tablet TAKE 1 TABLET DAILY 90 tablet 3    Vitamin E 200 units TABS Take 1 tablet by mouth daily      Zinc Gluconate 15 MG TABS Take 1 tablet by mouth daily       No current facility-administered medications for this visit  Allergies   Allergen Reactions    Aspirin     Codeine Polt-Chlorphen Polt Er     Etanercept     Lorazepam     Morphine     Oxycodone     Penicillins     Povidone Hives     Shaky    Povidone Iodine     Prednisone        Review of Systems   Respiratory: Positive for cough  Neurological: Positive for weakness  All other systems reviewed and are negative  Video Exam    There were no vitals filed for this visit  Physical Exam  Neurological:      Mental Status: She is alert  Psychiatric:         Mood and Affect: Mood normal          Behavior: Behavior normal          Thought Content: Thought content normal          Judgment: Judgment normal           I spent 10 minutes directly with the patient during this visit    VIRTUAL VISIT DISCLAIMER      Pablo Ordoñez verbally agrees to participate in [employer]   Pt is aware that Virtual Care Services could be limited without vital signs or the ability to perform a full hands-on physical exam  Julissa Ordoñez understands she or the provider may request at any time to terminate the video visit and request the patient to seek care or treatment in person

## 2022-02-25 DIAGNOSIS — I10 ESSENTIAL HYPERTENSION: ICD-10-CM

## 2022-02-25 DIAGNOSIS — E03.9 HYPOTHYROIDISM, UNSPECIFIED TYPE: ICD-10-CM

## 2022-02-25 RX ORDER — LEVOTHYROXINE SODIUM 112 MCG
TABLET ORAL
Qty: 90 TABLET | Refills: 3 | Status: SHIPPED | OUTPATIENT
Start: 2022-02-25

## 2022-02-25 RX ORDER — TRIAMTERENE AND HYDROCHLOROTHIAZIDE 37.5; 25 MG/1; MG/1
TABLET ORAL
Qty: 90 TABLET | Refills: 3 | Status: SHIPPED | OUTPATIENT
Start: 2022-02-25

## 2022-04-13 DIAGNOSIS — Z78.0 POSTMENOPAUSAL: ICD-10-CM

## 2022-04-13 RX ORDER — ESTROGENS, CONJUGATED 1.25 MG
TABLET ORAL
Qty: 90 TABLET | Refills: 3 | Status: SHIPPED | OUTPATIENT
Start: 2022-04-13

## 2022-04-18 ENCOUNTER — TELEPHONE (OUTPATIENT)
Dept: INTERNAL MEDICINE CLINIC | Facility: CLINIC | Age: 87
End: 2022-04-18

## 2022-04-18 NOTE — TELEPHONE ENCOUNTER
Pt called stating she wanted something for bed sores  Stated she has been using Bacitracin on them and it is not helping  Stated she has them since she has COVID  Stated about as big as a half dollar  She stated they are surface    I did ask her about [facility]  and she refused and stated she can take care of them herself

## 2022-04-21 ENCOUNTER — TELEPHONE (OUTPATIENT)
Dept: INTERNAL MEDICINE CLINIC | Facility: CLINIC | Age: 87
End: 2022-04-21

## 2022-04-21 NOTE — TELEPHONE ENCOUNTER
Patient's son Isaac Pack called to cancel today's appointment stating that patient's wounds are healing

## 2022-05-31 ENCOUNTER — TELEPHONE (OUTPATIENT)
Dept: INTERNAL MEDICINE CLINIC | Facility: CLINIC | Age: 87
End: 2022-05-31

## 2022-05-31 NOTE — TELEPHONE ENCOUNTER
Josefa called from [facility] because medicare would not cover Julissa's synthroid but they would cover the generic brand  I spoke with Tomás Brown and she stated that it would not be an issue  I informed Josefa and she connected the call to a pharmasist named Shayy Rosales to confirm the information

## 2022-05-31 NOTE — TELEPHONE ENCOUNTER
Isai Hernandez called stating she needs auth for both Celebrex and Synthroid  I see the generic was approved  She stated she needs name brand Synthroid not the generic

## 2022-06-15 ENCOUNTER — TELEPHONE (OUTPATIENT)
Dept: INTERNAL MEDICINE CLINIC | Facility: CLINIC | Age: 87
End: 2022-06-15

## 2022-06-15 NOTE — TELEPHONE ENCOUNTER
Patient called to reschedule appointment for 6/16 because her daughter's grandchildren are coming in and the daughter is her ride  Ziyad/son told me this when I called back to reschedule the appointment  I also told Tamela Hashimoto that patient must be seen before September otherwise she will no longer get medication  Tamela Hashimoto said "oh boy"  I told Tamela Hashimoto that I was documenting this in the chart

## 2022-06-29 ENCOUNTER — APPOINTMENT (OUTPATIENT)
Dept: LAB | Facility: HOSPITAL | Age: 87
End: 2022-06-29
Payer: MEDICARE

## 2022-06-29 DIAGNOSIS — E03.9 HYPOTHYROIDISM, UNSPECIFIED TYPE: ICD-10-CM

## 2022-06-29 DIAGNOSIS — J44.9 CHRONIC OBSTRUCTIVE PULMONARY DISEASE, UNSPECIFIED COPD TYPE (HCC): ICD-10-CM

## 2022-06-29 DIAGNOSIS — N18.30 STAGE 3 CHRONIC KIDNEY DISEASE, UNSPECIFIED WHETHER STAGE 3A OR 3B CKD (HCC): ICD-10-CM

## 2022-06-29 DIAGNOSIS — I10 BENIGN ESSENTIAL HYPERTENSION: ICD-10-CM

## 2022-06-29 DIAGNOSIS — M06.9 RHEUMATOID ARTHRITIS INVOLVING MULTIPLE SITES, UNSPECIFIED WHETHER RHEUMATOID FACTOR PRESENT (HCC): ICD-10-CM

## 2022-06-29 DIAGNOSIS — M15.9 PRIMARY OSTEOARTHRITIS INVOLVING MULTIPLE JOINTS: ICD-10-CM

## 2022-06-29 DIAGNOSIS — C91.10 CLL (CHRONIC LYMPHOCYTIC LEUKEMIA) (HCC): ICD-10-CM

## 2022-06-29 LAB
ALBUMIN SERPL BCP-MCNC: 3.8 G/DL (ref 3.5–5)
ALP SERPL-CCNC: 60 U/L (ref 46–116)
ALT SERPL W P-5'-P-CCNC: 22 U/L (ref 12–78)
ANION GAP SERPL CALCULATED.3IONS-SCNC: 9 MMOL/L (ref 4–13)
AST SERPL W P-5'-P-CCNC: 26 U/L (ref 5–45)
BASOPHILS # BLD MANUAL: 0 THOUSAND/UL (ref 0–0.1)
BASOPHILS NFR MAR MANUAL: 0 % (ref 0–1)
BILIRUB SERPL-MCNC: 0.87 MG/DL (ref 0.2–1)
BUN SERPL-MCNC: 37 MG/DL (ref 5–25)
CALCIUM SERPL-MCNC: 9.7 MG/DL (ref 8.3–10.1)
CHLORIDE SERPL-SCNC: 98 MMOL/L (ref 100–108)
CHOLEST SERPL-MCNC: 250 MG/DL
CO2 SERPL-SCNC: 30 MMOL/L (ref 21–32)
CREAT SERPL-MCNC: 1.62 MG/DL (ref 0.6–1.3)
EOSINOPHIL # BLD MANUAL: 0 THOUSAND/UL (ref 0–0.4)
EOSINOPHIL NFR BLD MANUAL: 0 % (ref 0–6)
ERYTHROCYTE [DISTWIDTH] IN BLOOD BY AUTOMATED COUNT: 12.3 % (ref 11.6–15.1)
GFR SERPL CREATININE-BSD FRML MDRD: 28 ML/MIN/1.73SQ M
GLUCOSE P FAST SERPL-MCNC: 86 MG/DL (ref 65–99)
HCT VFR BLD AUTO: 40.7 % (ref 34.8–46.1)
HDLC SERPL-MCNC: 96 MG/DL
HGB BLD-MCNC: 13.4 G/DL (ref 11.5–15.4)
LDLC SERPL CALC-MCNC: 135 MG/DL (ref 0–100)
LYMPHOCYTES # BLD AUTO: 14.73 THOUSAND/UL (ref 0.6–4.47)
LYMPHOCYTES # BLD AUTO: 74 % (ref 14–44)
MCH RBC QN AUTO: 29.5 PG (ref 26.8–34.3)
MCHC RBC AUTO-ENTMCNC: 32.9 G/DL (ref 31.4–37.4)
MCV RBC AUTO: 90 FL (ref 82–98)
MONOCYTES # BLD AUTO: 0.6 THOUSAND/UL (ref 0–1.22)
MONOCYTES NFR BLD: 3 % (ref 4–12)
NEUTROPHILS # BLD MANUAL: 4.58 THOUSAND/UL (ref 1.85–7.62)
NEUTS SEG NFR BLD AUTO: 23 % (ref 43–75)
NONHDLC SERPL-MCNC: 154 MG/DL
PLATELET # BLD AUTO: 207 THOUSANDS/UL (ref 149–390)
PLATELET BLD QL SMEAR: ADEQUATE
PMV BLD AUTO: 11.6 FL (ref 8.9–12.7)
POTASSIUM SERPL-SCNC: 3.4 MMOL/L (ref 3.5–5.3)
PROT SERPL-MCNC: 7.5 G/DL (ref 6.4–8.2)
RBC # BLD AUTO: 4.55 MILLION/UL (ref 3.81–5.12)
RBC MORPH BLD: NORMAL
SMUDGE CELLS BLD QL SMEAR: PRESENT
SODIUM SERPL-SCNC: 137 MMOL/L (ref 136–145)
TRIGL SERPL-MCNC: 96 MG/DL
TSH SERPL DL<=0.05 MIU/L-ACNC: 1.04 UIU/ML (ref 0.45–4.5)
WBC # BLD AUTO: 19.9 THOUSAND/UL (ref 4.31–10.16)

## 2022-06-29 PROCEDURE — 80061 LIPID PANEL: CPT

## 2022-06-29 PROCEDURE — 36415 COLL VENOUS BLD VENIPUNCTURE: CPT

## 2022-06-29 PROCEDURE — 85007 BL SMEAR W/DIFF WBC COUNT: CPT

## 2022-06-29 PROCEDURE — 84443 ASSAY THYROID STIM HORMONE: CPT

## 2022-06-29 PROCEDURE — 85027 COMPLETE CBC AUTOMATED: CPT

## 2022-06-29 PROCEDURE — 80053 COMPREHEN METABOLIC PANEL: CPT

## 2022-06-30 ENCOUNTER — OFFICE VISIT (OUTPATIENT)
Dept: INTERNAL MEDICINE CLINIC | Facility: CLINIC | Age: 87
End: 2022-06-30
Payer: MEDICARE

## 2022-06-30 VITALS
OXYGEN SATURATION: 93 % | HEART RATE: 121 BPM | BODY MASS INDEX: 24.14 KG/M2 | WEIGHT: 111.9 LBS | HEIGHT: 57 IN | TEMPERATURE: 97.8 F | SYSTOLIC BLOOD PRESSURE: 116 MMHG | DIASTOLIC BLOOD PRESSURE: 72 MMHG

## 2022-06-30 DIAGNOSIS — I10 BENIGN ESSENTIAL HYPERTENSION: ICD-10-CM

## 2022-06-30 DIAGNOSIS — Z00.00 MEDICARE ANNUAL WELLNESS VISIT, SUBSEQUENT: ICD-10-CM

## 2022-06-30 DIAGNOSIS — I27.21 PULMONARY ARTERY HYPERTENSION (HCC): ICD-10-CM

## 2022-06-30 DIAGNOSIS — M06.9 RHEUMATOID ARTHRITIS INVOLVING MULTIPLE SITES, UNSPECIFIED WHETHER RHEUMATOID FACTOR PRESENT (HCC): ICD-10-CM

## 2022-06-30 DIAGNOSIS — M17.11 ARTHRITIS OF RIGHT KNEE: ICD-10-CM

## 2022-06-30 DIAGNOSIS — N18.4 CHRONIC RENAL DISEASE, STAGE IV (HCC): ICD-10-CM

## 2022-06-30 DIAGNOSIS — C91.10 CLL (CHRONIC LYMPHOCYTIC LEUKEMIA) (HCC): ICD-10-CM

## 2022-06-30 DIAGNOSIS — J44.9 CHRONIC OBSTRUCTIVE PULMONARY DISEASE, UNSPECIFIED COPD TYPE (HCC): ICD-10-CM

## 2022-06-30 DIAGNOSIS — E87.6 HYPOKALEMIA: ICD-10-CM

## 2022-06-30 DIAGNOSIS — E78.5 DYSLIPIDEMIA: ICD-10-CM

## 2022-06-30 DIAGNOSIS — I48.0 PAROXYSMAL ATRIAL FIBRILLATION (HCC): Primary | ICD-10-CM

## 2022-06-30 DIAGNOSIS — M48.00 SPINAL STENOSIS, UNSPECIFIED SPINAL REGION: ICD-10-CM

## 2022-06-30 PROCEDURE — 1123F ACP DISCUSS/DSCN MKR DOCD: CPT | Performed by: FAMILY MEDICINE

## 2022-06-30 PROCEDURE — 99214 OFFICE O/P EST MOD 30 MIN: CPT | Performed by: FAMILY MEDICINE

## 2022-06-30 PROCEDURE — G0439 PPPS, SUBSEQ VISIT: HCPCS | Performed by: FAMILY MEDICINE

## 2022-06-30 NOTE — PROGRESS NOTES
Assessment and Plan:     Problem List Items Addressed This Visit        Respiratory    Chronic obstructive pulmonary disease ([facility] )       Cardiovascular and Mediastinum    Benign essential hypertension    Relevant Orders    CBC and differential    Pulmonary artery hypertension (HCC)    Paroxysmal atrial fibrillation (HCC) - Primary       Musculoskeletal and Integument    RA (rheumatoid arthritis) ([facility] )    Arthritis of right knee       Genitourinary    Chronic renal disease, stage IV (HCC)       Other    CLL (chronic lymphocytic leukemia) (HCC)    Dyslipidemia    Hypokalemia    Relevant Orders    CBC and differential    Comprehensive metabolic panel    Spinal stenosis    Relevant Orders    CBC and differential      Other Visit Diagnoses     Medicare annual wellness visit, subsequent            Orders recommendations as noted above  Issues over the past 6 months reviewed with her  Significant illness and becoming homebound with the coronavirus discussed  Declines the immunization  Continue to gradually increase activity level  Continue to use the walker or cane regularly  Definite irregularity on exam   Had been evaluated by Cardiology for atrial fibrillation in the past   Advised her to follow-up with them over the next few months  Watch for any worsening of joint symptoms  Definite deformities noted in the hands likely related to the rheumatoid  Remain as active as possible but be careful to avoid falls  Continue follow-up with Hematology regularly for the CLL  Recent laboratory testing showed only slight elevation to her white blood cell count  Remains fatigued  Appetite has decreased  Cholesterol remains significantly elevated  Declines statin medication  Potassium mildly low  Discussed foods that are higher in potassium  Need to be careful regarding this because of her renal insufficiency  Will have her follow up in about 3-4 months or sooner if needed      Depression Screening and Follow-up Plan: Patient was screened for depression during today's encounter  They screened negative with a PHQ-2 score of 0  Preventive health issues were discussed with patient, and age appropriate screening tests were ordered as noted in patient's After Visit Summary  Personalized health advice and appropriate referrals for health education or preventive services given if needed, as noted in patient's After Visit Summary  History of Present Illness:     Patient presents for a Medicare Wellness Visit    She presents for routine follow-up as well as Medicare wellness visit  Has not been seen in quite a while  Was generally in her usual state of health until around the end of last year  Near new year's Fernanda, she began to feel ill  Had increased cough and chest congestion  She states that she really does not remember most of the time between new year's Fernanda and the middle to the end of February  Was closely on the couch or her chair  Could no longer take the stairs  Did test positive for coronavirus at home  Declined evaluation at the emergency room or any office visits  Had been lying so much that she developed some sores on the buttocks area  These have since resolved  She has gradually improved since that point  Initially could not ambulate independently at all and required significant assistance from her children  Initially begin short ambulation with the walker  Now has transition to the cane  Is feeling generally better  Appetite is slowly improving  Still with some chronic shortness of breath  Chronic joint aches especially into the back  Tries to drink fluids regularly  Denies any headaches or localized weakness  Cough has since resolved  Patient Care Team:  Dashawn Thomas MD as PCP - DO Katina Ramos, Gurpreet Liu MD     Review of Systems:     Review of Systems   Constitutional: Positive for activity change, appetite change and fatigue   Negative for chills and fever  HENT: Positive for hearing loss  Negative for congestion and rhinorrhea  Eyes: Negative for visual disturbance  Respiratory: Negative for cough, chest tightness and shortness of breath  Cardiovascular: Negative for chest pain and palpitations  Gastrointestinal: Negative for abdominal pain, blood in stool, diarrhea, nausea and vomiting  Endocrine: Negative for polydipsia, polyphagia and polyuria  Genitourinary: Negative for dysuria, frequency and urgency  Musculoskeletal: Positive for arthralgias, back pain and gait problem  Skin: Negative for color change  Neurological: Negative for dizziness and headaches  Hematological: Does not bruise/bleed easily  Psychiatric/Behavioral: Negative for confusion and sleep disturbance  The patient is not nervous/anxious           Problem List:     Patient Active Problem List   Diagnosis    Cervical spondylosis without myelopathy    Chronic kidney insufficiency    Chronic obstructive pulmonary disease (HCC)    CKD (chronic kidney disease) stage 3, GFR 30-59 ml/min (HCC)    [facility]    Combined disorders of mitral, aortic and tricuspid valves    Dyslipidemia    Pulmonary emphysema (HCC)    Esophageal reflux    History of thoracotomy    History of blood transfusion    History of D&C    History of lumpectomy of right breast    History of pneumothorax    Hx of emergency  section     (spontaneous vaginal delivery)    Benign essential hypertension    Hypokalemia    Hyponatremia    Hypothyroidism    Hemoptysis    Lumbar stenosis    Pulmonary disease    RA (rheumatoid arthritis) ([facility] )    Osteoarthritis    Premature atrial contraction    Pulmonary artery hypertension (HCC)    Scoliosis, or kyphoscoliosis, idiopathic    Spinal stenosis    Trochanteric bursitis of both hips    Venous stasis    Kyphosis    Lung nodules    Peripheral edema    Paroxysmal atrial fibrillation ([facility] )    Low back pain    Lumbosacral spondylosis without myelopathy    Degeneration of intervertebral disc    Cervical stenosis of spinal canal    Arthritis of ankle    Arthritis of right knee    Bilateral primary osteoarthritis of knee    Weakness    Cough    Chronic renal disease, stage IV [facility])      Past Medical and Surgical History:     Past Medical History:   Diagnosis Date    Carpal tunnel syndrome     unspecified , lateraly ,   resolved 2014    Cubital tunnel syndrome on left     resolved 2014    Dyslipidemia     Ectopic heartbeats     resolved 2014    Hypertension     Malignant melanoma of skin ([facility] )     resolved 2014    Premature atrial contraction     Transient ischemic attack     resolved 2014     Past Surgical History:   Procedure Laterality Date    ANTERIOR RELEASE VERTEBRAL BODY W/ POSTERIOR FUSION      APPENDECTOMY      BREAST LUMPECTOMY      CATARACT EXTRACTION       SECTION      HYSTERECTOMY      LUMBAR LAMINECTOMY      NEUROPLASTY      decompression medial nerve at carpal tunnel     OOPHORECTOMY      THORACOSCOPY W/ TALC PLEURODESIS      TONSILLECTOMY        Family History:     Family History   Problem Relation Age of Onset    Colon cancer Mother     Hypertension Mother     Heart disease Father     Rheum arthritis Father     Cancer Family     Stroke Family     Diabetes Maternal Uncle     Diabetes Paternal Aunt       Social History:     Social History     Socioeconomic History    Marital status:       Spouse name: None    Number of children: None    Years of education: None    Highest education level: None   Occupational History    None   Tobacco Use    Smoking status: Never Smoker    Smokeless tobacco: Never Used   Vaping Use    Vaping Use: Never used   Substance and Sexual Activity    Alcohol use: Yes     Comment: social    Drug use: No    Sexual activity: None   Other Topics Concern    None   Social History Narrative    None     Social Determinants of Health     Financial Resource Strain: Not on file   Food Insecurity: Not on file   Transportation Needs: Not on file   Physical Activity: Not on file   Stress: Not on file   Social Connections: Not on file   Intimate Partner Violence: Not on file   Housing Stability: Not on file      Medications and Allergies:     Current Outpatient Medications   Medication Sig Dispense Refill    ALPRAZolam (XANAX) 0 25 mg tablet TAKE 1 TABLET TWICE A DAY AS NEEDED FOR ANXIETY 180 tablet 0    budesonide (RINOCORT AQUA) 32 MCG/ACT nasal spray Rhinocort Aqua 32 mcg/actuation nasal spray      CeleBREX 200 MG capsule TAKE 1 CAPSULE TWICE A  capsule 0    Cholecalciferol (VITAMIN D3) 2000 units capsule Take by mouth      coenzyme Q-10 100 MG capsule Take by mouth      furosemide (LASIX) 20 mg tablet Take 1 tablet (20 mg total) by mouth daily 30 tablet 5    montelukast (SINGULAIR) 10 mg tablet TAKE 1 TABLET DAILY 90 tablet 3    moxifloxacin (VIGAMOX) 0 5 % ophthalmic solution Apply to eye      olopatadine HCl (PATADAY) 0 2 % opth drops Apply to eye      potassium chloride (K-DUR,KLOR-CON) 20 mEq tablet Take 1 tablet (20 mEq total) by mouth daily 90 tablet 3    Premarin 1 25 MG tablet TAKE 1 TABLET DAILY 90 tablet 3    pyridoxine (VITAMIN B6) 100 mg tablet Take 50 mg by mouth daily        Synthroid 112 MCG tablet TAKE 1 TABLET DAILY 90 tablet 3    triamterene-hydrochlorothiazide (MAXZIDE-25) 37 5-25 mg per tablet TAKE 1 TABLET DAILY 90 tablet 3    Zinc Gluconate 15 MG TABS Take 1 tablet by mouth daily       No current facility-administered medications for this visit       Allergies   Allergen Reactions    Aspirin     Codeine Polt-Chlorphen Polt Er     Etanercept     Lorazepam     Morphine     Oxycodone     Penicillins     Povidone Hives     Shaky    Povidone Iodine     Prednisone       Immunizations:     Immunization History   Administered Date(s) Administered    INFLUENZA 10/31/2016    Influenza Split High Dose Preservative Free IM 10/31/2016    Pneumococcal Conjugate 13-Valent 10/31/2016    Pneumococcal Polysaccharide PPV23 10/31/2016    Tdap 10/31/2016      Health Maintenance: There are no preventive care reminders to display for this patient  Topic Date Due    COVID-19 Vaccine (1) Never done    Pneumococcal Vaccine: 65+ Years (2 - PPSV23 or PCV20) 10/31/2017    Influenza Vaccine (1) 09/01/2022      Medicare Screening Tests and Risk Assessments:     Tavia Mtz is here for her Subsequent Wellness visit  Last Medicare Wellness visit information reviewed, patient interviewed and updates made to the record today  Health Risk Assessment:   Patient rates overall health as good  Patient feels that their physical health rating is slightly worse  Patient is satisfied with their life  Eyesight was rated as slightly worse  Hearing was rated as slightly worse  Patient feels that their emotional and mental health rating is same  Patients states they are never, rarely angry  Patient states they are sometimes unusually tired/fatigued  Pain experienced in the last 7 days has been a lot  Patient's pain rating has been 5/10  Patient states that she has experienced no weight loss or gain in last 6 months  Depression Screening:   PHQ-2 Score: 0      Fall Risk Screening: In the past year, patient has experienced: no history of falling in past year      Urinary Incontinence Screening:   Patient has not leaked urine accidently in the last six months  Home Safety:  Patient has trouble with stairs inside or outside of their home  Patient has working smoke alarms and has working carbon monoxide detector  Home safety hazards include: loose rugs on the floor  Nutrition:   Current diet is Regular  Medications:   Patient is currently taking over-the-counter supplements   OTC medications include: see medication list  Patient is able to manage medications  Activities of Daily Living (ADLs)/Instrumental Activities of Daily Living (IADLs):   Walk and transfer into and out of bed and chair?: Yes  Dress and groom yourself?: Yes    Bathe or shower yourself?: Yes    Feed yourself? Yes  Do your laundry/housekeeping?: No  Manage your money, pay your bills and track your expenses?: Yes  Make your own meals?: No    Do your own shopping?: Yes    Previous Hospitalizations:   Any hospitalizations or ED visits within the last 12 months?: No      Advance Care Planning:   Living will: Yes    Durable POA for healthcare: Yes    Advanced directive: Yes    Provider agrees with end of life decisions: Yes      Cognitive Screening:   Provider or family/friend/caregiver concerned regarding cognition?: No    PREVENTIVE SCREENINGS      Cardiovascular Screening:    General: Screening Current      Diabetes Screening:     General: Screening Current      Colorectal Cancer Screening:     General: Screening Not Indicated      Breast Cancer Screening:     General: Patient Declines      Cervical Cancer Screening:    General: Screening Not Indicated      Osteoporosis Screening:    General: Patient Declines      Abdominal Aortic Aneurysm (AAA) Screening:        General: Screening Not Indicated      Lung Cancer Screening:     General: Screening Not Indicated      Hepatitis C Screening:    General: Screening Not Indicated    Screening, Brief Intervention, and Referral to Treatment (SBIRT)    Screening  Typical number of drinks in a day: 0  Typical number of drinks in a week: 0  Interpretation: Low risk drinking behavior  Single Item Drug Screening:  How often have you used an illegal drug (including marijuana) or a prescription medication for non-medical reasons in the past year? never    Single Item Drug Screen Score: 0  Interpretation: Negative screen for possible drug use disorder    Brief Intervention  Alcohol & drug use screenings were reviewed   No concerns regarding substance use disorder identified       No exam data present     Physical Exam:     /72 (BP Location: Right arm, Patient Position: Sitting, Cuff Size: Standard)   Pulse (!) 121   Temp 97 8 °F (36 6 °C)   Ht 4' 9" (1 448 m)   Wt 50 8 kg (111 lb 14 4 oz)   SpO2 93%   BMI 24 21 kg/m²     Physical Exam     Ho Case MD

## 2022-06-30 NOTE — PATIENT INSTRUCTIONS
Medicare Preventive Visit Patient Instructions  Thank you for completing your Welcome to Medicare Visit or Medicare Annual Wellness Visit today  Your next wellness visit will be due in one year (7/1/2023)  The screening/preventive services that you may require over the next 5-10 years are detailed below  Some tests may not apply to you based off risk factors and/or age  Screening tests ordered at today's visit but not completed yet may show as past due  Also, please note that scanned in results may not display below  Preventive Screenings:  Service Recommendations Previous Testing/Comments   Colorectal Cancer Screening  * Colonoscopy    * Fecal Occult Blood Test (FOBT)/Fecal Immunochemical Test (FIT)  * Fecal DNA/Cologuard Test  * Flexible Sigmoidoscopy Age: 54-65 years old   Colonoscopy: every 10 years (may be performed more frequently if at higher risk)  OR  FOBT/FIT: every 1 year  OR  Cologuard: every 3 years  OR  Sigmoidoscopy: every 5 years  Screening may be recommended earlier than age 48 if at higher risk for colorectal cancer  Also, an individualized decision between you and your healthcare provider will decide whether screening between the ages of 74-80 would be appropriate  Colonoscopy: 07/12/1999  FOBT/FIT: Not on file  Cologuard: Not on file  Sigmoidoscopy: Not on file    Screening Not Indicated     Breast Cancer Screening Age: 36 years old  Frequency: every 1-2 years  Not required if history of left and right mastectomy Mammogram: 02/23/2012        Cervical Cancer Screening Between the ages of 21-29, pap smear recommended once every 3 years  Between the ages of 33-67, can perform pap smear with HPV co-testing every 5 years     Recommendations may differ for women with a history of total hysterectomy, cervical cancer, or abnormal pap smears in past  Pap Smear: Not on file    Screening Not Indicated   Hepatitis C Screening Once for adults born between 1945 and 1965  More frequently in patients at high risk for Hepatitis C Hep C Antibody: Not on file        Diabetes Screening 1-2 times per year if you're at risk for diabetes or have pre-diabetes Fasting glucose: 86 mg/dL   A1C: No results in last 5 years    Screening Current   Cholesterol Screening Once every 5 years if you don't have a lipid disorder  May order more often based on risk factors  Lipid panel: 06/29/2022    Screening Current     Other Preventive Screenings Covered by Medicare:  1  Abdominal Aortic Aneurysm (AAA) Screening: covered once if your at risk  You're considered to be at risk if you have a family history of AAA  2  Lung Cancer Screening: covers low dose CT scan once per year if you meet all of the following conditions: (1) Age 50-69; (2) No signs or symptoms of lung cancer; (3) Current smoker or have quit smoking within the last 15 years; (4) You have a tobacco smoking history of at least 30 pack years (packs per day multiplied by number of years you smoked); (5) You get a written order from a healthcare provider  3  Glaucoma Screening: covered annually if you're considered high risk: (1) You have diabetes OR (2) Family history of glaucoma OR (3)  aged 48 and older OR (3)  American aged 72 and older  3  Osteoporosis Screening: covered every 2 years if you meet one of the following conditions: (1) You're estrogen deficient and at risk for osteoporosis based off medical history and other findings; (2) Have a vertebral abnormality; (3) On glucocorticoid therapy for more than 3 months; (4) Have primary hyperparathyroidism; (5) On osteoporosis medications and need to assess response to drug therapy  · Last bone density test (DXA Scan): Not on file  5  HIV Screening: covered annually if you're between the age of 12-76  Also covered annually if you are younger than 13 and older than 72 with risk factors for HIV infection   For pregnant patients, it is covered up to 3 times per pregnancy  Immunizations:  Immunization Recommendations   Influenza Vaccine Annual influenza vaccination during flu season is recommended for all persons aged >= 6 months who do not have contraindications   Pneumococcal Vaccine (Prevnar and Pneumovax)  * Prevnar = PCV13  * Pneumovax = PPSV23   Adults 25-60 years old: 1-3 doses may be recommended based on certain risk factors  Adults 72 years old: Prevnar (PCV13) vaccine recommended followed by Pneumovax (PPSV23) vaccine  If already received PPSV23 since turning 65, then PCV13 recommended at least one year after PPSV23 dose  Hepatitis B Vaccine 3 dose series if at intermediate or high risk (ex: diabetes, end stage renal disease, liver disease)   Tetanus (Td) Vaccine - COST NOT COVERED BY MEDICARE PART B Following completion of primary series, a booster dose should be given every 10 years to maintain immunity against tetanus  Td may also be given as tetanus wound prophylaxis  Tdap Vaccine - COST NOT COVERED BY MEDICARE PART B Recommended at least once for all adults  For pregnant patients, recommended with each pregnancy  Shingles Vaccine (Shingrix) - COST NOT COVERED BY MEDICARE PART B  2 shot series recommended in those aged 48 and above     Health Maintenance Due:  There are no preventive care reminders to display for this patient  Immunizations Due:      Topic Date Due    COVID-19 Vaccine (1) Never done    Pneumococcal Vaccine: 65+ Years (2 - PPSV23 or PCV20) 10/31/2017    Influenza Vaccine (Season Ended) 09/01/2022     Advance Directives   What are advance directives? Advance directives are legal documents that state your wishes and plans for medical care  These plans are made ahead of time in case you lose your ability to make decisions for yourself  Advance directives can apply to any medical decision, such as the treatments you want, and if you want to donate organs  What are the types of advance directives?   There are many types of advance directives, and each state has rules about how to use them  You may choose a combination of any of the following:  · Living will: This is a written record of the treatment you want  You can also choose which treatments you do not want, which to limit, and which to stop at a certain time  This includes surgery, medicine, IV fluid, and tube feedings  · Durable power of  for healthcare [facility]): This is a written record that states who you want to make healthcare choices for you when you are unable to make them for yourself  This person, called a proxy, is usually a family member or a friend  You may choose more than 1 proxy  · Do not resuscitate (DNR) order:  A DNR order is used in case your heart stops beating or you stop breathing  It is a request not to have certain forms of treatment, such as CPR  A DNR order may be included in other types of advance directives  · Medical directive: This covers the care that you want if you are in a coma, near death, or unable to make decisions for yourself  You can list the treatments you want for each condition  Treatment may include pain medicine, surgery, blood transfusions, dialysis, IV or tube feedings, and a ventilator (breathing machine)  · Values history: This document has questions about your views, beliefs, and how you feel and think about life  This information can help others choose the care that you would choose  Why are advance directives important? An advance directive helps you control your care  Although spoken wishes may be used, it is better to have your wishes written down  Spoken wishes can be misunderstood, or not followed  Treatments may be given even if you do not want them  An advance directive may make it easier for your family to make difficult choices about your care  © Copyright [employer] 2018 Information is for End User's use only and may not be sold, redistributed or otherwise used for commercial purposes   All illustrations and images included in CareNotes® are the copyrighted property of A D A M , Inc  or Cathryn Gamboa

## 2022-08-22 ENCOUNTER — TELEPHONE (OUTPATIENT)
Dept: INTERNAL MEDICINE CLINIC | Facility: CLINIC | Age: 87
End: 2022-08-22

## 2022-08-22 DIAGNOSIS — N18.4 CHRONIC RENAL DISEASE, STAGE IV (HCC): ICD-10-CM

## 2022-08-22 DIAGNOSIS — L89.309 PRESSURE INJURY OF SKIN OF BUTTOCK, UNSPECIFIED INJURY STAGE, UNSPECIFIED LATERALITY: ICD-10-CM

## 2022-08-22 DIAGNOSIS — M06.9 RHEUMATOID ARTHRITIS INVOLVING MULTIPLE SITES, UNSPECIFIED WHETHER RHEUMATOID FACTOR PRESENT (HCC): Primary | ICD-10-CM

## 2022-08-22 DIAGNOSIS — M48.00 SPINAL STENOSIS, UNSPECIFIED SPINAL REGION: ICD-10-CM

## 2022-08-22 NOTE — TELEPHONE ENCOUNTER
Patient's daughter Roque Ing called stating that patient's feet are swelling and her big toe is oozing, and she has two bed sores on buttocks  She also states that patient's health is failing and she is declining  Also, patient cannot come to the office    Patient [phone]  Rinku Fry [phone]

## 2022-08-22 NOTE — TELEPHONE ENCOUNTER
I called and spoke to patient's daughter Ofelia Hoskins and informed her of your message  Ofelia Hoskins said that coming to office or going to ER "won't happen"  She is requesting that you get home health to evaluate patient

## 2022-08-22 NOTE — TELEPHONE ENCOUNTER
She should be seen  Cannot really assess this over the phone  I can get home health to go out to see her as well  If she feels very poorly, should likely go to the ER to be evaluated

## 2022-09-08 DIAGNOSIS — R60.9 PERIPHERAL EDEMA: ICD-10-CM

## 2022-09-08 RX ORDER — FUROSEMIDE 20 MG/1
20 TABLET ORAL DAILY
Qty: 30 TABLET | Refills: 0 | Status: SHIPPED | OUTPATIENT
Start: 2022-09-08 | End: 2022-10-04

## 2022-10-03 DIAGNOSIS — R60.9 PERIPHERAL EDEMA: ICD-10-CM

## 2022-10-04 RX ORDER — FUROSEMIDE 20 MG/1
TABLET ORAL
Qty: 30 TABLET | Refills: 0 | Status: SHIPPED | OUTPATIENT
Start: 2022-10-04

## 2022-10-12 PROBLEM — R05.9 COUGH: Status: RESOLVED | Noted: 2022-01-11 | Resolved: 2022-10-12

## 2022-11-02 DIAGNOSIS — J30.1 ALLERGIC RHINITIS DUE TO POLLEN, UNSPECIFIED SEASONALITY: ICD-10-CM

## 2022-11-02 DIAGNOSIS — R60.9 PERIPHERAL EDEMA: ICD-10-CM

## 2022-11-02 RX ORDER — MONTELUKAST SODIUM 10 MG/1
TABLET ORAL
Qty: 90 TABLET | Refills: 3 | Status: SHIPPED | OUTPATIENT
Start: 2022-11-02

## 2022-11-03 RX ORDER — FUROSEMIDE 20 MG/1
TABLET ORAL
Qty: 30 TABLET | Refills: 0 | Status: SHIPPED | OUTPATIENT
Start: 2022-11-03

## 2022-11-22 ENCOUNTER — TELEPHONE (OUTPATIENT)
Dept: INTERNAL MEDICINE CLINIC | Facility: CLINIC | Age: 87
End: 2022-11-22

## 2022-11-22 NOTE — TELEPHONE ENCOUNTER
Patients daughter called looking to switch her moms appointment to virtual  She states her mom has been refusing to attend appointments, including with her eye doctor, and is starting to get combative at home  Their  suggested she contact us as well as the office of aging which she also contacted  She reports they feel her mom may be having TIA's  We did suggest they take their mom to the ER, but they refused stating her mom won't go  Did stress the importance of getting checked out at the ED  As Per  Dr Snell Gist ok to switch appt to virtual but Daughter, Doris Fernandez needs to be present as the patients advocate  Doris Fernandez understood and was in agreement

## 2022-12-08 DIAGNOSIS — R60.9 PERIPHERAL EDEMA: ICD-10-CM

## 2022-12-08 RX ORDER — FUROSEMIDE 20 MG/1
TABLET ORAL
Qty: 30 TABLET | Refills: 0 | Status: SHIPPED | OUTPATIENT
Start: 2022-12-08

## 2022-12-12 ENCOUNTER — TELEPHONE (OUTPATIENT)
Dept: INTERNAL MEDICINE CLINIC | Facility: CLINIC | Age: 87
End: 2022-12-12

## 2022-12-13 ENCOUNTER — APPOINTMENT (OUTPATIENT)
Dept: LAB | Facility: HOSPITAL | Age: 87
End: 2022-12-13

## 2022-12-13 ENCOUNTER — OFFICE VISIT (OUTPATIENT)
Dept: INTERNAL MEDICINE CLINIC | Facility: CLINIC | Age: 87
End: 2022-12-13

## 2022-12-13 VITALS
TEMPERATURE: 97.2 F | HEIGHT: 57 IN | BODY MASS INDEX: 23.92 KG/M2 | OXYGEN SATURATION: 99 % | WEIGHT: 110.9 LBS | SYSTOLIC BLOOD PRESSURE: 156 MMHG | DIASTOLIC BLOOD PRESSURE: 84 MMHG | HEART RATE: 109 BPM

## 2022-12-13 DIAGNOSIS — E03.9 HYPOTHYROIDISM, UNSPECIFIED TYPE: ICD-10-CM

## 2022-12-13 DIAGNOSIS — C91.10 CLL (CHRONIC LYMPHOCYTIC LEUKEMIA) (HCC): ICD-10-CM

## 2022-12-13 DIAGNOSIS — M15.9 PRIMARY OSTEOARTHRITIS INVOLVING MULTIPLE JOINTS: ICD-10-CM

## 2022-12-13 DIAGNOSIS — E78.5 DYSLIPIDEMIA: ICD-10-CM

## 2022-12-13 DIAGNOSIS — I10 BENIGN ESSENTIAL HYPERTENSION: Primary | ICD-10-CM

## 2022-12-13 DIAGNOSIS — I48.0 PAROXYSMAL ATRIAL FIBRILLATION (HCC): ICD-10-CM

## 2022-12-13 DIAGNOSIS — N18.4 CHRONIC RENAL DISEASE, STAGE IV (HCC): ICD-10-CM

## 2022-12-13 DIAGNOSIS — I10 BENIGN ESSENTIAL HYPERTENSION: ICD-10-CM

## 2022-12-13 DIAGNOSIS — L03.116 CELLULITIS OF LEFT LOWER LEG: ICD-10-CM

## 2022-12-13 LAB
ALBUMIN SERPL BCP-MCNC: 3.7 G/DL (ref 3.5–5)
ALP SERPL-CCNC: 71 U/L (ref 46–116)
ALT SERPL W P-5'-P-CCNC: 25 U/L (ref 12–78)
ANION GAP SERPL CALCULATED.3IONS-SCNC: 6 MMOL/L (ref 4–13)
AST SERPL W P-5'-P-CCNC: 32 U/L (ref 5–45)
BASOPHILS # BLD MANUAL: 0 THOUSAND/UL (ref 0–0.1)
BASOPHILS NFR MAR MANUAL: 0 % (ref 0–1)
BILIRUB SERPL-MCNC: 0.42 MG/DL (ref 0.2–1)
BUN SERPL-MCNC: 36 MG/DL (ref 5–25)
CALCIUM SERPL-MCNC: 10.3 MG/DL (ref 8.3–10.1)
CHLORIDE SERPL-SCNC: 97 MMOL/L (ref 96–108)
CHOLEST SERPL-MCNC: 252 MG/DL
CO2 SERPL-SCNC: 34 MMOL/L (ref 21–32)
CREAT SERPL-MCNC: 1.89 MG/DL (ref 0.6–1.3)
EOSINOPHIL # BLD MANUAL: 0 THOUSAND/UL (ref 0–0.4)
EOSINOPHIL NFR BLD MANUAL: 0 % (ref 0–6)
ERYTHROCYTE [DISTWIDTH] IN BLOOD BY AUTOMATED COUNT: 12.7 % (ref 11.6–15.1)
GFR SERPL CREATININE-BSD FRML MDRD: 23 ML/MIN/1.73SQ M
GLUCOSE SERPL-MCNC: 96 MG/DL (ref 65–140)
HCT VFR BLD AUTO: 39.1 % (ref 34.8–46.1)
HDLC SERPL-MCNC: 108 MG/DL
HGB BLD-MCNC: 13.1 G/DL (ref 11.5–15.4)
LDLC SERPL CALC-MCNC: 120 MG/DL (ref 0–100)
LYMPHOCYTES # BLD AUTO: 19.16 THOUSAND/UL (ref 0.6–4.47)
LYMPHOCYTES # BLD AUTO: 67 % (ref 14–44)
MCH RBC QN AUTO: 30.9 PG (ref 26.8–34.3)
MCHC RBC AUTO-ENTMCNC: 33.5 G/DL (ref 31.4–37.4)
MCV RBC AUTO: 92 FL (ref 82–98)
MONOCYTES # BLD AUTO: 0.86 THOUSAND/UL (ref 0–1.22)
MONOCYTES NFR BLD: 3 % (ref 4–12)
NEUTROPHILS # BLD MANUAL: 8.58 THOUSAND/UL (ref 1.85–7.62)
NEUTS SEG NFR BLD AUTO: 30 % (ref 43–75)
NONHDLC SERPL-MCNC: 144 MG/DL
PLATELET # BLD AUTO: 221 THOUSANDS/UL (ref 149–390)
PLATELET BLD QL SMEAR: ADEQUATE
PMV BLD AUTO: 11.7 FL (ref 8.9–12.7)
POTASSIUM SERPL-SCNC: 3.4 MMOL/L (ref 3.5–5.3)
PROT SERPL-MCNC: 7.4 G/DL (ref 6.4–8.4)
RBC # BLD AUTO: 4.24 MILLION/UL (ref 3.81–5.12)
RBC MORPH BLD: NORMAL
SODIUM SERPL-SCNC: 137 MMOL/L (ref 135–147)
TRIGL SERPL-MCNC: 121 MG/DL
TSH SERPL DL<=0.05 MIU/L-ACNC: 48.67 UIU/ML (ref 0.45–4.5)
WBC # BLD AUTO: 28.6 THOUSAND/UL (ref 4.31–10.16)

## 2022-12-13 RX ORDER — AZITHROMYCIN 250 MG/1
TABLET, FILM COATED ORAL
Qty: 6 TABLET | Refills: 0 | Status: SHIPPED | OUTPATIENT
Start: 2022-12-13 | End: 2022-12-18

## 2022-12-14 NOTE — PROGRESS NOTES
Assessment/Plan:    No problem-specific Assessment & Plan notes found for this encounter  Diagnoses and all orders for this visit:    Benign essential hypertension  -     CBC and differential; Future  -     Comprehensive metabolic panel; Future    Hypothyroidism, unspecified type  -     CBC and differential; Future  -     TSH, 3rd generation; Future    Paroxysmal atrial fibrillation (HCC)  -     CBC and differential; Future    Primary osteoarthritis involving multiple joints  -     CBC and differential; Future    Chronic renal disease, stage IV (HCC)  -     CBC and differential; Future    CLL (chronic lymphocytic leukemia) (HCC)  -     CBC and differential; Future    Dyslipidemia  -     CBC and differential; Future  -     Comprehensive metabolic panel; Future  -     Lipid panel; Future  -     TSH, 3rd generation; Future    Cellulitis of left lower leg  -     azithromycin (ZITHROMAX) 250 mg tablet; 2 pills today, then one daily for 4 more days   Orders and recommendations as noted above  Discussed with her the daughter's concerns regarding her cognition and possible change in mental status  She denies this and appears appropriate on exam today with no focal neurological deficits  Family issues discussed  Blood pressure mildly elevated today  Follow blood pressure at home  Take medication regularly  Stay adequately hydrated  Watch for any orthostatic symptoms  Currently regular on exam   Recommend follow-up with cardiology  Orthopedic issues discussed  Risks and benefits of Celebrex have been discussed in the past   Especially with renal insufficiency  We will check laboratory testing which she plans on having done today  Continue to follow-up with hematology regarding the CLL  Cholesterol is chronically elevated but declines medication  Continue with calcium and vitamin D supplementation  Left lower extremity swelling discussed  This is chronic  There is some erythema  No significant warmth  She is requesting a Z-Sami since this is worked well for her skin issues on the left lower extremity in the past despite this not being typical treatment  Watch for any worsening  Be very careful to avoid falls  Use cane at all times  We will have her follow-up in about 3 to 5 months or sooner if needed depending on the results of the laboratory testing  Subjective:      Patient ID: Tracy Mcclain is a 80 y o  female  She presents for follow-up  Her family, especially her daughters, have been concerned about potential neurological issues and her mental status  They were concerned about her memory and possible "mini strokes "  She denies these issues  She states that she has not been as active since having COVID in the winter  Has noticed that her legs are not as strong and she feels overall more fatigued but continues to be relatively active around her home  Denies any changes in mental status  Did have 1 episode of lightheadedness but she feels this may have been related to blood pressure  Trying to stay well-hydrated  Tolerating her blood pressure medication otherwise well  Appetite has remained very good  Denies any nausea, vomiting, or diarrhea  Denies any chest pain or palpitations  Continues with back and lower extremity issues  Uses a cane at all times  Denies any significant shortness of breath  Denies any chest pain or palpitations  The following portions of the patient's history were reviewed and updated as appropriate:   She  has a past medical history of Carpal tunnel syndrome, Cubital tunnel syndrome on left, Dyslipidemia, Ectopic heartbeats, Hypertension, Malignant melanoma of skin ([facility] [address] ), Premature atrial contraction, and Transient ischemic attack    She   Patient Active Problem List    Diagnosis Date Noted   • Chronic renal disease, stage IV ([address] ) 06/30/2022   • Weakness 01/11/2022   • Bilateral primary osteoarthritis of knee 10/06/2020   • Low back pain 01/27/2020 • Lumbosacral spondylosis without myelopathy 2020   • Degeneration of intervertebral disc 2020   • Cervical stenosis of spinal canal 2020   • Arthritis of ankle 2020   • Arthritis of right knee 2020   • Peripheral edema 2019   • Paroxysmal atrial fibrillation ([facility] ) 2019   • Lung nodules 2019   • Kyphosis 10/23/2018   • Venous stasis 2018   • CKD (chronic kidney disease) stage 3, GFR 30-59 ml/min ([facility]) 2018   • CLL (chronic lymphocytic leukemia) ([place] [address] ) 2018   • History of thoracotomy 2018   • History of D&C 2018   • History of lumpectomy of right breast 2018   • History of pneumothorax 2018   • Hx of emergency  section 2018   •  (spontaneous vaginal delivery) 2018   • Hemoptysis 2018   • Pulmonary disease 2018   • RA (rheumatoid arthritis) ([place] [address] ) 2018   • Cervical spondylosis without myelopathy 2017   • Hypokalemia 2017   • Lumbar stenosis 2017   • Hyponatremia 10/31/2016   • Premature atrial contraction 2016   • Scoliosis, or kyphoscoliosis, idiopathic 2016   • Trochanteric bursitis of both hips 2015   • Dyslipidemia 2014   • Combined disorders of mitral, aortic and tricuspid valves 2014   • Chronic obstructive pulmonary disease ([place] [address] ) 2013   • Pulmonary artery hypertension ([place] [address] ) 2013   • Pulmonary emphysema ([place] [address] ) 2013   • Chronic kidney insufficiency 2012   • Esophageal reflux 2012   • Benign essential hypertension 2012   • Hypothyroidism 2012   • Osteoarthritis 2012   • Spinal stenosis 2012   • History of blood transfusion 1966     She  has a past surgical history that includes Appendectomy; Breast lumpectomy; Cataract extraction;  section; Anterior release vertebral body w/ posterior fusion; Hysterectomy; Lumbar laminectomy; NEUROPLASTY; Oophorectomy;  Thoracoscopy w/ talc pleurodesis; and Tonsillectomy  Her family history includes Cancer in her family; Colon cancer in her mother; Diabetes in her maternal uncle and paternal aunt; Heart disease in her father; Hypertension in her mother; Rheum arthritis in her father; Stroke in her family  She  reports that she has never smoked  She has never used smokeless tobacco  She reports current alcohol use  She reports that she does not use drugs  Current Outpatient Medications   Medication Sig Dispense Refill   • ALPRAZolam (XANAX) 0 25 mg tablet TAKE 1 TABLET TWICE A DAY AS NEEDED FOR ANXIETY 180 tablet 0   • azithromycin (ZITHROMAX) 250 mg tablet 2 pills today, then one daily for 4 more days 6 tablet 0   • budesonide (RINOCORT AQUA) 32 MCG/ACT nasal spray Rhinocort Aqua 32 mcg/actuation nasal spray     • CeleBREX 200 MG capsule TAKE 1 CAPSULE TWICE A  capsule 0   • Cholecalciferol (VITAMIN D3) 2000 units capsule Take by mouth     • coenzyme Q-10 100 MG capsule Take by mouth     • furosemide (LASIX) 20 mg tablet take 1 tablet by mouth daily 30 tablet 0   • montelukast (SINGULAIR) 10 mg tablet TAKE 1 TABLET DAILY 90 tablet 3   • moxifloxacin (VIGAMOX) 0 5 % ophthalmic solution Apply to eye     • olopatadine HCl (PATADAY) 0 2 % opth drops Apply to eye     • potassium chloride (K-DUR,KLOR-CON) 20 mEq tablet Take 1 tablet (20 mEq total) by mouth daily 90 tablet 3   • Premarin 1 25 MG tablet TAKE 1 TABLET DAILY 90 tablet 3   • pyridoxine (VITAMIN B6) 100 mg tablet Take 50 mg by mouth daily       • Synthroid 112 MCG tablet TAKE 1 TABLET DAILY 90 tablet 3   • triamterene-hydrochlorothiazide (MAXZIDE-25) 37 5-25 mg per tablet TAKE 1 TABLET DAILY 90 tablet 3   • Zinc Gluconate 15 MG TABS Take 1 tablet by mouth daily       No current facility-administered medications for this visit       Current Outpatient Medications on File Prior to Visit   Medication Sig   • ALPRAZolam (XANAX) 0 25 mg tablet TAKE 1 TABLET TWICE A DAY AS NEEDED FOR ANXIETY • budesonide (RINOCORT AQUA) 32 MCG/ACT nasal spray Rhinocort Aqua 32 mcg/actuation nasal spray   • CeleBREX 200 MG capsule TAKE 1 CAPSULE TWICE A DAY   • Cholecalciferol (VITAMIN D3) 2000 units capsule Take by mouth   • coenzyme Q-10 100 MG capsule Take by mouth   • furosemide (LASIX) 20 mg tablet take 1 tablet by mouth daily   • montelukast (SINGULAIR) 10 mg tablet TAKE 1 TABLET DAILY   • moxifloxacin (VIGAMOX) 0 5 % ophthalmic solution Apply to eye   • olopatadine HCl (PATADAY) 0 2 % opth drops Apply to eye   • potassium chloride (K-DUR,KLOR-CON) 20 mEq tablet Take 1 tablet (20 mEq total) by mouth daily   • Premarin 1 25 MG tablet TAKE 1 TABLET DAILY   • pyridoxine (VITAMIN B6) 100 mg tablet Take 50 mg by mouth daily     • Synthroid 112 MCG tablet TAKE 1 TABLET DAILY   • triamterene-hydrochlorothiazide (MAXZIDE-25) 37 5-25 mg per tablet TAKE 1 TABLET DAILY   • Zinc Gluconate 15 MG TABS Take 1 tablet by mouth daily     No current facility-administered medications on file prior to visit  She is allergic to aspirin, codeine polt-chlorphen polt er, etanercept, lorazepam, morphine, oxycodone, penicillins, povidone, povidone iodine, and prednisone       Review of Systems   Constitutional: Positive for activity change  Negative for appetite change, chills and fever  HENT: Negative for congestion and rhinorrhea  Eyes: Negative for visual disturbance  Respiratory: Negative for cough, chest tightness and shortness of breath  Cardiovascular: Negative for chest pain and palpitations  Gastrointestinal: Negative for abdominal pain, blood in stool, diarrhea, nausea and vomiting  Endocrine: Negative for polydipsia, polyphagia and polyuria  Genitourinary: Negative for dysuria, frequency and urgency  Musculoskeletal: Positive for arthralgias, back pain and gait problem  Skin: Negative for color change  Neurological: Negative for dizziness and headaches  Hematological: Does not bruise/bleed easily  Psychiatric/Behavioral: Negative for confusion, decreased concentration and sleep disturbance  The patient is not nervous/anxious  Objective:      /84 (BP Location: Left arm, Patient Position: Sitting, Cuff Size: Standard)   Pulse (!) 109   Temp (!) 97 2 °F (36 2 °C)   Ht 4' 9" (1 448 m)   Wt 50 3 kg (110 lb 14 4 oz)   SpO2 99%   BMI 24 00 kg/m²          Physical Exam  Vitals and nursing note reviewed  Constitutional:       General: She is not in acute distress  Appearance: She is well-developed and well-groomed  She is not ill-appearing  HENT:      Head: Normocephalic and atraumatic  Comments: Moist mucous membranes  Eyes:      General:         Right eye: No discharge  Left eye: No discharge  Conjunctiva/sclera: Conjunctivae normal       Pupils: Pupils are equal, round, and reactive to light  Cardiovascular:      Rate and Rhythm: Normal rate and regular rhythm  Heart sounds: Murmur heard  Systolic murmur is present with a grade of 2/6  No friction rub  No gallop  Pulmonary:      Effort: No respiratory distress  Breath sounds: Decreased breath sounds present  No wheezing or rales  Abdominal:      General: Bowel sounds are normal  There is no distension  Tenderness: There is no abdominal tenderness  Musculoskeletal:      Comments: Slow, antalgic gait with cane   Lymphadenopathy:      Cervical: No cervical adenopathy  Skin:     General: Skin is warm and dry  Comments: Some diffuse erythema and small areas of scabbing left lower extremity; no significant warmth; no drainage; 2+ peripheral edema left lower extremity; no significant tenderness   Neurological:      Mental Status: She is alert and oriented to person, place, and time  Psychiatric:         Mood and Affect: Mood and affect normal          Speech: Speech normal          Behavior: Behavior normal  Behavior is cooperative           Cognition and Memory: Cognition and memory normal  She does not exhibit impaired recent memory or impaired remote memory

## 2022-12-15 DIAGNOSIS — E87.6 HYPOKALEMIA: Primary | ICD-10-CM

## 2022-12-15 DIAGNOSIS — E03.9 HYPOTHYROIDISM, UNSPECIFIED TYPE: ICD-10-CM

## 2022-12-15 RX ORDER — LEVOTHYROXINE SODIUM 125 MCG
125 TABLET ORAL
Qty: 90 TABLET | Refills: 3 | Status: SHIPPED | OUTPATIENT
Start: 2022-12-15 | End: 2023-08-01 | Stop reason: SDUPTHER

## 2022-12-15 RX ORDER — POTASSIUM CHLORIDE 750 MG/1
10 CAPSULE, EXTENDED RELEASE ORAL 2 TIMES DAILY
Qty: 180 CAPSULE | Refills: 0 | Status: SHIPPED | OUTPATIENT
Start: 2022-12-15 | End: 2023-09-07 | Stop reason: SDUPTHER

## 2023-01-06 DIAGNOSIS — R60.9 PERIPHERAL EDEMA: ICD-10-CM

## 2023-01-06 NOTE — TELEPHONE ENCOUNTER
Message left for refill  I wanted you to see her message  yLric, this is Julissa  I need a refill on my assist for myson tablets  250 milligrams  My leg is still swollen and oozing  It has quieted down a little bit, but the swelling's gone a little but the oozing is still there  And I have to repeatedly change what I wrap around it  Thank you

## 2023-01-09 ENCOUNTER — NURSE TRIAGE (OUTPATIENT)
Dept: OTHER | Facility: OTHER | Age: 88
End: 2023-01-09

## 2023-01-09 RX ORDER — FUROSEMIDE 20 MG/1
20 TABLET ORAL DAILY
Qty: 30 TABLET | Refills: 0 | Status: SHIPPED | OUTPATIENT
Start: 2023-01-09

## 2023-01-10 DIAGNOSIS — R05.9 COUGH, UNSPECIFIED TYPE: ICD-10-CM

## 2023-01-10 DIAGNOSIS — R60.9 PERIPHERAL EDEMA: Primary | ICD-10-CM

## 2023-01-10 RX ORDER — AZITHROMYCIN 250 MG/1
TABLET, FILM COATED ORAL
Qty: 6 TABLET | Refills: 0 | Status: SHIPPED | OUTPATIENT
Start: 2023-01-10 | End: 2023-01-15

## 2023-02-02 DIAGNOSIS — R60.9 PERIPHERAL EDEMA: ICD-10-CM

## 2023-02-06 RX ORDER — FUROSEMIDE 20 MG/1
TABLET ORAL
Qty: 30 TABLET | Refills: 0 | Status: SHIPPED | OUTPATIENT
Start: 2023-02-06

## 2023-03-03 DIAGNOSIS — R60.9 PERIPHERAL EDEMA: ICD-10-CM

## 2023-03-03 DIAGNOSIS — Z78.0 POSTMENOPAUSAL: ICD-10-CM

## 2023-03-03 RX ORDER — ESTROGENS, CONJUGATED 1.25 MG
TABLET ORAL
Qty: 90 TABLET | Refills: 3 | Status: SHIPPED | OUTPATIENT
Start: 2023-03-03

## 2023-03-05 RX ORDER — FUROSEMIDE 20 MG/1
TABLET ORAL
Qty: 30 TABLET | Refills: 0 | Status: SHIPPED | OUTPATIENT
Start: 2023-03-05

## 2023-03-30 DIAGNOSIS — R60.9 PERIPHERAL EDEMA: ICD-10-CM

## 2023-03-30 RX ORDER — FUROSEMIDE 20 MG/1
20 TABLET ORAL DAILY
Qty: 90 TABLET | Refills: 3 | Status: SHIPPED | OUTPATIENT
Start: 2023-03-30

## 2023-05-03 ENCOUNTER — NURSE TRIAGE (OUTPATIENT)
Dept: OTHER | Facility: OTHER | Age: 88
End: 2023-05-03

## 2023-05-03 NOTE — TELEPHONE ENCOUNTER
"  Reason for Disposition   [1] Red area or streak [2] large (> 2 in  or 5 cm)    Answer Assessment - Initial Assessment Questions  1  ONSET: \"When did the swelling start? \" (e g , minutes, hours, days)       2 days ago    2  LOCATION: \"What part of the leg is swollen? \"  \"Are both legs swollen or just one leg? \"      Both legs just above the ankle to feet    3  SEVERITY: \"How bad is the swelling? \" (e g , localized; mild, moderate, severe)   - Localized - small area of swelling localized to one leg   - MILD pedal edema - swelling limited to foot and ankle, pitting edema < 1/4 inch (6 mm) deep, rest and elevation eliminate most or all swelling   - MODERATE edema - swelling of lower leg to knee, pitting edema > 1/4 inch (6 mm) deep, rest and elevation only partially reduce swelling   - SEVERE edema - swelling extends above knee, facial or hand swelling present       Mild    4  REDNESS: \"Does the swelling look red or infected? \"      RLE was red and weeping from a very small (size of a small pea) open area just above the ankle yesterday  Area has since crusted over and there is no further weeping  5  PAIN: \"Is the swelling painful to touch? \" If Yes, ask: \"How painful is it? \"   (Scale 1-10; mild, moderate or severe)      Mild    6  FEVER: \"Do you have a fever? \" If Yes, ask: \"What is it, how was it measured, and when did it start? \"       Denies     7  CAUSE: \"What do you think is causing the leg swelling? \"      Cellulitis    8  MEDICAL HISTORY: \"Do you have a history of heart failure, kidney disease, liver failure, or cancer? \"      CLL, RA, lung nodules    9  RECURRENT SYMPTOM: \"Have you had leg swelling before? \" If Yes, ask: \"When was the last time? \" \"What happened that time? \"      Yes about 2 years ago and was treated for cellulitis with zithromax by PCP    10  OTHER SYMPTOMS: \"Do you have any other symptoms? \" (e g , chest pain, difficulty breathing)       Denies    Patient is requesting that Zithromax be sent to the " pharmacy for her tomorrow morning  Offered patient appointment for tomorrow but she is declining because she does not think she can get there  Home care advice given  Call back precautions discussed  Patient verbalized understanding and was appreciative      Protocols used: LEG SWELLING AND EDEMA-ADULT-AH

## 2023-05-03 NOTE — TELEPHONE ENCOUNTER
"Regarding: wound / drainage/cellulitis  ----- Message from Silvia Pruitt sent at 5/3/2023  7:05 PM EDT -----  Clark Silverio mother has cellulitis again on her leg and she now has a wound from it she needs some kind of anti biotic\"    "

## 2023-05-04 ENCOUNTER — TELEPHONE (OUTPATIENT)
Dept: INTERNAL MEDICINE CLINIC | Facility: CLINIC | Age: 88
End: 2023-05-04

## 2023-05-04 NOTE — TELEPHONE ENCOUNTER
Pt's dtr Jossy Alonzo called stating her sister made an appt for Poly Dom for tomorrow and she does not have the physical ability to come in. She was asking for Zithromax to be sent in. As per our conversation, informed her that she needs to come in and is she cannot they will need to call 911 and take her to ED.

## 2023-05-05 ENCOUNTER — OFFICE VISIT (OUTPATIENT)
Dept: INTERNAL MEDICINE CLINIC | Facility: CLINIC | Age: 88
End: 2023-05-05

## 2023-05-05 VITALS
TEMPERATURE: 97.9 F | OXYGEN SATURATION: 97 % | BODY MASS INDEX: 25.17 KG/M2 | HEART RATE: 92 BPM | HEIGHT: 57 IN | SYSTOLIC BLOOD PRESSURE: 110 MMHG | DIASTOLIC BLOOD PRESSURE: 80 MMHG | WEIGHT: 116.7 LBS

## 2023-05-05 DIAGNOSIS — L03.115 CELLULITIS OF RIGHT LOWER LEG: Primary | ICD-10-CM

## 2023-05-05 RX ORDER — AZITHROMYCIN 250 MG/1
TABLET, FILM COATED ORAL
Qty: 6 TABLET | Refills: 0 | Status: SHIPPED | OUTPATIENT
Start: 2023-05-05 | End: 2023-05-09

## 2023-05-05 NOTE — PROGRESS NOTES
Name: Sweetie Archuleta      : 1933      MRN: [id number]  Encounter Provider: ILEANA Nettles  Encounter Date: 2023   Encounter department: [address] [place] [address] W  Will start on a Z pack take as directed  Will use non adherent dressing to lower leg  Will keep leg elevated  Will follow up next week for a leg recheck     1  Cellulitis of right lower leg  -     azithromycin (ZITHROMAX) 250 mg tablet; Take 2 tablets today then 1 tablet daily x 4 days           Subjective      Johnnygracia Yates is for an acute visit  She has a history of cellulitis and started with this again this week  She is having redness, warmth and oozing coming from the leg  She denies any fever  She states normally she take a Z pack for this due to not tolerating many antibiotics  She offers no other issues  Review of Systems   Cardiovascular: Positive for leg swelling  Skin: Positive for color change and wound  All other systems reviewed and are negative        Current Outpatient Medications on File Prior to Visit   Medication Sig    ALPRAZolam (XANAX) 0 25 mg tablet TAKE 1 TABLET TWICE A DAY AS NEEDED FOR ANXIETY    budesonide (RINOCORT AQUA) 32 MCG/ACT nasal spray Rhinocort Aqua 32 mcg/actuation nasal spray    CeleBREX 200 MG capsule TAKE 1 CAPSULE TWICE A DAY    Cholecalciferol (VITAMIN D3) 2000 units capsule Take by mouth    coenzyme Q-10 100 MG capsule Take by mouth    furosemide (LASIX) 20 mg tablet Take 1 tablet (20 mg total) by mouth daily    montelukast (SINGULAIR) 10 mg tablet TAKE 1 TABLET DAILY    moxifloxacin (VIGAMOX) 0 5 % ophthalmic solution Apply to eye    olopatadine HCl (PATADAY) 0 2 % opth drops Apply to eye    potassium chloride (MICRO-K) 10 MEQ CR capsule Take 1 capsule (10 mEq total) by mouth 2 (two) times a day    Premarin 1 25 MG tablet TAKE 1 TABLET DAILY    pyridoxine (VITAMIN B6) 100 mg tablet Take 50 mg by mouth daily      Synthroid 125 MCG tablet Take "1 tablet (125 mcg total) by mouth daily in the early morning    triamterene-hydrochlorothiazide (MAXZIDE-25) 37 5-25 mg per tablet TAKE 1 TABLET DAILY    Zinc Gluconate 15 MG TABS Take 1 tablet by mouth daily       Objective     /80   Pulse 92   Temp 97 9 °F (36 6 °C) (Temporal)   Ht 4' 9\" (1 448 m)   Wt 52 9 kg (116 lb 11 2 oz)   SpO2 97%   BMI 25 25 kg/m²     Physical Exam  Vitals reviewed  Constitutional:       Appearance: Normal appearance  She is normal weight  Cardiovascular:      Rate and Rhythm: Normal rate and regular rhythm  Pulses: Normal pulses  Heart sounds: Normal heart sounds  Pulmonary:      Effort: Pulmonary effort is normal       Breath sounds: Normal breath sounds  Musculoskeletal:         General: Swelling present  Normal range of motion  Comments: +2 RLE   Skin:     General: Skin is warm and dry  Capillary Refill: Capillary refill takes less than 2 seconds  Findings: Erythema present  Comments: Small open areas noted to lower shin with bloody drainage noted    Neurological:      General: No focal deficit present  Mental Status: She is alert and oriented to person, place, and time  Mental status is at baseline  Psychiatric:         Mood and Affect: Mood normal          Behavior: Behavior normal          Thought Content:  Thought content normal          Judgment: Judgment normal        ILEANA Champagne  "

## 2023-05-09 ENCOUNTER — OFFICE VISIT (OUTPATIENT)
Dept: INTERNAL MEDICINE CLINIC | Facility: CLINIC | Age: 88
End: 2023-05-09

## 2023-05-09 VITALS — TEMPERATURE: 98.6 F

## 2023-05-09 DIAGNOSIS — L03.115 CELLULITIS OF RIGHT LOWER LEG: Primary | ICD-10-CM

## 2023-05-09 RX ORDER — AZITHROMYCIN 250 MG/1
TABLET, FILM COATED ORAL
Qty: 6 TABLET | Refills: 0 | Status: SHIPPED | OUTPATIENT
Start: 2023-05-09 | End: 2023-05-13

## 2023-05-09 NOTE — PROGRESS NOTES
Name: Lexi Arevalo      : 1933      MRN: [id number]  Encounter Provider: ILEANA Abernathy  Encounter Date: 2023   Encounter department: [address] [place] [address], [address]  Will give another course of a Z pack did advise I would rather start on Doxy but she is refusing and wants another Z pack  Did advise if leg does not get any better will have to switch her medication or be sent to the ER  1  Cellulitis of right lower leg  -     azithromycin (ZITHROMAX) 250 mg tablet; Take 2 tablets today then 1 tablet daily x 4 days           Subjective      Gem Hernandez is for a wound check  She states the area on the leg seems better and feels the swelling is down  She would like another Z pack called back in  She states she last time needed three course to help the leg  She is deferring Doxy or any other antibiotic  She denies any fever  She offers no other issues  Review of Systems   Skin: Positive for color change and wound  All other systems reviewed and are negative        Current Outpatient Medications on File Prior to Visit   Medication Sig   • ALPRAZolam (XANAX) 0 25 mg tablet TAKE 1 TABLET TWICE A DAY AS NEEDED FOR ANXIETY   • budesonide (RINOCORT AQUA) 32 MCG/ACT nasal spray Rhinocort Aqua 32 mcg/actuation nasal spray   • CeleBREX 200 MG capsule TAKE 1 CAPSULE TWICE A DAY   • Cholecalciferol (VITAMIN D3) 2000 units capsule Take by mouth   • coenzyme Q-10 100 MG capsule Take by mouth   • furosemide (LASIX) 20 mg tablet Take 1 tablet (20 mg total) by mouth daily   • montelukast (SINGULAIR) 10 mg tablet TAKE 1 TABLET DAILY   • moxifloxacin (VIGAMOX) 0 5 % ophthalmic solution Apply to eye   • olopatadine HCl (PATADAY) 0 2 % opth drops Apply to eye   • potassium chloride (MICRO-K) 10 MEQ CR capsule Take 1 capsule (10 mEq total) by mouth 2 (two) times a day   • Premarin 1 25 MG tablet TAKE 1 TABLET DAILY   • pyridoxine (VITAMIN B6) 100 mg tablet Take 50 mg by mouth daily • Synthroid 125 MCG tablet Take 1 tablet (125 mcg total) by mouth daily in the early morning   • triamterene-hydrochlorothiazide (MAXZIDE-25) 37 5-25 mg per tablet TAKE 1 TABLET DAILY   • Zinc Gluconate 15 MG TABS Take 1 tablet by mouth daily   • [DISCONTINUED] azithromycin (ZITHROMAX) 250 mg tablet Take 2 tablets today then 1 tablet daily x 4 days       Objective     Temp 98 6 °F (37 °C) (Temporal)     Physical Exam  Vitals reviewed  Constitutional:       Appearance: Normal appearance  She is normal weight  Cardiovascular:      Rate and Rhythm: Normal rate and regular rhythm  Pulses: Normal pulses  Heart sounds: Normal heart sounds  Pulmonary:      Effort: Pulmonary effort is normal       Breath sounds: Normal breath sounds  Musculoskeletal:         General: Swelling and tenderness present  Normal range of motion  Right lower leg: Edema present  Comments: Localized swelling noted with open area noted with some purluent drainage    Skin:     General: Skin is warm and dry  Capillary Refill: Capillary refill takes less than 2 seconds  Findings: Erythema present  Neurological:      General: No focal deficit present  Mental Status: She is alert and oriented to person, place, and time  Mental status is at baseline  Psychiatric:         Mood and Affect: Mood normal          Behavior: Behavior normal          Thought Content:  Thought content normal          Judgment: Judgment normal        ILEANA Diamond

## 2023-06-08 ENCOUNTER — TELEPHONE (OUTPATIENT)
Dept: INTERNAL MEDICINE CLINIC | Facility: CLINIC | Age: 88
End: 2023-06-08

## 2023-06-08 NOTE — TELEPHONE ENCOUNTER
Patient is requesting an appointment with Dr Paige Mae because she states that her leg is still swollen but not her foot and not her ankle  Patient states that she finished 2 antibiotics and that she thinks that she still has cellulitis and wants it checked  I offered her an appointment with Bia Servin as soon as tomorrow but patient refused and stated that she wants to see Dr Paige Mae  I told her that Dr Paige Mae won't be back until next Tuesday and patient said that she'll wait

## 2023-06-21 ENCOUNTER — OFFICE VISIT (OUTPATIENT)
Dept: INTERNAL MEDICINE CLINIC | Facility: CLINIC | Age: 88
End: 2023-06-21
Payer: MEDICARE

## 2023-06-21 VITALS
WEIGHT: 118.9 LBS | SYSTOLIC BLOOD PRESSURE: 124 MMHG | HEART RATE: 105 BPM | HEIGHT: 57 IN | DIASTOLIC BLOOD PRESSURE: 76 MMHG | TEMPERATURE: 98.1 F | BODY MASS INDEX: 25.65 KG/M2 | OXYGEN SATURATION: 92 %

## 2023-06-21 DIAGNOSIS — R60.9 PERIPHERAL EDEMA: ICD-10-CM

## 2023-06-21 DIAGNOSIS — C91.10 CLL (CHRONIC LYMPHOCYTIC LEUKEMIA) (HCC): ICD-10-CM

## 2023-06-21 DIAGNOSIS — M06.9 RHEUMATOID ARTHRITIS INVOLVING MULTIPLE SITES, UNSPECIFIED WHETHER RHEUMATOID FACTOR PRESENT (HCC): ICD-10-CM

## 2023-06-21 DIAGNOSIS — I48.0 PAROXYSMAL ATRIAL FIBRILLATION (HCC): ICD-10-CM

## 2023-06-21 DIAGNOSIS — I89.0 LYMPHEDEMA: Primary | ICD-10-CM

## 2023-06-21 DIAGNOSIS — I27.21 PULMONARY ARTERY HYPERTENSION (HCC): ICD-10-CM

## 2023-06-21 DIAGNOSIS — N18.4 CHRONIC RENAL DISEASE, STAGE IV (HCC): ICD-10-CM

## 2023-06-21 DIAGNOSIS — L03.115 CELLULITIS OF RIGHT LOWER LEG: ICD-10-CM

## 2023-06-21 DIAGNOSIS — J44.9 CHRONIC OBSTRUCTIVE PULMONARY DISEASE, UNSPECIFIED COPD TYPE (HCC): ICD-10-CM

## 2023-06-21 PROCEDURE — 99214 OFFICE O/P EST MOD 30 MIN: CPT | Performed by: FAMILY MEDICINE

## 2023-06-21 RX ORDER — FUROSEMIDE 20 MG/1
40 TABLET ORAL DAILY
Qty: 90 TABLET | Refills: 3 | Status: SHIPPED | OUTPATIENT
Start: 2023-06-21

## 2023-06-21 RX ORDER — DOXYCYCLINE HYCLATE 100 MG/1
100 CAPSULE ORAL EVERY 12 HOURS SCHEDULED
Qty: 14 CAPSULE | Refills: 0 | Status: SHIPPED | OUTPATIENT
Start: 2023-06-21 | End: 2023-06-28

## 2023-06-22 NOTE — PROGRESS NOTES
Assessment/Plan:    No problem-specific Assessment & Plan notes found for this encounter  Diagnoses and all orders for this visit:    Lymphedema    Cellulitis of right lower leg  -     doxycycline hyclate (VIBRAMYCIN) 100 mg capsule; Take 1 capsule (100 mg total) by mouth every 12 (twelve) hours for 7 days    Chronic renal disease, stage IV ([facility])    Peripheral edema  -     furosemide (LASIX) 20 mg tablet; Take 2 tablets (40 mg total) by mouth daily    CLL (chronic lymphocytic leukemia) ([facility])    Rheumatoid arthritis involving multiple sites, unspecified whether rheumatoid factor present ([facility])    Chronic obstructive pulmonary disease, unspecified COPD type ([facility] )    Pulmonary artery hypertension ([facility] )    Paroxysmal atrial fibrillation ([facility] )     Orders and recommendations as noted above  Discussed with her that her legs have significant edema and unusual since it starts suddenly at mid calf area  Elevate legs is much as possible  Tubigrip applied to legs  Recommend using compression  Likely a component of lymphedema given the presentation  Keep skin well moisturized  May be slight component of infection on the right side  Requests the Zithromax but discussed with her that this is not usually effective for skin infections  Prescription given for doxycycline  Advised her to increase her dose of the Lasix to 40 mg for the next 3 days to help with the edema  Watch for any worsening  Be very careful to avoid falls especially with the significant back issues  We will have her follow-up in about 2 months or sooner if needed  May need virtual visit in the meantime to reevaluate legs  Subjective:      Patient ID: Rowan Guy is a 80 y o  female  She presents for problem visit  Has had increasing issues with swelling into her legs right greater than left  She continuously feels that she has cellulitis although has limited redness    Swelling has worsened recently over the mid calf area distally on the right  Painful at times  Denies any fevers or chills  Has now been taking the Lasix more regularly  Increasing difficulty with ambulation  Does not really go out of the house  Has difficulty ambulating even short distances with the persistent back and neck issues  Has not had recent laboratory testing completed  The following portions of the patient's history were reviewed and updated as appropriate:   She  has a past medical history of Carpal tunnel syndrome, Cubital tunnel syndrome on left, Dyslipidemia, Ectopic heartbeats, Hypertension, Malignant melanoma of skin ([facility] ), Premature atrial contraction, and Transient ischemic attack    She   Patient Active Problem List    Diagnosis Date Noted   • Lymphedema 2023   • Cellulitis of right lower leg 2023   • Chronic renal disease, stage IV ([facility] ) 2022   • Weakness 2022   • Bilateral primary osteoarthritis of knee 10/06/2020   • Low back pain 2020   • Lumbosacral spondylosis without myelopathy 2020   • Degeneration of intervertebral disc 2020   • Cervical stenosis of spinal canal 2020   • Arthritis of ankle 2020   • Arthritis of right knee 2020   • Peripheral edema 2019   • [facility]oxysmal [facility] fibrillation [facility] 2019   • Lung nodules 2019   • Kyphosis 10/23/2018   • Venous stasis 2018   • CKD (chronic kidney disease) stage 3, GFR 30-59 ml/min ([facility]) 2018   • CLL (chronic lymphocytic leukemia) [facility] ) 2018   • History of thoracotomy 2018   • History of D&C 2018   • History of lumpectomy of right breast 2018   • History of pneumothorax 2018   • Hx of emergency  section 2018   •  (spontaneous vaginal delivery) 2018   • Hemoptysis 2018   • Pulmonary disease 2018   • RA (rheumatoid arthritis) ([facility] ) 2018   • Cervical spondylosis without myelopathy 2017   • Hypokalemia 2017   • Lumbar stenosis 2017   • Hyponatremia 10/31/2016   • Premature atrial contraction 2016   • Scoliosis, or kyphoscoliosis, idiopathic 2016   • Trochanteric bursitis of both hips 2015   • Dyslipidemia 2014   • Combined disorders of mitral, aortic and tricuspid valves 2014   • Chronic obstructive pulmonary disease ([facility] ) 2013   • Pulmonary artery hypertension [facility] 2013   • Pulmonary emphysema ([facility] ) 2013   • Chronic kidney insufficiency 2012   • Esophageal reflux 2012   • Benign essential hypertension 2012   • Hypothyroidism 2012   • Osteoarthritis 2012   • Spinal stenosis 2012   • History of blood transfusion 1966     She  has a past surgical history that includes Appendectomy; Breast lumpectomy; Cataract extraction;  section; Anterior release vertebral body w/ posterior fusion; Hysterectomy; Lumbar laminectomy; NEUROPLASTY; Oophorectomy; Thoracoscopy w/ talc pleurodesis; and Tonsillectomy  Her family history includes Cancer in her family; Colon cancer in her mother; Diabetes in her maternal uncle and paternal aunt; Heart disease in her father; Hypertension in her mother; Rheum arthritis in her father; Stroke in her family  She  reports that she has never smoked  She has never used smokeless tobacco  She reports current alcohol use  She reports that she does not use drugs    Current Outpatient Medications   Medication Sig Dispense Refill   • doxycycline hyclate (VIBRAMYCIN) 100 mg capsule Take 1 capsule (100 mg total) by mouth every 12 (twelve) hours for 7 days 14 capsule 0   • furosemide (LASIX) 20 mg tablet Take 2 tablets (40 mg total) by mouth daily 90 tablet 3   • ALPRAZolam (XANAX) 0 25 mg tablet TAKE 1 TABLET TWICE A DAY AS NEEDED FOR ANXIETY 180 tablet 0   • budesonide (RINOCORT AQUA) 32 MCG/ACT nasal spray Rhinocort Aqua 32 mcg/actuation nasal spray     • CeleBREX 200 MG capsule TAKE 1 CAPSULE TWICE A  capsule 0   • Cholecalciferol (VITAMIN D3) 2000 units capsule Take by mouth     • coenzyme Q-10 100 MG capsule Take by mouth     • montelukast (SINGULAIR) 10 mg tablet TAKE 1 TABLET DAILY 90 tablet 3   • moxifloxacin (VIGAMOX) 0 5 % ophthalmic solution Apply to eye     • olopatadine HCl (PATADAY) 0 2 % opth drops Apply to eye     • potassium chloride (MICRO-K) 10 MEQ CR capsule Take 1 capsule (10 mEq total) by mouth 2 (two) times a day 180 capsule 0   • Premarin 1 25 MG tablet TAKE 1 TABLET DAILY 90 tablet 3   • pyridoxine (VITAMIN B6) 100 mg tablet Take 50 mg by mouth daily       • Synthroid 125 MCG tablet Take 1 tablet (125 mcg total) by mouth daily in the early morning 90 tablet 3   • triamterene-hydrochlorothiazide (MAXZIDE-25) 37 5-25 mg per tablet TAKE 1 TABLET DAILY 90 tablet 3   • Zinc Gluconate 15 MG TABS Take 1 tablet by mouth daily       No current facility-administered medications for this visit       Current Outpatient Medications on File Prior to Visit   Medication Sig   • ALPRAZolam (XANAX) 0 25 mg tablet TAKE 1 TABLET TWICE A DAY AS NEEDED FOR ANXIETY   • budesonide (RINOCORT AQUA) 32 MCG/ACT nasal spray Rhinocort Aqua 32 mcg/actuation nasal spray   • CeleBREX 200 MG capsule TAKE 1 CAPSULE TWICE A DAY   • Cholecalciferol (VITAMIN D3) 2000 units capsule Take by mouth   • coenzyme Q-10 100 MG capsule Take by mouth   • montelukast (SINGULAIR) 10 mg tablet TAKE 1 TABLET DAILY   • moxifloxacin (VIGAMOX) 0 5 % ophthalmic solution Apply to eye   • olopatadine HCl (PATADAY) 0 2 % opth drops Apply to eye   • potassium chloride (MICRO-K) 10 MEQ CR capsule Take 1 capsule (10 mEq total) by mouth 2 (two) times a day   • Premarin 1 25 MG tablet TAKE 1 TABLET DAILY   • pyridoxine (VITAMIN B6) 100 mg tablet Take 50 mg by mouth daily     • Synthroid 125 MCG tablet Take 1 tablet (125 mcg total) by mouth daily in the early morning   • triamterene-hydrochlorothiazide (MAXZIDE-25) 37 5-25 mg per tablet TAKE 1 TABLET DAILY "  • Zinc Gluconate 15 MG TABS Take 1 tablet by mouth daily     No current facility-administered medications on file prior to visit  She is allergic to aspirin, codeine polt-chlorphen polt er, etanercept, lorazepam, morphine, oxycodone, penicillins, povidone, povidone iodine, and prednisone       Review of Systems   Constitutional: Positive for activity change, appetite change and fatigue  Negative for chills and fever  Respiratory: Negative for cough  Cardiovascular: Positive for leg swelling  Negative for chest pain and palpitations  Musculoskeletal: Positive for arthralgias, back pain, gait problem, neck pain and neck stiffness  Skin: Positive for color change  As per HPI   Psychiatric/Behavioral: Negative for sleep disturbance  Objective:      /76 (BP Location: Left arm, Patient Position: Sitting, Cuff Size: Standard)   Pulse 105   Temp 98 1 °F (36 7 °C)   Ht 4' 9\" (1 448 m)   Wt 53 9 kg (118 lb 14 4 oz)   SpO2 92%   BMI 25 73 kg/m²          Physical Exam  Vitals and nursing note reviewed  Constitutional:       Appearance: She is well-developed and well-groomed  Neck:      Comments: Kyphotic  Cardiovascular:      Rate and Rhythm: Normal rate and regular rhythm  Musculoskeletal:      Cervical back: Muscular tenderness present  Right lower le+ Edema present  Left lower le+ Edema present  Comments: Slow, antalgic gait   Neurological:      Mental Status: She is alert  Psychiatric:         Behavior: Behavior is cooperative           "

## 2023-06-29 ENCOUNTER — TELEPHONE (OUTPATIENT)
Dept: INTERNAL MEDICINE CLINIC | Facility: CLINIC | Age: 88
End: 2023-06-29

## 2023-06-29 NOTE — TELEPHONE ENCOUNTER
Never. Hi, this is Demetria Sharp. I'm calling on behalf of my mother, Marcy Funez. She saw Doctor Hillary Cao last week for her legs and was given a oral antibiotic. She is not responding to it and we were told to call and let Doctor Laura Bradshaw know how she is doing. My mom's name is Martin fraga and her home phone number is [phone]. Thank you. Arlys  called [facility] as per our conversation, and informed her that the Abx Doxy was stronger than the previous one. Sated she is elevating her legs and using the tubigrip. Stated she is not sure if they are getting better. Stated some days she feels they are and then other days not. She still ha 4 Abx tabs left. Stated she will finish the coarse of Abx. She had a virtual scheduled for next week, but requested to come in.   Appt was changed

## 2023-07-05 ENCOUNTER — TELEMEDICINE (OUTPATIENT)
Dept: INTERNAL MEDICINE CLINIC | Facility: CLINIC | Age: 88
End: 2023-07-05
Payer: MEDICARE

## 2023-07-05 DIAGNOSIS — L03.115 BILATERAL CELLULITIS OF LOWER LEG: ICD-10-CM

## 2023-07-05 DIAGNOSIS — I89.0 LYMPHEDEMA: ICD-10-CM

## 2023-07-05 DIAGNOSIS — I87.8 VENOUS STASIS: Primary | ICD-10-CM

## 2023-07-05 DIAGNOSIS — M48.062 SPINAL STENOSIS OF LUMBAR REGION WITH NEUROGENIC CLAUDICATION: ICD-10-CM

## 2023-07-05 DIAGNOSIS — L03.116 BILATERAL CELLULITIS OF LOWER LEG: ICD-10-CM

## 2023-07-05 DIAGNOSIS — M06.9 RHEUMATOID ARTHRITIS INVOLVING MULTIPLE SITES, UNSPECIFIED WHETHER RHEUMATOID FACTOR PRESENT (HCC): ICD-10-CM

## 2023-07-05 PROCEDURE — 99213 OFFICE O/P EST LOW 20 MIN: CPT | Performed by: FAMILY MEDICINE

## 2023-07-05 RX ORDER — AZITHROMYCIN 250 MG/1
TABLET, FILM COATED ORAL
Qty: 6 TABLET | Refills: 0 | Status: SHIPPED | OUTPATIENT
Start: 2023-07-05 | End: 2023-07-10

## 2023-07-06 ENCOUNTER — HOME HEALTH ADMISSION (OUTPATIENT)
Dept: HOME HEALTH SERVICES | Facility: HOME HEALTHCARE | Age: 88
End: 2023-07-06
Payer: MEDICARE

## 2023-07-06 NOTE — PROGRESS NOTES
Virtual Regular Visit    Verification of patient location:    Patient is located at Home in the following state in which I hold an active license [place]      Assessment/Plan:    Problem List Items Addressed This Visit        Musculoskeletal and Integument    RA (rheumatoid arthritis) ([address])    Relevant Orders    Referral to [address]       Other    Lumbar stenosis    Relevant Orders    Referral to [address] [facility]    Venous stasis - Primary    Relevant Medications    azithromycin (ZITHROMAX) 250 mg tablet    Other Relevant Orders    Referral to [address]    Bilateral cellulitis of lower leg    Relevant Medications    azithromycin (ZITHROMAX) 250 mg tablet    Other Relevant Orders    Referral to [address] [facility]    Lymphedema    Relevant Medications    azithromycin (ZITHROMAX) 250 mg tablet    Other Relevant Orders    Referral to [address]     Orders and recommendations as noted above. Persistent swelling and redness discussed. Symptoms seem most consistent with lymphedema and venous stasis. Difficult to assess for any definite cellulitis on virtual visit but she was unable to come to the office for an in person evaluation. Use of the Zithromax discussed. Limitation of this antibiotic discussed. She, however, feels this is the only antibiotic that is adequate to help her symptoms. Elevate legs is much as possible. Continue to use compression. Difficulty with ambulation and worsening functional status discussed. We will have home health evaluate and treat. Likely needs potentially wrapping of her legs and definitely needs therapy for assistance with ambulation. We will have her follow-up as previously scheduled or sooner if needed. BMI Counseling: There is no height or weight on file to calculate BMI.  The BMI is above normal. Nutrition recommendations include encouraging healthy choices of fruits and vegetables, decreasing fast food intake, consuming healthier snacks, limiting drinks that contain sugar, moderation in carbohydrate intake and reducing intake of cholesterol. Rationale for BMI follow-up plan is due to patient being overweight or obese. Reason for visit is   Chief Complaint   Patient presents with   • Virtual Regular Visit     Still having leg issues    • Virtual Regular Visit        Encounter provider Karlo Hernandez MD    Provider located at [address]  [address] [address] [address]      Recent Visits  Date Type Provider Dept   07/05/23 [address] [place] [address] MD Abe [facility]   06/29/23 Telephone Jeffrey Torres  [facility]   Showing recent visits within past 7 days and meeting all other requirements  Future Appointments  No visits were found meeting these conditions. Showing future appointments within next 150 days and meeting all other requirements       The patient was identified by name and date of birth. Paul Daley was informed that this is a telemedicine visit and that the visit is being conducted through the Cameo. She agrees to proceed. .  My office door was closed. No one else was in the room. She acknowledged consent and understanding of privacy and security of the video platform. The patient has agreed to participate and understands they can discontinue the visit at any time. Patient is aware this is a billable service. Subjective  Paul Daley is a 80 y.o. female presenting via virtual visit for persistent lower extremity swelling and redness. She presents for problem visit. Has had persistent lower extremity swelling and redness. Areas become very scaly at times. Has been warm to the touch at times. Denies any significant pain. She does not feel that the doxycycline helped at all. Does feel that the Tubigrip does help somewhat.   She feels that the only medication that helps her is the Zithromax although this is not usually effective for cellulitis or skin infections. Denies any fevers or chills. Denies any significant shortness of breath. Has had increasing difficulty with ambulation both because of the lower extremity swelling as well as the chronic joint and back issues.        Past Medical History:   Diagnosis Date   • Carpal tunnel syndrome     unspecified , lateraly ,   resolved 2014   • Cubital tunnel syndrome on left     resolved 2014   • Dyslipidemia    • Ectopic heartbeats     resolved 2014   • Hypertension    • Malignant melanoma of skin ([address])     resolved 2014   • Premature atrial contraction    • Transient ischemic attack     resolved 2014       Past Surgical History:   Procedure Laterality Date   • ANTERIOR RELEASE VERTEBRAL BODY W/ POSTERIOR FUSION     • APPENDECTOMY     • BREAST LUMPECTOMY     • CATARACT EXTRACTION     •  SECTION     • HYSTERECTOMY     • LUMBAR LAMINECTOMY     • NEUROPLASTY      decompression medial nerve at carpal tunnel    • OOPHORECTOMY     • THORACOSCOPY W/ TALC PLEURODESIS     • TONSILLECTOMY         Current Outpatient Medications   Medication Sig Dispense Refill   • azithromycin (ZITHROMAX) 250 mg tablet 2 pills today, then one daily for 4 more days 6 tablet 0   • ALPRAZolam (XANAX) 0.25 mg tablet TAKE 1 TABLET TWICE A DAY AS NEEDED FOR ANXIETY 180 tablet 0   • budesonide (RINOCORT AQUA) 32 MCG/ACT nasal spray Rhinocort Aqua 32 mcg/actuation nasal spray     • CeleBREX 200 MG capsule TAKE 1 CAPSULE TWICE A  capsule 0   • Cholecalciferol (VITAMIN D3) 2000 units capsule Take by mouth     • coenzyme Q-10 100 MG capsule Take by mouth     • furosemide (LASIX) 20 mg tablet Take 2 tablets (40 mg total) by mouth daily 90 tablet 3   • montelukast (SINGULAIR) 10 mg tablet TAKE 1 TABLET DAILY 90 tablet 3   • moxifloxacin (VIGAMOX) 0.5 % ophthalmic solution Apply to eye     • olopatadine HCl (PATADAY) 0.2 % opth drops Apply to eye     • potassium chloride (MICRO-K) 10 MEQ CR capsule Take 1 capsule (10 mEq total) by mouth 2 (two) times a day 180 capsule 0   • Premarin 1.25 MG tablet TAKE 1 TABLET DAILY 90 tablet 3   • pyridoxine (VITAMIN B6) 100 mg tablet Take 50 mg by mouth daily       • Synthroid 125 MCG tablet Take 1 tablet (125 mcg total) by mouth daily in the early morning 90 tablet 3   • triamterene-hydrochlorothiazide (MAXZIDE-25) 37.5-25 mg per tablet TAKE 1 TABLET DAILY 90 tablet 3   • Zinc Gluconate 15 MG TABS Take 1 tablet by mouth daily       No current facility-administered medications for this visit. Allergies   Allergen Reactions   • Aspirin    • Codeine Polt-Chlorphen Polt Er    • Etanercept    • Lorazepam    • Morphine    • Oxycodone    • Penicillins    • Povidone Hives     Shaky   • Povidone Iodine    • Prednisone        Review of Systems   Constitutional: Positive for activity change. Negative for appetite change, chills and fever. Cardiovascular: Positive for leg swelling. Negative for chest pain and palpitations. Musculoskeletal: Positive for arthralgias, back pain, gait problem, joint swelling, myalgias, neck pain and neck stiffness. Video Exam    There were no vitals filed for this visit. Physical Exam  Vitals reviewed. Constitutional:       Appearance: She is well-developed and well-groomed. Musculoskeletal:      Right lower le+ Pitting Edema present. Left lower le+ Pitting Edema present. Skin:     Comments: Scaling and slight erythema left lower extremity   Neurological:      Mental Status: She is alert. Psychiatric:         Mood and Affect: Mood and affect normal.         Speech: Speech normal.         Behavior: Behavior is cooperative. Visit Time  Total Visit Duration: 15 minutes.

## 2023-07-07 ENCOUNTER — HOME CARE VISIT (OUTPATIENT)
Dept: HOME HEALTH SERVICES | Facility: HOME HEALTHCARE | Age: 88
End: 2023-07-07

## 2023-07-07 NOTE — CASE COMMUNICATION
Agreeable to [facility] services and no other SN agencies in home: yes    Communication to the physician regarding delay:     Insurance, copays, HB status reviewed: yes    Verified address and phone number: yes    Requested that patient secure pets:  2 cats    Medication bottles and DC instructions in home: yes    Wound care/IV supplies in home: no    CG present to learn:  self  pt is retired nurse    Other concerns patient/family would like to  address at visit:

## 2023-07-08 ENCOUNTER — HOME CARE VISIT (OUTPATIENT)
Dept: HOME HEALTH SERVICES | Facility: HOME HEALTHCARE | Age: 88
End: 2023-07-08

## 2023-07-09 ENCOUNTER — HOME CARE VISIT (OUTPATIENT)
Dept: HOME HEALTH SERVICES | Facility: HOME HEALTHCARE | Age: 88
End: 2023-07-09
Payer: MEDICARE

## 2023-07-09 PROCEDURE — G0299 HHS/HOSPICE OF RN EA 15 MIN: HCPCS

## 2023-07-09 PROCEDURE — 400013 VN SOC

## 2023-07-09 PROCEDURE — 10330081 VN NO-PAY CLAIM PROCEDURE

## 2023-07-10 VITALS
OXYGEN SATURATION: 97 % | RESPIRATION RATE: 18 BRPM | SYSTOLIC BLOOD PRESSURE: 126 MMHG | DIASTOLIC BLOOD PRESSURE: 76 MMHG | HEART RATE: 78 BPM | TEMPERATURE: 98.1 F

## 2023-07-10 NOTE — CASE COMMUNICATION
[facility] admitted your patient to [facility] service with the following disciplines:   SN   REFUSED PT AND MSW   This report is informational only, no responses is needed   Primary focus of home health care. CELLULITIS MANAGEMENT   Patient stated goals of care. EDEMA TO GO DOWN AND PAIN TO GO AWAY   Anticipated visit pattern and next visit date. 0S9. Chris Hugo 1w4  NEXT VISIT [facility]   See medication list - meds in home differ from AVS.  ALL MEDS AT HOME   Significant clinical findings. PAIN ON LEGS AND NECK, DECREASE ENDURANCE   Potential barriers to goal achievement. NA   Other pertinent information. NO OPENINGS NOTED DURING THIS VISIT BUT PLUS 3 EDEMA ON BOTH LEGS   Thank you for allowing us to participate in the care of your patient.

## 2023-07-10 NOTE — CASE COMMUNICATION
[facility] has admitted your patient to [facility] service with the following disciplines:      SN  REFUSED PT AND MSW  This report is informational only, no responses is needed  Primary focus of home health care. CELLULITIS MANAGEMENT  Patient stated goals of care. EDEMA TO GO DOWN AND PAIN TO GO AWAY  Anticipated visit pattern and next visit date. 2W8  NEXT VISIT [facility]  See medication list - meds in home differ from AVS. ALL MEDS  AT HOME  Significant clinical findings. PAIN ON LEGS AND NECK, DECREASE ENDURANCE  Potential barriers to goal achievement. NA  Other pertinent information. NO OPENINGS NOTED DURING THIS VISIT BUT PLUS 3 EDEMA ON BOTH LEGS    Thank you for allowing us to participate in the care of your patient.

## 2023-07-12 ENCOUNTER — HOME CARE VISIT (OUTPATIENT)
Dept: HOME HEALTH SERVICES | Facility: HOME HEALTHCARE | Age: 88
End: 2023-07-12
Payer: MEDICARE

## 2023-07-12 VITALS
HEART RATE: 80 BPM | SYSTOLIC BLOOD PRESSURE: 132 MMHG | RESPIRATION RATE: 20 BRPM | DIASTOLIC BLOOD PRESSURE: 70 MMHG | OXYGEN SATURATION: 99 % | TEMPERATURE: 96.1 F

## 2023-07-12 PROCEDURE — G0299 HHS/HOSPICE OF RN EA 15 MIN: HCPCS

## 2023-07-13 ENCOUNTER — HOME CARE VISIT (OUTPATIENT)
Dept: HOME HEALTH SERVICES | Facility: HOME HEALTHCARE | Age: 88
End: 2023-07-13
Payer: MEDICARE

## 2023-07-13 VITALS — DIASTOLIC BLOOD PRESSURE: 80 MMHG | SYSTOLIC BLOOD PRESSURE: 120 MMHG | OXYGEN SATURATION: 98 % | HEART RATE: 89 BPM

## 2023-07-13 PROCEDURE — G0151 HHCP-SERV OF PT,EA 15 MIN: HCPCS

## 2023-07-14 NOTE — CASE COMMUNICATION
ANDREA chavira on 7/13/23 for 1wk1 2wk1 1wk2 for gait/transfers/balance training, LE ther ex, edu, fall prevention.      Thank you, Mattie Lema PT

## 2023-07-17 ENCOUNTER — HOME CARE VISIT (OUTPATIENT)
Dept: HOME HEALTH SERVICES | Facility: HOME HEALTHCARE | Age: 88
End: 2023-07-17
Payer: MEDICARE

## 2023-07-17 ENCOUNTER — TELEPHONE (OUTPATIENT)
Dept: INTERNAL MEDICINE CLINIC | Facility: CLINIC | Age: 88
End: 2023-07-17

## 2023-07-17 VITALS — HEART RATE: 70 BPM | SYSTOLIC BLOOD PRESSURE: 128 MMHG | DIASTOLIC BLOOD PRESSURE: 70 MMHG | OXYGEN SATURATION: 96 %

## 2023-07-17 PROCEDURE — G0180 MD CERTIFICATION HHA PATIENT: HCPCS | Performed by: FAMILY MEDICINE

## 2023-07-17 PROCEDURE — G0151 HHCP-SERV OF PT,EA 15 MIN: HCPCS

## 2023-07-17 NOTE — TELEPHONE ENCOUNTER
Camila from PT called- Jaime Apple reported her legs were swollen. She stated right is worse then left, they are hard and right has visual water blisters but are not yet wheeping. Patient states legs looked better while taking antibiotics. Camila is going to tiger text you pictures of her legs.

## 2023-07-17 NOTE — TELEPHONE ENCOUNTER
As discussed with her previously, azithromycin is not adequate for skin infections, especially those ones involving cat bites. She should be evaluated in the ER since may need IV antibiotics since she has been on numerous (almost constant) oral antibiotics.

## 2023-07-17 NOTE — TELEPHONE ENCOUNTER
I called [facility] as per conversation with Mitul Larios. She stated she will not go. Stated she is not spending the day in the ED. Stated the Zithromax helped her legs and she finished the coarse. I asked her about he cat bite and stated the cat always bites.  Maurizio is asking for a refill on Zithromax 35508 Ophthalmic US, diagnostic; B-scan (Retinal, Vitreous/ICP eval)

## 2023-07-18 ENCOUNTER — HOME CARE VISIT (OUTPATIENT)
Dept: HOME HEALTH SERVICES | Facility: HOME HEALTHCARE | Age: 88
End: 2023-07-18
Payer: MEDICARE

## 2023-07-18 VITALS
HEART RATE: 74 BPM | DIASTOLIC BLOOD PRESSURE: 74 MMHG | TEMPERATURE: 97.9 F | SYSTOLIC BLOOD PRESSURE: 126 MMHG | RESPIRATION RATE: 20 BRPM | OXYGEN SATURATION: 96 %

## 2023-07-18 PROCEDURE — G0300 HHS/HOSPICE OF LPN EA 15 MIN: HCPCS

## 2023-07-18 NOTE — CASE COMMUNICATION
Cat bite on L wrist - refer to Media/LDA's. added careplan to monitor. BLE pink/hard swelling, R >L. R LE water blisters. Pt reports limited BLE elevation; tends to be sitting at computer for periods of time & intermittently falls asleep sitting in computer chair. Pt reports doffed stockingettes as making it worse. Pt declined ER despite recommendations per [facility]. Please informed us if any recommendations for either.      Thank you, Delilah Bence Bentley Hale PT

## 2023-07-20 ENCOUNTER — TELEPHONE (OUTPATIENT)
Dept: INTERNAL MEDICINE CLINIC | Facility: CLINIC | Age: 88
End: 2023-07-20

## 2023-07-20 ENCOUNTER — HOME CARE VISIT (OUTPATIENT)
Dept: HOME HEALTH SERVICES | Facility: HOME HEALTHCARE | Age: 88
End: 2023-07-20
Payer: MEDICARE

## 2023-07-20 VITALS — HEART RATE: 84 BPM | DIASTOLIC BLOOD PRESSURE: 70 MMHG | SYSTOLIC BLOOD PRESSURE: 120 MMHG | OXYGEN SATURATION: 93 %

## 2023-07-20 PROCEDURE — G0151 HHCP-SERV OF PT,EA 15 MIN: HCPCS

## 2023-07-20 NOTE — CASE COMMUNICATION
Pt continues with BLE swelling/blisters. Patient reports intermittent oozing in BLE- pt reports massaging LE's with blisters breaking unintentionally. Pt 's daughter reports pt had pressure ulcer on backside - pt declined for PT to assess-- recommend she have home RN assess. Pt sleeping and spending most of day with LE's down- sleeping in computer chair. Reviewed s/s's of infection, recommend she try not to massage over blisters t o prevent from opening.      Thank you, Chong Larose PT

## 2023-07-20 NOTE — TELEPHONE ENCOUNTER
Patient's daughter Holger Patterson called requesting a virtual visit with Dr. Paul Terrazas for patient's legs which she states are oozing. I told her that it can't be a virtual for something like that and also that Dr. Paul Terrazas has already told patient to go to the ER and that is the advise today as well. Ricky said that patient will not go to ER and I told her to be sure to say that Dr. Paul Terrazas wants her to go. Besides it's late in the day and doctor is done for the week, patient cannot wait until next week to be seen.

## 2023-07-21 ENCOUNTER — HOME CARE VISIT (OUTPATIENT)
Dept: HOME HEALTH SERVICES | Facility: HOME HEALTHCARE | Age: 88
End: 2023-07-21
Payer: MEDICARE

## 2023-07-25 ENCOUNTER — TELEPHONE (OUTPATIENT)
Dept: INTERNAL MEDICINE CLINIC | Facility: CLINIC | Age: 88
End: 2023-07-25

## 2023-07-25 ENCOUNTER — HOME CARE VISIT (OUTPATIENT)
Dept: HOME HEALTH SERVICES | Facility: HOME HEALTHCARE | Age: 88
End: 2023-07-25
Payer: MEDICARE

## 2023-07-25 VITALS
OXYGEN SATURATION: 97 % | HEART RATE: 82 BPM | SYSTOLIC BLOOD PRESSURE: 108 MMHG | RESPIRATION RATE: 18 BRPM | TEMPERATURE: 97.2 F | DIASTOLIC BLOOD PRESSURE: 62 MMHG

## 2023-07-25 PROCEDURE — G0299 HHS/HOSPICE OF RN EA 15 MIN: HCPCS

## 2023-07-25 NOTE — TELEPHONE ENCOUNTER
Call from [facility] asking for a appt with Dr Bryanna Nolan for Julissa's legs. Stated she sent her pictures. Informed her that we had informed her on several occasions to go to the ED as per Dr Bryanna Nolan and Pt states she is not spending the day there. Pt has refused to see our CRNP, who has available appts sooner than Dr Bryanna Nolan. Pt always states when speaking to her regarding this issue is that she wants a Zpak.   Appt was given for 7/31 in the am

## 2023-07-26 ENCOUNTER — HOME CARE VISIT (OUTPATIENT)
Dept: HOME HEALTH SERVICES | Facility: HOME HEALTHCARE | Age: 88
End: 2023-07-26
Payer: MEDICARE

## 2023-07-26 PROCEDURE — G0151 HHCP-SERV OF PT,EA 15 MIN: HCPCS

## 2023-07-27 ENCOUNTER — LAB REQUISITION (OUTPATIENT)
Dept: LAB | Facility: HOSPITAL | Age: 88
End: 2023-07-27
Payer: MEDICARE

## 2023-07-27 ENCOUNTER — HOME CARE VISIT (OUTPATIENT)
Dept: HOME HEALTH SERVICES | Facility: HOME HEALTHCARE | Age: 88
End: 2023-07-27
Payer: MEDICARE

## 2023-07-27 VITALS
RESPIRATION RATE: 20 BRPM | DIASTOLIC BLOOD PRESSURE: 68 MMHG | HEART RATE: 72 BPM | OXYGEN SATURATION: 98 % | SYSTOLIC BLOOD PRESSURE: 116 MMHG | TEMPERATURE: 96.7 F

## 2023-07-27 VITALS — DIASTOLIC BLOOD PRESSURE: 80 MMHG | HEART RATE: 94 BPM | OXYGEN SATURATION: 95 % | SYSTOLIC BLOOD PRESSURE: 130 MMHG

## 2023-07-27 DIAGNOSIS — E03.9 HYPOTHYROIDISM, UNSPECIFIED: ICD-10-CM

## 2023-07-27 DIAGNOSIS — E87.6 HYPOKALEMIA: ICD-10-CM

## 2023-07-27 LAB
ANION GAP SERPL CALCULATED.3IONS-SCNC: 12 MMOL/L
BUN SERPL-MCNC: 38 MG/DL (ref 5–25)
CALCIUM SERPL-MCNC: 9.6 MG/DL (ref 8.4–10.2)
CHLORIDE SERPL-SCNC: 95 MMOL/L (ref 96–108)
CO2 SERPL-SCNC: 31 MMOL/L (ref 21–32)
CREAT SERPL-MCNC: 1.77 MG/DL (ref 0.6–1.3)
GFR SERPL CREATININE-BSD FRML MDRD: 25 ML/MIN/1.73SQ M
GLUCOSE SERPL-MCNC: 98 MG/DL (ref 65–140)
POTASSIUM SERPL-SCNC: 3.8 MMOL/L (ref 3.5–5.3)
SODIUM SERPL-SCNC: 138 MMOL/L (ref 135–147)
TSH SERPL DL<=0.05 MIU/L-ACNC: 218.35 UIU/ML (ref 0.45–4.5)

## 2023-07-27 PROCEDURE — 84443 ASSAY THYROID STIM HORMONE: CPT | Performed by: FAMILY MEDICINE

## 2023-07-27 PROCEDURE — 80048 BASIC METABOLIC PNL TOTAL CA: CPT | Performed by: FAMILY MEDICINE

## 2023-07-27 PROCEDURE — G0299 HHS/HOSPICE OF RN EA 15 MIN: HCPCS

## 2023-07-31 ENCOUNTER — OFFICE VISIT (OUTPATIENT)
Dept: INTERNAL MEDICINE CLINIC | Facility: CLINIC | Age: 88
End: 2023-07-31
Payer: MEDICARE

## 2023-07-31 VITALS
WEIGHT: 126.9 LBS | SYSTOLIC BLOOD PRESSURE: 142 MMHG | TEMPERATURE: 98.1 F | HEIGHT: 57 IN | DIASTOLIC BLOOD PRESSURE: 84 MMHG | BODY MASS INDEX: 27.38 KG/M2

## 2023-07-31 DIAGNOSIS — I48.0 PAROXYSMAL ATRIAL FIBRILLATION (HCC): ICD-10-CM

## 2023-07-31 DIAGNOSIS — N18.4 CHRONIC RENAL DISEASE, STAGE IV (HCC): ICD-10-CM

## 2023-07-31 DIAGNOSIS — I89.0 LYMPHEDEMA: Primary | ICD-10-CM

## 2023-07-31 DIAGNOSIS — E03.9 HYPOTHYROIDISM, UNSPECIFIED TYPE: ICD-10-CM

## 2023-07-31 PROCEDURE — 99214 OFFICE O/P EST MOD 30 MIN: CPT | Performed by: FAMILY MEDICINE

## 2023-07-31 RX ORDER — DOXYCYCLINE HYCLATE 100 MG/1
100 CAPSULE ORAL EVERY 12 HOURS SCHEDULED
Qty: 14 CAPSULE | Refills: 0 | Status: SHIPPED | OUTPATIENT
Start: 2023-07-31 | End: 2023-08-07

## 2023-07-31 NOTE — PROGRESS NOTES
Assessment/Plan:       1. Lymphedema  -     doxycycline hyclate (VIBRAMYCIN) 100 mg capsule; Take 1 capsule (100 mg total) by mouth every 12 (twelve) hours for 7 days    2. Chronic renal disease, stage IV (HCC)  -     doxycycline hyclate (VIBRAMYCIN) 100 mg capsule; Take 1 capsule (100 mg total) by mouth every 12 (twelve) hours for 7 days  -     Basic metabolic panel; Future    3. Paroxysmal atrial fibrillation (HCC)  -     doxycycline hyclate (VIBRAMYCIN) 100 mg capsule; Take 1 capsule (100 mg total) by mouth every 12 (twelve) hours for 7 days    4. Hypothyroidism, unspecified type  -     TSH, 3rd generation; Future        ***DAXPLAN                Subjective:      Patient ID: Anita Meckel is a 80 y.o. female. ***DAXHPI    {Common ambulatory SmartLinks:49180}    Review of Systems  ***DAXROS      Objective:  /84 (BP Location: Left arm, Patient Position: Sitting, Cuff Size: Large)   Temp 98.1 °F (36.7 °C)   Ht 4' 9" (1.448 m)   Wt 57.6 kg (126 lb 14.4 oz)   BMI 27.46 kg/m²          Physical Exam  ***DAXEXAM    ***DAXRESULTS        Transcribed for Gerber Pierce MD, by *** on 07/31/23 at 9:14 AM. Powered by Jennifer Cruz.

## 2023-07-31 NOTE — PROGRESS NOTES
Assessment/Plan:    No problem-specific Assessment & Plan notes found for this encounter. Diagnoses and all orders for this visit:    Lymphedema  -     doxycycline hyclate (VIBRAMYCIN) 100 mg capsule; Take 1 capsule (100 mg total) by mouth every 12 (twelve) hours for 7 days    Chronic renal disease, stage IV (HCC)  -     doxycycline hyclate (VIBRAMYCIN) 100 mg capsule; Take 1 capsule (100 mg total) by mouth every 12 (twelve) hours for 7 days  -     Basic metabolic panel; Future    Paroxysmal atrial fibrillation (HCC)  -     doxycycline hyclate (VIBRAMYCIN) 100 mg capsule; Take 1 capsule (100 mg total) by mouth every 12 (twelve) hours for 7 days    Hypothyroidism, unspecified type  -     TSH, 3rd generation; Future     Reviewed recent laboratory testing with her. TSH is very significantly elevated over 200. She admits to missing doses of her levothyroxine, likely many doses. Discussed with her the importance of taking this regularly. Discussed that this is likely contributing to her edema. Risks of untreated hypothyroidism discussed. We will recheck TSH in approximately 4 to 6 weeks. Discussed with her the persistent edema, likely worsened from the hypothyroidism, and potential treatment options. She declines compression stockings or Unna boots or Tubigrip. Discussed the importance of elevating her legs. Doxycycline prescribed for slight erythema to the right lower extremity. Likely this is more related to the swelling. Discussed potentially treatment for the lymphedema but this would be difficult for her at present. Continue increased dose of the Lasix. Advised her to take 40 mg in the morning. We will recheck a BMP in about 4 to 6 weeks with a TSH. Subjective:      Patient ID: Ananda Parsons is a 80 y.o. female. She presents for problem visit. Has had persistent swelling of the legs bilaterally.   She has noted that this does not usually include her feet or her ankles and goes from the lower leg to close to the knee. Right is greater than left. Has had an over 10 pound weight gain which she feels is likely related to the fluid. Has been taking the increased dose of the Lasix but has been splitting the dose. Does not notice significant increase in her urination with this. Per home health reports, she does not keep her legs elevated often. Did not tolerate the Tubigrip or any other compression. Denies any fevers or chills. Denies any significant shortness of breath. Has not been taking her levothyroxine regularly. Cannot state how many doses she missed but likely significant. The following portions of the patient's history were reviewed and updated as appropriate:   She  has a past medical history of Carpal tunnel syndrome, Cubital tunnel syndrome on left, Dyslipidemia, Ectopic heartbeats, Hypertension, Malignant melanoma of skin ([address]), Premature atrial contraction, and Transient ischemic attack.   She   Patient Active Problem List    Diagnosis Date Noted   • Lymphedema 06/21/2023   • Bilateral cellulitis of lower leg 05/05/2023   • Chronic renal disease, stage IV ([address]) 06/30/2022   • Weakness 01/11/2022   • Bilateral primary osteoarthritis of knee 10/06/2020   • Low back pain 01/27/2020   • Lumbosacral spondylosis without myelopathy 01/27/2020   • Degeneration of intervertebral disc 01/27/2020   • Cervical stenosis of spinal canal 01/27/2020   • Arthritis of ankle 01/27/2020   • Arthritis of right knee 01/27/2020   • Peripheral edema 02/26/2019   • Paroxysmal atrial fibrillation ([address]) 02/26/2019   • Lung nodules 02/22/2019   • Kyphosis 10/23/2018   • Venous stasis 03/21/2018   • CKD (chronic kidney disease) stage 3, GFR 30-59 ml/min ([facility]) 03/20/2018   • CLL (chronic lymphocytic leukemia) ([address]) 03/20/2018   • History of thoracotomy 03/20/2018   • History of D&C 03/20/2018   • History of lumpectomy of right breast 03/20/2018   • History of pneumothorax 03/20/2018   • Hx of emergency  section 2018   •  (spontaneous vaginal delivery) 2018   • Hemoptysis 2018   • Pulmonary disease 2018   • RA (rheumatoid arthritis) ([address]) 2018   • Cervical spondylosis without myelopathy 2017   • Hypokalemia 2017   • Lumbar stenosis 2017   • Hyponatremia 10/31/2016   • Premature atrial contraction 2016   • Scoliosis, or kyphoscoliosis, idiopathic 2016   • Trochanteric bursitis of both hips 2015   • Dyslipidemia 2014   • Combined disorders of mitral, aortic and tricuspid valves 2014   • Chronic obstructive pulmonary disease ([address]) 2013   • Pulmonary artery hypertension ([address]) 2013   • Pulmonary emphysema ([address]) 2013   • Chronic kidney insufficiency 2012   • Esophageal reflux 2012   • Benign essential hypertension 2012   • Osteoarthritis 2012   • Spinal stenosis 2012   • History of blood transfusion 1966     She  has a past surgical history that includes Appendectomy; Breast lumpectomy; Cataract extraction;  section; Anterior release vertebral body w/ posterior fusion; Hysterectomy; Lumbar laminectomy; NEUROPLASTY; Oophorectomy; Thoracoscopy w/ talc pleurodesis; and Tonsillectomy. Her family history includes Cancer in her family; Colon cancer in her mother; Diabetes in her maternal uncle and paternal aunt; Heart disease in her father; Hypertension in her mother; Rheum arthritis in her father; Stroke in her family. She  reports that she has never smoked. She has never used smokeless tobacco. She reports current alcohol use. She reports that she does not use drugs.   Current Outpatient Medications   Medication Sig Dispense Refill   • doxycycline hyclate (VIBRAMYCIN) 100 mg capsule Take 1 capsule (100 mg total) by mouth every 12 (twelve) hours for 7 days 14 capsule 0   • ALPRAZolam (XANAX) 0.25 mg tablet TAKE 1 TABLET TWICE A DAY AS NEEDED FOR ANXIETY 180 tablet 0   • budesonide (RINOCORT AQUA) 32 MCG/ACT nasal spray Rhinocort Aqua 32 mcg/actuation nasal spray     • CeleBREX 200 MG capsule TAKE 1 CAPSULE TWICE A DAY (Patient taking differently: TAKE 1 CAPSULE TWICE A DAY PO) 180 capsule 0   • Cholecalciferol (VITAMIN D3) 2000 units capsule Take 2,000 Units by mouth daily     • coenzyme Q-10 100 MG capsule Take 100 mg by mouth in the morning     • furosemide (LASIX) 20 mg tablet Take 2 tablets (40 mg total) by mouth daily (Patient taking differently: Take 1 tablet by mouth 2 (two) times a day. Indications: Edema) 90 tablet 3   • montelukast (SINGULAIR) 10 mg tablet TAKE 1 TABLET DAILY (Patient taking differently: TAKE 1 TABLET DAILY po) 90 tablet 3   • moxifloxacin (VIGAMOX) 0.5 % ophthalmic solution Administer 1 drop to both eyes in the morning     • olopatadine HCl (PATADAY) 0.2 % opth drops Administer 1 drop to both eyes in the morning BOTH EYES     • potassium chloride (MICRO-K) 10 MEQ CR capsule Take 1 capsule (10 mEq total) by mouth 2 (two) times a day 180 capsule 0   • Premarin 1.25 MG tablet TAKE 1 TABLET DAILY (Patient taking differently: TAKE 1 TABLET DAILY PO) 90 tablet 3   • pyridoxine (VITAMIN B6) 100 mg tablet Take 50 mg by mouth daily       • Synthroid 125 MCG tablet Take 1 tablet (125 mcg total) by mouth daily in the early morning 90 tablet 3   • triamterene-hydrochlorothiazide (MAXZIDE-25) 37.5-25 mg per tablet TAKE 1 TABLET DAILY (Patient not taking: No sig reported) 90 tablet 3   • Zinc Gluconate 15 MG TABS Take 1 tablet by mouth daily       No current facility-administered medications for this visit.      Current Outpatient Medications on File Prior to Visit   Medication Sig   • ALPRAZolam (XANAX) 0.25 mg tablet TAKE 1 TABLET TWICE A DAY AS NEEDED FOR ANXIETY   • budesonide (RINOCORT AQUA) 32 MCG/ACT nasal spray Rhinocort Aqua 32 mcg/actuation nasal spray   • CeleBREX 200 MG capsule TAKE 1 CAPSULE TWICE A DAY (Patient taking differently: TAKE 1 CAPSULE TWICE A DAY PO)   • Cholecalciferol (VITAMIN D3) 2000 units capsule Take 2,000 Units by mouth daily   • coenzyme Q-10 100 MG capsule Take 100 mg by mouth in the morning   • furosemide (LASIX) 20 mg tablet Take 2 tablets (40 mg total) by mouth daily (Patient taking differently: Take 1 tablet by mouth 2 (two) times a day. Indications: Edema)   • montelukast (SINGULAIR) 10 mg tablet TAKE 1 TABLET DAILY (Patient taking differently: TAKE 1 TABLET DAILY po)   • moxifloxacin (VIGAMOX) 0.5 % ophthalmic solution Administer 1 drop to both eyes in the morning   • olopatadine HCl (PATADAY) 0.2 % opth drops Administer 1 drop to both eyes in the morning BOTH EYES   • potassium chloride (MICRO-K) 10 MEQ CR capsule Take 1 capsule (10 mEq total) by mouth 2 (two) times a day   • Premarin 1.25 MG tablet TAKE 1 TABLET DAILY (Patient taking differently: TAKE 1 TABLET DAILY PO)   • pyridoxine (VITAMIN B6) 100 mg tablet Take 50 mg by mouth daily     • Synthroid 125 MCG tablet Take 1 tablet (125 mcg total) by mouth daily in the early morning   • triamterene-hydrochlorothiazide (MAXZIDE-25) 37.5-25 mg per tablet TAKE 1 TABLET DAILY (Patient not taking: No sig reported)   • Zinc Gluconate 15 MG TABS Take 1 tablet by mouth daily     No current facility-administered medications on file prior to visit. She is allergic to aspirin, codeine polt-chlorphen polt er, etanercept, lorazepam, morphine, oxycodone, penicillins, povidone, povidone iodine, and prednisone. .    Review of Systems   Constitutional: Positive for activity change and unexpected weight change. Negative for appetite change, chills, fatigue and fever. Respiratory: Negative for cough and shortness of breath. Cardiovascular: Positive for leg swelling. Musculoskeletal: Positive for back pain, gait problem and neck stiffness. Skin: Positive for color change.          Objective:      /84 (BP Location: Left arm, Patient Position: Sitting, Cuff Size: Large)   Temp 98.1 °F (36.7 °C)   Ht 4' 9" (1.448 m)   Wt 57.6 kg (126 lb 14.4 oz)   BMI 27.46 kg/m²          Physical Exam  Vitals and nursing note reviewed. Constitutional:       Appearance: She is well-developed and well-groomed. Cardiovascular:      Rate and Rhythm: Normal rate and regular rhythm. Occasional Extrasystoles are present. Pulmonary:      Breath sounds: No decreased breath sounds, wheezing, rhonchi or rales. Lymphadenopathy:      Comments: Lymphedema changes bilateral lower extremities right greater than left from the ankle area to close to the knee   Skin:     Comments: Dry and scaling areas bilateral lower extremities; no current open areas   Neurological:      Mental Status: She is alert. Psychiatric:         Behavior: Behavior is cooperative.

## 2023-08-01 DIAGNOSIS — E03.9 HYPOTHYROIDISM, UNSPECIFIED TYPE: ICD-10-CM

## 2023-08-01 RX ORDER — LEVOTHYROXINE SODIUM 125 MCG
125 TABLET ORAL
Qty: 90 TABLET | Refills: 3 | Status: SHIPPED | OUTPATIENT
Start: 2023-08-01

## 2023-08-02 ENCOUNTER — HOME CARE VISIT (OUTPATIENT)
Dept: HOME HEALTH SERVICES | Facility: HOME HEALTHCARE | Age: 88
End: 2023-08-02
Payer: MEDICARE

## 2023-08-02 VITALS
RESPIRATION RATE: 20 BRPM | TEMPERATURE: 96.9 F | DIASTOLIC BLOOD PRESSURE: 82 MMHG | HEART RATE: 88 BPM | OXYGEN SATURATION: 99 % | SYSTOLIC BLOOD PRESSURE: 138 MMHG

## 2023-08-02 PROCEDURE — G0299 HHS/HOSPICE OF RN EA 15 MIN: HCPCS

## 2023-08-02 PROCEDURE — G0151 HHCP-SERV OF PT,EA 15 MIN: HCPCS

## 2023-08-04 ENCOUNTER — HOME CARE VISIT (OUTPATIENT)
Dept: HOME HEALTH SERVICES | Facility: HOME HEALTHCARE | Age: 88
End: 2023-08-04
Payer: MEDICARE

## 2023-08-08 ENCOUNTER — HOME CARE VISIT (OUTPATIENT)
Dept: HOME HEALTH SERVICES | Facility: HOME HEALTHCARE | Age: 88
End: 2023-08-08
Payer: MEDICARE

## 2023-08-08 VITALS
DIASTOLIC BLOOD PRESSURE: 62 MMHG | OXYGEN SATURATION: 94 % | RESPIRATION RATE: 18 BRPM | TEMPERATURE: 96.8 F | SYSTOLIC BLOOD PRESSURE: 126 MMHG | HEART RATE: 76 BPM

## 2023-08-08 PROCEDURE — G0299 HHS/HOSPICE OF RN EA 15 MIN: HCPCS

## 2023-08-15 ENCOUNTER — TELEPHONE (OUTPATIENT)
Dept: INTERNAL MEDICINE CLINIC | Facility: CLINIC | Age: 88
End: 2023-08-15

## 2023-08-15 ENCOUNTER — HOME CARE VISIT (OUTPATIENT)
Dept: HOME HEALTH SERVICES | Facility: HOME HEALTHCARE | Age: 88
End: 2023-08-15
Payer: MEDICARE

## 2023-08-15 VITALS
OXYGEN SATURATION: 97 % | HEART RATE: 64 BPM | SYSTOLIC BLOOD PRESSURE: 118 MMHG | RESPIRATION RATE: 20 BRPM | TEMPERATURE: 97.7 F | DIASTOLIC BLOOD PRESSURE: 68 MMHG

## 2023-08-15 PROCEDURE — G0299 HHS/HOSPICE OF RN EA 15 MIN: HCPCS

## 2023-08-15 NOTE — TELEPHONE ENCOUNTER
[facility] called to let you know that they are discharging Kinga Shrestha from there care she is non compliant. She still has swelling in both of her legs and complains of pain being 8-10 out of 10 . Still sits in front of computer for hours with her legs dangling . Taking  meds alprazolam ,celebrex,synthroid all exp by a year or more . There is no pot , hydroclorathazide ,eyedrops in the home which are on her med list and not taking as she should . . just wanted you to know .

## 2023-08-17 DIAGNOSIS — J30.1 ALLERGIC RHINITIS DUE TO POLLEN, UNSPECIFIED SEASONALITY: ICD-10-CM

## 2023-08-17 RX ORDER — MONTELUKAST SODIUM 10 MG/1
TABLET ORAL
Qty: 90 TABLET | Refills: 3 | Status: SHIPPED | OUTPATIENT
Start: 2023-08-17

## 2023-09-07 ENCOUNTER — OFFICE VISIT (OUTPATIENT)
Dept: INTERNAL MEDICINE CLINIC | Facility: CLINIC | Age: 88
End: 2023-09-07
Payer: MEDICARE

## 2023-09-07 VITALS
TEMPERATURE: 98.7 F | DIASTOLIC BLOOD PRESSURE: 86 MMHG | SYSTOLIC BLOOD PRESSURE: 154 MMHG | HEART RATE: 88 BPM | HEIGHT: 57 IN | OXYGEN SATURATION: 98 % | BODY MASS INDEX: 27.49 KG/M2 | WEIGHT: 127.4 LBS

## 2023-09-07 DIAGNOSIS — E03.9 HYPOTHYROIDISM, UNSPECIFIED TYPE: ICD-10-CM

## 2023-09-07 DIAGNOSIS — E78.5 DYSLIPIDEMIA: ICD-10-CM

## 2023-09-07 DIAGNOSIS — N18.30 STAGE 3 CHRONIC KIDNEY DISEASE, UNSPECIFIED WHETHER STAGE 3A OR 3B CKD (HCC): ICD-10-CM

## 2023-09-07 DIAGNOSIS — E87.6 HYPOKALEMIA: ICD-10-CM

## 2023-09-07 DIAGNOSIS — J44.9 CHRONIC OBSTRUCTIVE PULMONARY DISEASE, UNSPECIFIED COPD TYPE (HCC): ICD-10-CM

## 2023-09-07 DIAGNOSIS — F41.9 ANXIETY: ICD-10-CM

## 2023-09-07 DIAGNOSIS — I48.0 PAROXYSMAL ATRIAL FIBRILLATION (HCC): Primary | ICD-10-CM

## 2023-09-07 DIAGNOSIS — I10 BENIGN ESSENTIAL HYPERTENSION: ICD-10-CM

## 2023-09-07 DIAGNOSIS — I89.0 LYMPHEDEMA: ICD-10-CM

## 2023-09-07 DIAGNOSIS — M48.02 CERVICAL STENOSIS OF SPINAL CANAL: ICD-10-CM

## 2023-09-07 PROCEDURE — G0439 PPPS, SUBSEQ VISIT: HCPCS | Performed by: FAMILY MEDICINE

## 2023-09-07 PROCEDURE — 99214 OFFICE O/P EST MOD 30 MIN: CPT | Performed by: FAMILY MEDICINE

## 2023-09-07 RX ORDER — POTASSIUM CHLORIDE 750 MG/1
10 CAPSULE, EXTENDED RELEASE ORAL 2 TIMES DAILY
Qty: 180 CAPSULE | Refills: 0 | Status: SHIPPED | OUTPATIENT
Start: 2023-09-07

## 2023-09-07 RX ORDER — ALPRAZOLAM 0.25 MG/1
0.25 TABLET ORAL 2 TIMES DAILY PRN
Qty: 180 TABLET | Refills: 0 | Status: SHIPPED | OUTPATIENT
Start: 2023-09-07

## 2023-09-07 RX ORDER — AZITHROMYCIN 250 MG/1
TABLET, FILM COATED ORAL
Qty: 6 TABLET | Refills: 0 | Status: SHIPPED | OUTPATIENT
Start: 2023-09-07 | End: 2023-09-12 | Stop reason: SDUPTHER

## 2023-09-07 NOTE — PROGRESS NOTES
Assessment and Plan:     1. Paroxysmal atrial fibrillation (HCC)  Assessment & Plan:  Currently generally regular with a few ectopic beats. Watch for any chest pain or palpitations. Continued follow-up with cardiology is recommended. Orders:  -     CBC and differential; Future    2. Hypokalemia  -     potassium chloride (MICRO-K) 10 MEQ CR capsule; Take 1 capsule (10 mEq total) by mouth 2 (two) times a day  -     CBC and differential; Future  -     Comprehensive metabolic panel; Future    3. Anxiety  -     ALPRAZolam (XANAX) 0.25 mg tablet; Take 1 tablet (0.25 mg total) by mouth 2 (two) times a day as needed for anxiety  -     CBC and differential; Future    4. Benign essential hypertension  Assessment & Plan:  Blood pressure significantly elevated today. Did discuss with her home blood pressure readings which were much better than in the office. Discussed restarting her blood pressure medication but she declines this. She tends to take her medications as she sees needed. Discussed with her the risks of significantly elevated blood pressures. Orders:  -     CBC and differential; Future  -     Comprehensive metabolic panel; Future    5. Chronic obstructive pulmonary disease, unspecified COPD type ([address])  Assessment & Plan:  Current breathing issues especially with the humidity and heat discussed. Discussed staying in air conditioning. Watch for any worsening. Pulse ox did improve significantly after sitting. Limit outdoor activities. Orders:  -     budesonide (RINOCORT AQUA) 32 MCG/ACT nasal spray; 2 sprays into each nostril daily  -     CBC and differential; Future    6.  Stage 3 chronic kidney disease, unspecified whether stage 3a or 3b CKD [facility])  Assessment & Plan:  Lab Results   Component Value Date    EGFR 25 07/27/2023    EGFR 23 12/13/2022    EGFR 28 06/29/2022    CREATININE 1.77 (H) 07/27/2023    CREATININE 1.89 (H) 12/13/2022    CREATININE 1.62 (H) 06/29/2022     Renal insufficiency discussed. Discussed the risks of increasing her Lasix. Should follow-up with nephrology but she declines specialist visits. Avoid nephrotoxins. Orders:  -     CBC and differential; Future  -     Comprehensive metabolic panel; Future    7. Lymphedema  Assessment & Plan:  I advised the patient to put on the Tubigrip to prevent further swelling. Risks and benefits of increase Lasix use discussed. Elevate legs is much as possible which she does not always do. Discussed that some of the edema may be related to her significantly elevated TSH and poor compliance with the levothyroxine. Orders:  -     azithromycin (ZITHROMAX) 250 mg tablet; 2 pills today, then one daily for 4 more days  -     CBC and differential; Future    8. Dyslipidemia  -     CBC and differential; Future  -     Comprehensive metabolic panel; Future  -     Lipid panel; Future    9. Cervical stenosis of spinal canal  -     CBC and differential; Future    10. Hypothyroidism, unspecified type  -     Lipid panel; Future  -     TSH, 3rd generation; Future      Health maintenance. All the prescribed medications where sent in. I advised her to get her blood work done. Concerns regarding her ongoing on-line relationship with someone from [facility] discussed. Is adamant that this is the person that she had met in the past and not related to a possible scam.  Discussed my concerns with her especially with the ongoing issues with scams and financial agreements with people online. We will have her follow-up in about 2 to 3 months or sooner if needed depending on her status as well as her upcoming laboratory testing. Depression Screening and Follow-up Plan: Patient was screened for depression during today's encounter. They screened negative with a PHQ-2 score of 0. History of Present Illness: The patient is an 40-year-old female who presents for a Medicare Wellness Visit.      The patient reported that he leg started to get worse again. She massages her legs when sitting in a computer. She usually put cream or ointment on her legs. She has a blister that is healing. She denies any weakness or headaches. She denies any back pain, chest pain or palpitation. She had no other medical problems    She is worried she is short of breath with the heat and humidity. She denies coughing. She denies any constipation and goes to bathroom once or twice daily. She is off the blood pressure medicines. She has been following her blood pressures at home and states these have been good despite being off of her blood pressure medication. She had azithromycin and doxycycline antibiotics that lasted about 2 to 3 days after completing the courses. She states she is on good condition when she is on antibiotics. She is taking the water pill 40 mg every day. She does not want to increase the dose of the Lasix because she understands the potential kidney risk. In general, her appetite is good and is a big fruit eater. Reynaldo Rhona and hearing is okay but affected after she had COVID. Her tastes and smell is okay. Her oxygen level is at 88. At rest, she is 80. She states her blood pressure is high when she came in today. At home her blood pressure 113/60. Low back issues have been controlled recently but continues with neck pain and stiffness at times. Denies any recent falls. Has had some stressors at home with her 1 son being mostly bedbound and her daughter is being against her having an online "relationship" with and the alleged physician from [facility]. She states that she actually has met this person years ago although her family knows nothing of this. Her family is concerned that this is a potential scam especially since there has been some financial transactions.     Patient Care Team:  Dustin Johansen MD as PCP - General  DO Alejandro Moya Chrys Fordyce, MD   Problem List:     Patient Active Problem List   Diagnosis   • Cervical spondylosis without myelopathy   • Chronic kidney insufficiency   • Chronic obstructive pulmonary disease (HCC)   • CKD (chronic kidney disease) stage 3, GFR 30-59 ml/min ([facility])   • CLL (chronic lymphocytic leukemia) ([facility])   • Combined disorders of mitral, aortic and tricuspid valves   • Dyslipidemia   • Pulmonary emphysema ([facility])   • Esophageal reflux   • History of thoracotomy   • History of blood transfusion   • History of D&C   • History of lumpectomy of right breast   • History of pneumothorax   • Hx of emergency  section   •  (spontaneous vaginal delivery)   • Benign essential hypertension   • Hypokalemia   • Hyponatremia   • Hemoptysis   • Lumbar stenosis   • Pulmonary disease   • RA (rheumatoid arthritis) ([facility])   • Osteoarthritis   • Premature atrial contraction   • Pulmonary artery hypertension ([facility])   • Scoliosis, or kyphoscoliosis, idiopathic   • Spinal stenosis   • Trochanteric bursitis of both hips   • Venous stasis   • Kyphosis   • Lung nodules   • Peripheral edema   • Paroxysmal atrial fibrillation ([facility])   • Low back pain   • Lumbosacral spondylosis without myelopathy   • Degeneration of intervertebral disc   • Cervical stenosis of spinal canal   • Arthritis of ankle   • Arthritis of right knee   • Bilateral primary osteoarthritis of knee   • Weakness   • Chronic renal disease, stage IV ([facility])   • Bilateral cellulitis of lower leg   • Lymphedema      Past Medical and Surgical History:     Past Medical History:   Diagnosis Date   • Carpal tunnel syndrome     unspecified , lateraly ,   resolved 2014   • Cubital tunnel syndrome on left     resolved 2014   • Dyslipidemia    • Ectopic heartbeats     resolved 2014   • Hypertension    • Malignant melanoma of skin ([address])     resolved 2014   • Premature atrial contraction    • Transient ischemic attack     resolved 2014     Past Surgical History:   Procedure Laterality Date   • ANTERIOR RELEASE VERTEBRAL BODY W/ POSTERIOR FUSION     • APPENDECTOMY     • BREAST LUMPECTOMY     • CATARACT EXTRACTION     •  SECTION     • HYSTERECTOMY     • LUMBAR LAMINECTOMY     • NEUROPLASTY      decompression medial nerve at carpal tunnel    • OOPHORECTOMY     • THORACOSCOPY W/ TALC PLEURODESIS     • TONSILLECTOMY        Family History:     Family History   Problem Relation Age of Onset   • Colon cancer Mother    • Hypertension Mother    • Heart disease Father    • Rheum arthritis Father    • Cancer Family    • Stroke Family    • Diabetes Maternal Uncle    • Diabetes Paternal Aunt       Social History:     Social History     Socioeconomic History   • Marital status:      Spouse name: None   • Number of children: None   • Years of education: None   • Highest education level: None   Occupational History   • None   Tobacco Use   • Smoking status: Never   • Smokeless tobacco: Never   Vaping Use   • Vaping Use: Never used   Substance and Sexual Activity   • Alcohol use: Yes     Comment: social   • Drug use: No   • Sexual activity: None   Other Topics Concern   • None   Social History Narrative   • None     Social Determinants of Health     Financial Resource Strain: Low Risk  (2023)    Overall Financial Resource Strain (CARDIA)    • Difficulty of Paying Living Expenses: Not very hard   Food Insecurity: Not on file   Transportation Needs: No Transportation Needs (2023)    PRAPARE - Transportation    • Lack of Transportation (Medical): No    • Lack of Transportation (Non-Medical):  No   Physical Activity: Not on file   Stress: Not on file   Social Connections: Not on file   Intimate Partner Violence: Not on file   Housing Stability: Not on file      Medications and Allergies:     Current Outpatient Medications   Medication Sig Dispense Refill   • ALPRAZolam (XANAX) 0.25 mg tablet Take 1 tablet (0.25 mg total) by mouth 2 (two) times a day as needed for anxiety 180 tablet 0   • azithromycin (ZITHROMAX) 250 mg tablet 2 pills today, then one daily for 4 more days 6 tablet 0   • budesonide (RINOCORT AQUA) 32 MCG/ACT nasal spray 2 sprays into each nostril daily 5 mL 1   • CeleBREX 200 MG capsule TAKE 1 CAPSULE TWICE A  capsule 0   • Cholecalciferol (VITAMIN D3) 2000 units capsule Take 2,000 Units by mouth daily     • coenzyme Q-10 100 MG capsule Take 100 mg by mouth in the morning     • furosemide (LASIX) 20 mg tablet Take 2 tablets (40 mg total) by mouth daily 90 tablet 3   • montelukast (SINGULAIR) 10 mg tablet TAKE 1 TABLET DAILY 90 tablet 3   • potassium chloride (MICRO-K) 10 MEQ CR capsule Take 1 capsule (10 mEq total) by mouth 2 (two) times a day 180 capsule 0   • Premarin 1.25 MG tablet TAKE 1 TABLET DAILY 90 tablet 3   • pyridoxine (VITAMIN B6) 100 mg tablet Take 50 mg by mouth daily       • Synthroid 125 MCG tablet Take 1 tablet (125 mcg total) by mouth daily in the early morning (Patient taking differently: Take 125 mcg by mouth daily in the early morning ONLY  MED IN HOME.  DR. ABRAHAM'S OFFICE NOTIFIED 8/15/23) 90 tablet 3   • Zinc Gluconate 15 MG TABS Take 1 tablet by mouth daily     • triamterene-hydrochlorothiazide (MAXZIDE-25) 37.5-25 mg per tablet TAKE 1 TABLET DAILY (Patient not taking: Reported on 2023) 90 tablet 3     No current facility-administered medications for this visit. Allergies   Allergen Reactions   • Aspirin    • Codeine Polt-Chlorphen Polt Er    • Etanercept    • Lorazepam    • Morphine    • Oxycodone    • Penicillins    • Povidone Hives     Shaky   • Povidone Iodine    • Prednisone       Immunizations:     Immunization History   Administered Date(s) Administered   • INFLUENZA 10/31/2016   • Influenza Split High Dose Preservative Free IM 10/31/2016   • Pneumococcal Conjugate 13-Valent 10/31/2016   • Pneumococcal Polysaccharide PPV23 10/31/2016   • Tdap 10/31/2016      Health Maintenance: There are no preventive care reminders to display for this patient.       Topic Date Due   • COVID-19 Vaccine (1) Never done   • Influenza Vaccine (1) 09/01/2023      Review of Systems:     Review of Systems  Constitutional: Negative for activity change, appetite change, chills and fever. HENT: Negative for congestion and rhinorrhea. Eyes: Negative for visual disturbance. Respiratory: Negative for chest tightness and shortness of breath. Cardiovascular: Negative for chest pain and palpitations. Couple of skipped beats. Gastrointestinal: Negative for abdominal pain, blood in stool, diarrhea, nausea and vomiting. Endocrine: Negative for polydipsia, polyphagia and polyuria. Genitourinary: Negative for dysuria, frequency and urgency. Musculoskeletal: Negative for gait problem. Skin: Negative for color change. Neurological: Negative for dizziness and headaches. Hematological: Does not bruise/bleed easily. Psychiatric/Behavioral: Negative for confusion and sleep disturbance. The patient is not nervous/anxious. Physical Exam:     /86 (BP Location: Right arm, Patient Position: Sitting, Cuff Size: Standard)   Pulse 88   Temp 98.7 °F (37.1 °C)   Ht 4' 9" (1.448 m)   Wt 57.8 kg (127 lb 6.4 oz)   SpO2 98% Comment: After sitting for approximately 15 minutes  BMI 27.57 kg/m²     Physical Exam  Vitals and nursing note reviewed. Constitutional:       General: She is not in acute distress. Appearance: She is well-developed, well-groomed and overweight. HENT:      Head: Normocephalic and atraumatic. Eyes:      Conjunctiva/sclera: Conjunctivae normal.   Neck:      Comments: Kyphosis  Cardiovascular:      Rate and Rhythm: Normal rate and regular rhythm. Occasional Extrasystoles are present. Heart sounds: No murmur heard. Pulmonary:      Effort: Pulmonary effort is normal. No respiratory distress. Breath sounds: Decreased breath sounds present. Abdominal:      Palpations: Abdomen is soft. Tenderness: There is no abdominal tenderness.    Musculoskeletal: General: No swelling. Cervical back: Neck supple. Right lower le+ Pitting Edema present. Left lower le+ Pitting Edema present. Comments: Edema from the upper ankle area to the area just below the knee bilaterally right greater than left   Skin:     General: Skin is warm and dry. Capillary Refill: Capillary refill takes less than 2 seconds. Comments: Dry and scaling skin bilateral lower extremities   Neurological:      Mental Status: She is alert. Psychiatric:         Mood and Affect: Mood and affect normal.         Speech: Speech normal.         Behavior: Behavior is cooperative. Cognition and Memory: Cognition and memory normal.           Medicare Screening Tests and Risk Assessments:     Vicente Blood is here for her Subsequent Wellness visit. Last Medicare Wellness visit information reviewed, patient interviewed and updates made to the record today. Health Risk Assessment:   Patient rates overall health as good. Patient feels that their physical health rating is same. Patient is satisfied with their life. Eyesight was rated as same. Hearing was rated as slightly worse. Patient feels that their emotional and mental health rating is same. Patients states they are never, rarely angry. Patient states they are never, rarely unusually tired/fatigued. Pain experienced in the last 7 days has been a lot. Patient's pain rating has been 10/10. Patient states that she has experienced no weight loss or gain in last 6 months. Depression Screening:   PHQ-2 Score: 0      Fall Risk Screening: In the past year, patient has experienced: no history of falling in past year      Urinary Incontinence Screening:   Patient has not leaked urine accidently in the last six months. Home Safety:  Patient has trouble with stairs inside or outside of their home. Patient has working smoke alarms and has working carbon monoxide detector. Home safety hazards include: none.      Nutrition: Current diet is Regular. Medications:   Patient is currently taking over-the-counter supplements. OTC medications include: see medication list. Patient is able to manage medications. Activities of Daily Living (ADLs)/Instrumental Activities of Daily Living (IADLs):   Walk and transfer into and out of bed and chair?: Yes  Dress and groom yourself?: Yes    Bathe or shower yourself?: Yes    Feed yourself? Yes  Do your laundry/housekeeping?: No  Manage your money, pay your bills and track your expenses?: Yes  Make your own meals?: Yes    Do your own shopping?: No    ADL comments: Children help    Previous Hospitalizations:   Any hospitalizations or ED visits within the last 12 months?: No      Advance Care Planning:   Living will: Yes    Durable POA for healthcare: Yes    Advanced directive: Yes    Provider agrees with end of life decisions: Yes      PREVENTIVE SCREENINGS      Cardiovascular Screening:    General: Screening Current      Diabetes Screening:     General: Screening Current      Colorectal Cancer Screening:     General: Screening Not Indicated      Breast Cancer Screening:     General: Patient Declines      Cervical Cancer Screening:    General: Screening Not Indicated      Osteoporosis Screening:    General: Patient Declines      Abdominal Aortic Aneurysm (AAA) Screening:        General: Screening Not Indicated      Lung Cancer Screening:     General: Screening Not Indicated      Hepatitis C Screening:    General: Screening Not Indicated    Screening, Brief Intervention, and Referral to Treatment (SBIRT)    Screening  Typical number of drinks in a day: 0  Typical number of drinks in a week: 0  Interpretation: Low risk drinking behavior.     Single Item Drug Screening:  How often have you used an illegal drug (including marijuana) or a prescription medication for non-medical reasons in the past year? never    Single Item Drug Screen Score: 0  Interpretation: Negative screen for possible drug use disorder    Brief Intervention  Alcohol & drug use screenings were reviewed. No concerns regarding substance use disorder identified. No results found.     Lam Rutherford MD    Transcribed for Lam Rutherford MD, by Nay Lantigua on 09/07/23 at 9:04 PM. Powered by Jennifer Cruz.

## 2023-09-07 NOTE — ASSESSMENT & PLAN NOTE
I advised the patient to put on the Tubigrip to prevent further swelling. Risks and benefits of increase Lasix use discussed. Elevate legs is much as possible which she does not always do. Discussed that some of the edema may be related to her significantly elevated TSH and poor compliance with the levothyroxine.

## 2023-09-08 NOTE — ASSESSMENT & PLAN NOTE
Blood pressure significantly elevated today. Did discuss with her home blood pressure readings which were much better than in the office. Discussed restarting her blood pressure medication but she declines this. She tends to take her medications as she sees needed. Discussed with her the risks of significantly elevated blood pressures.

## 2023-09-08 NOTE — ASSESSMENT & PLAN NOTE
Current breathing issues especially with the humidity and heat discussed. Discussed staying in air conditioning. Watch for any worsening. Pulse ox did improve significantly after sitting. Limit outdoor activities.

## 2023-09-08 NOTE — ASSESSMENT & PLAN NOTE
Currently generally regular with a few ectopic beats. Watch for any chest pain or palpitations. Continued follow-up with cardiology is recommended.

## 2023-09-08 NOTE — ASSESSMENT & PLAN NOTE
Lab Results   Component Value Date    EGFR 25 07/27/2023    EGFR 23 12/13/2022    EGFR 28 06/29/2022    CREATININE 1.77 (H) 07/27/2023    CREATININE 1.89 (H) 12/13/2022    CREATININE 1.62 (H) 06/29/2022     Renal insufficiency discussed. Discussed the risks of increasing her Lasix. Should follow-up with nephrology but she declines specialist visits. Avoid nephrotoxins.

## 2023-09-12 ENCOUNTER — TELEPHONE (OUTPATIENT)
Dept: INTERNAL MEDICINE CLINIC | Facility: CLINIC | Age: 88
End: 2023-09-12

## 2023-09-12 DIAGNOSIS — I89.0 LYMPHEDEMA: ICD-10-CM

## 2023-09-12 RX ORDER — AZITHROMYCIN 250 MG/1
TABLET, FILM COATED ORAL
Qty: 6 TABLET | Refills: 0 | Status: SHIPPED | OUTPATIENT
Start: 2023-09-12 | End: 2023-09-17

## 2023-09-12 NOTE — TELEPHONE ENCOUNTER
This is [facility]. I'm calling for a refill on my azithromycin, Dr. Liliane Brantley said. She was going to give me a refill, but I have no refills written on my prescription that I already have and I've already taken the last one today. This script was for Azithromycin 250 milligram tablet. My phone number is [phone].  Thank you

## 2023-09-13 NOTE — TELEPHONE ENCOUNTER
We will send in 1 further prescription but then needs to follow-up with wound care. Is she using the Tubigrip? She cannot stay on these antibiotics forever.

## 2023-12-04 DIAGNOSIS — E87.6 HYPOKALEMIA: ICD-10-CM

## 2023-12-05 RX ORDER — POTASSIUM CHLORIDE 750 MG/1
10 CAPSULE, EXTENDED RELEASE ORAL 2 TIMES DAILY
Qty: 180 CAPSULE | Refills: 0 | Status: SHIPPED | OUTPATIENT
Start: 2023-12-05

## 2024-01-01 ENCOUNTER — HOME CARE VISIT (OUTPATIENT)
Dept: HOME HOSPICE | Facility: HOSPICE | Age: 89
End: 2024-01-01
Payer: MEDICARE

## 2024-01-01 ENCOUNTER — TELEPHONE (OUTPATIENT)
Dept: INTERNAL MEDICINE CLINIC | Facility: CLINIC | Age: 89
End: 2024-01-01

## 2024-01-01 ENCOUNTER — HOME CARE VISIT (OUTPATIENT)
Dept: HOME HEALTH SERVICES | Facility: HOME HEALTHCARE | Age: 89
End: 2024-01-01
Payer: MEDICARE

## 2024-01-01 VITALS — HEART RATE: 105 BPM | TEMPERATURE: 97.6 F | RESPIRATION RATE: 17 BRPM

## 2024-01-01 PROCEDURE — G0299 HHS/HOSPICE OF RN EA 15 MIN: HCPCS

## 2024-01-01 PROCEDURE — G0156 HHCP-SVS OF AIDE,EA 15 MIN: HCPCS

## 2024-01-01 PROCEDURE — G0155 HHCP-SVS OF CSW,EA 15 MIN: HCPCS

## 2024-01-04 ENCOUNTER — TELEPHONE (OUTPATIENT)
Dept: INTERNAL MEDICINE CLINIC | Facility: CLINIC | Age: 89
End: 2024-01-04

## 2024-01-04 NOTE — TELEPHONE ENCOUNTER
Dtr called stating she needs to be put on Hospice.  She was asking if you can do anything to get Hospice in for her.  As per our conversation, I did inform her to go to the ER and they can order the Hospice.  She stated mother will refuse and that she wants to die at home.  She asked is there any way to do a virtual visit and order it.  Apparently she was not compliant with her HC in the past.

## 2024-02-02 DIAGNOSIS — Z78.0 POSTMENOPAUSAL: ICD-10-CM

## 2024-02-02 RX ORDER — ESTROGENS, CONJUGATED 1.25 MG
1.25 TABLET ORAL DAILY
Qty: 90 TABLET | Refills: 3 | Status: SHIPPED | OUTPATIENT
Start: 2024-02-02

## 2024-03-04 DIAGNOSIS — E87.6 HYPOKALEMIA: ICD-10-CM

## 2024-03-05 RX ORDER — POTASSIUM CHLORIDE 750 MG/1
10 CAPSULE, EXTENDED RELEASE ORAL 2 TIMES DAILY
Qty: 180 CAPSULE | Refills: 0 | Status: SHIPPED | OUTPATIENT
Start: 2024-03-05

## 2024-03-25 NOTE — TELEPHONE ENCOUNTER
Both dtrs came in asking for help with Julissa's legs.  They were looking for HC.  I informed them that they need a face to face as she has not been seen since 9/23.  She had CX her 11/23 appt.  Informed her that she was DC from prior HC as she was not compliant.  She had refused visits with Sara in the past.  Dtrs stayed at the window and stated they need help with her.  Informed them if she is that bad to call 911 and take her to the hospital.  Stated they cannot transport her.  Stated if the ambulance comes she will refuse.  Both kept complaining that they need help.  Kayla explained it again and so did Bella.  Both kept stating they cannot get her here.  They asked for a virtual, but explained if it is her legs the Dr needs to see.  I asked if she ever followed up with wound care as she was instructed and they stated no, they cannot get her there. They menbtioned that she does not want to leave her computer.  Both then left.

## 2024-03-27 NOTE — TELEPHONE ENCOUNTER
I wish there was something I could do to help but the regulations are very strict regarding face-to-face visits for initiation of home health.  They should likely contact the office of the aging to discuss whether any options are available through there.

## 2024-04-03 ENCOUNTER — TELEPHONE (OUTPATIENT)
Dept: INTERNAL MEDICINE CLINIC | Facility: CLINIC | Age: 89
End: 2024-04-03

## 2024-04-03 NOTE — TELEPHONE ENCOUNTER
Patients daughter called asking for a referral to hospice . Julissa is currently in and out of consciousness . David #[phone] ,women working with the family . Call with any questions

## 2024-04-04 ENCOUNTER — HOME CARE VISIT (OUTPATIENT)
Dept: HOME HEALTH SERVICES | Facility: HOME HEALTHCARE | Age: 89
End: 2024-04-04
Payer: MEDICARE

## 2024-04-04 ENCOUNTER — HOSPICE ADMISSION (OUTPATIENT)
Dept: HOME HOSPICE | Facility: HOSPICE | Age: 89
End: 2024-04-04
Payer: MEDICARE

## 2024-04-04 DIAGNOSIS — L03.115 CELLULITIS OF RIGHT LOWER LEG: ICD-10-CM

## 2024-04-04 DIAGNOSIS — C91.10 CLL (CHRONIC LYMPHOCYTIC LEUKEMIA) (HCC): ICD-10-CM

## 2024-04-04 DIAGNOSIS — M06.9 RHEUMATOID ARTHRITIS INVOLVING MULTIPLE SITES, UNSPECIFIED WHETHER RHEUMATOID FACTOR PRESENT (HCC): Primary | ICD-10-CM

## 2024-04-04 DIAGNOSIS — N18.4 CHRONIC RENAL DISEASE, STAGE IV (HCC): ICD-10-CM

## 2024-04-04 NOTE — Clinical Note
Julissa Ordoñez 89yo female  1933 who I went out to evaluate on Friday. I requested labs be drawn by the mobile lab, they can't get there until . Daughter called in stating her mother is having increased pain and is refusing to go to the hospital. Her wounds looked pretty bad when I was there, can we admit her without the recent labs?

## 2024-04-04 NOTE — Clinical Note
Julissa Ordoñez 89yo female  9.9. currently at home. Hx afib, Anxiety, HTN, COPD, CLL stage O, clinically stable and Lymphedema in her bilateral legs. She is having pain in her legs, they are swollen and draining, foul odor. I am here assessing her now. Last CBC was drawn on 2022 WBC 28.60. Labs on 2023 BUN 38 Creat 1.77 GFR 25. She is currently chair bound, AAOx3. Continent of her bowel and urine. She is currently taking Tylenol, she stated not working. PPS 30. She was walking up until a month ago until her wounds worsened. Approve, RLOC?

## 2024-04-05 DIAGNOSIS — N18.4 CHRONIC RENAL DISEASE, STAGE IV (HCC): Primary | ICD-10-CM

## 2024-04-05 DIAGNOSIS — E87.6 HYPOKALEMIA: ICD-10-CM

## 2024-04-05 DIAGNOSIS — C91.10 CLL (CHRONIC LYMPHOCYTIC LEUKEMIA) (HCC): ICD-10-CM

## 2024-04-05 DIAGNOSIS — M06.9 RHEUMATOID ARTHRITIS INVOLVING MULTIPLE SITES, UNSPECIFIED WHETHER RHEUMATOID FACTOR PRESENT (HCC): ICD-10-CM

## 2024-04-05 DIAGNOSIS — J44.9 CHRONIC OBSTRUCTIVE PULMONARY DISEASE, UNSPECIFIED COPD TYPE (HCC): ICD-10-CM

## 2024-04-08 ENCOUNTER — TELEPHONE (OUTPATIENT)
Dept: LAB | Facility: HOSPITAL | Age: 89
End: 2024-04-08

## 2024-04-08 ENCOUNTER — TELEPHONE (OUTPATIENT)
Dept: INTERNAL MEDICINE CLINIC | Facility: CLINIC | Age: 89
End: 2024-04-08

## 2024-04-09 ENCOUNTER — HOME CARE VISIT (OUTPATIENT)
Dept: HOME HOSPICE | Facility: HOSPICE | Age: 89
End: 2024-04-09
Payer: MEDICARE

## 2024-04-10 ENCOUNTER — HOME CARE VISIT (OUTPATIENT)
Dept: HOME HOSPICE | Facility: HOSPICE | Age: 89
End: 2024-04-10
Payer: MEDICARE

## 2024-04-10 ENCOUNTER — HOME CARE VISIT (OUTPATIENT)
Dept: HOME HEALTH SERVICES | Facility: HOME HEALTHCARE | Age: 89
End: 2024-04-10
Payer: MEDICARE

## 2024-04-10 DIAGNOSIS — I89.0 LYMPHEDEMA: ICD-10-CM

## 2024-04-10 DIAGNOSIS — Z51.5 HOSPICE CARE PATIENT: Primary | ICD-10-CM

## 2024-04-10 PROCEDURE — G0299 HHS/HOSPICE OF RN EA 15 MIN: HCPCS

## 2024-04-10 PROCEDURE — 10330064 DRESSING, HYDROCOLLOID FILM-BCK STR SACR

## 2024-04-10 PROCEDURE — 10330064 BANDAGE, GAUZE COTTON  6PLY STR 4"X4YDS

## 2024-04-10 PROCEDURE — 10330064 GLOVE, EXAM VNYL LG N/S (100/BX 10BX/CS)

## 2024-04-10 PROCEDURE — 10330064 CLEANSER, WND SEA-CLEANS 6OZ  COLPLT

## 2024-04-10 PROCEDURE — 10330064 BRIEF, WINGS CHOICE+ QUILTED LG (18/BG 4

## 2024-04-10 PROCEDURE — 10330064 GLOVE, EXAM VNYL MED N/S (100/BX 10BX/CS

## 2024-04-10 PROCEDURE — 10330064 DRESSING, XEROFORM STR 5"X9" (50/BX)

## 2024-04-10 PROCEDURE — 10330064 SPONGE, GAUZE 8PLY N/S 4"X4" (200/PK 20P

## 2024-04-10 PROCEDURE — 10330064 UNDERPAD, REUSE 36X36 1DZ     BECKCL

## 2024-04-10 PROCEDURE — 10330064 TAPE, RETENTION 2"X10YDS (1/BX24BX/CS)

## 2024-04-10 RX ORDER — HYDROMORPHONE HYDROCHLORIDE 2 MG/1
2 TABLET ORAL EVERY 4 HOURS PRN
Qty: 24 TABLET | Refills: 0 | Status: SHIPPED | OUTPATIENT
Start: 2024-04-10

## 2024-04-10 NOTE — PROGRESS NOTES
4/10/2024 4:20 PM  [facility] home patient requests SOC medications.  Filled electronically via Epic as per [place] State Law.    Requested Prescriptions     Signed Prescriptions Disp Refills    HYDROmorphone (DILAUDID) 2 mg tablet 24 tablet 0     Sig: Take 1 tablet (2 mg total) by mouth every 4 (four) hours as needed for moderate pain or severe pain (pain/dyspnea) 1 tab po q 4 hrs prn pain/dyspnea Max Daily Amount: 12 mg       ILEANA Amanda  [facility]  Hospice Answering Service: [phone]  You can find me on [employer]!

## 2024-04-11 ENCOUNTER — HOME CARE VISIT (OUTPATIENT)
Dept: HOME HEALTH SERVICES | Facility: HOME HEALTHCARE | Age: 89
End: 2024-04-11
Payer: MEDICARE

## 2024-04-11 DIAGNOSIS — Z51.5 HOSPICE CARE PATIENT: ICD-10-CM

## 2024-04-11 DIAGNOSIS — I89.0 LYMPHEDEMA: Primary | ICD-10-CM

## 2024-04-11 PROCEDURE — G0299 HHS/HOSPICE OF RN EA 15 MIN: HCPCS

## 2024-04-11 PROCEDURE — G0156 HHCP-SVS OF AIDE,EA 15 MIN: HCPCS

## 2024-04-11 PROCEDURE — 10330057 MEDICATION, GENERAL

## 2024-04-12 ENCOUNTER — HOME CARE VISIT (OUTPATIENT)
Dept: HOME HEALTH SERVICES | Facility: HOME HEALTHCARE | Age: 89
End: 2024-04-12
Payer: MEDICARE

## 2024-04-12 ENCOUNTER — HOME CARE VISIT (OUTPATIENT)
Dept: HOME HOSPICE | Facility: HOSPICE | Age: 89
End: 2024-04-12
Payer: MEDICARE

## 2024-04-12 VITALS
SYSTOLIC BLOOD PRESSURE: 102 MMHG | HEART RATE: 67 BPM | WEIGHT: 127 LBS | RESPIRATION RATE: 18 BRPM | DIASTOLIC BLOOD PRESSURE: 67 MMHG | TEMPERATURE: 97.2 F | BODY MASS INDEX: 27.48 KG/M2

## 2024-04-12 PROCEDURE — G0299 HHS/HOSPICE OF RN EA 15 MIN: HCPCS

## 2024-04-12 PROCEDURE — G0156 HHCP-SVS OF AIDE,EA 15 MIN: HCPCS

## 2024-04-14 ENCOUNTER — HOME CARE VISIT (OUTPATIENT)
Dept: HOME HOSPICE | Facility: HOSPICE | Age: 89
End: 2024-04-14
Payer: MEDICARE

## 2024-04-14 VITALS — HEART RATE: 80 BPM | TEMPERATURE: 97.8 F | RESPIRATION RATE: 18 BRPM

## 2024-04-14 PROCEDURE — 10330064 DRESSING, XEROFORM STR 5"X9" (50/BX)

## 2024-04-15 ENCOUNTER — HOME CARE VISIT (OUTPATIENT)
Dept: HOME HOSPICE | Facility: HOSPICE | Age: 89
End: 2024-04-15
Payer: MEDICARE

## 2024-04-15 ENCOUNTER — HOME CARE VISIT (OUTPATIENT)
Dept: HOME HEALTH SERVICES | Facility: HOME HEALTHCARE | Age: 89
End: 2024-04-15
Payer: MEDICARE

## 2024-04-15 VITALS — RESPIRATION RATE: 18 BRPM | HEART RATE: 78 BPM

## 2024-04-15 VITALS
SYSTOLIC BLOOD PRESSURE: 148 MMHG | HEART RATE: 100 BPM | TEMPERATURE: 97 F | DIASTOLIC BLOOD PRESSURE: 62 MMHG | RESPIRATION RATE: 18 BRPM

## 2024-04-15 DIAGNOSIS — E03.9 HYPOTHYROIDISM, UNSPECIFIED TYPE: Primary | ICD-10-CM

## 2024-04-15 PROCEDURE — 10330064 PAD, ABD 5X9" STR LF (1/PK 20PK/BX) MGM1

## 2024-04-15 PROCEDURE — 10330064 INSERTION TRAY, FOLEY CATHETER30CC PFS S

## 2024-04-15 PROCEDURE — 10330064 CATH SECURE, STATLOCK FOLEY SWIVEL SIL P

## 2024-04-15 PROCEDURE — 10330064 BANDAGE, CNFRM 4"X4.1YDS N/S LF (12RL/BG

## 2024-04-15 PROCEDURE — 10330064 SPONGE, GAUZE 8PLY N/S 4"X4" (200/PK 20P

## 2024-04-15 PROCEDURE — 10330064 TAPE, ADHSV TRANSPORE WHT 1" (12RL/BX 10

## 2024-04-15 PROCEDURE — 10330064 BAG, URINE DRN (20/CS)        BARD

## 2024-04-15 PROCEDURE — 10330064 DRESSING, XEROFORM STR 5"X9" (50/BX)

## 2024-04-15 PROCEDURE — G0156 HHCP-SVS OF AIDE,EA 15 MIN: HCPCS

## 2024-04-15 PROCEDURE — G0299 HHS/HOSPICE OF RN EA 15 MIN: HCPCS

## 2024-04-15 PROCEDURE — 10330064 SYRINGE, CATH TIP FLAT STR 60CC (50/CS)

## 2024-04-15 PROCEDURE — 10330064 SALINE, IRR SOL STR 100ML (48/CS) MGM37

## 2024-04-15 PROCEDURE — 10330064 CATHETER, FOLEY 2WAY 16FR 30CC(10/BX)

## 2024-04-15 PROCEDURE — 10330064 PAD, HEEL CUSHION ULTRA (1/PR)SKLCRE

## 2024-04-15 PROCEDURE — 10330064 ALIGNER, FOAM BODY SM (8/CS)

## 2024-04-15 RX ORDER — LEVOTHYROXINE SODIUM 125 MCG
125 TABLET ORAL DAILY
Qty: 30 TABLET | Refills: 2 | Status: SHIPPED | OUTPATIENT
Start: 2024-04-15 | End: 2024-04-22 | Stop reason: SDUPTHER

## 2024-04-17 ENCOUNTER — HOME CARE VISIT (OUTPATIENT)
Dept: HOME HEALTH SERVICES | Facility: HOME HEALTHCARE | Age: 89
End: 2024-04-17
Payer: MEDICARE

## 2024-04-17 PROCEDURE — G0156 HHCP-SVS OF AIDE,EA 15 MIN: HCPCS

## 2024-04-17 PROCEDURE — G0299 HHS/HOSPICE OF RN EA 15 MIN: HCPCS

## 2024-04-18 ENCOUNTER — TELEPHONE (OUTPATIENT)
Dept: INTERNAL MEDICINE CLINIC | Facility: CLINIC | Age: 89
End: 2024-04-18

## 2024-04-19 ENCOUNTER — HOME CARE VISIT (OUTPATIENT)
Dept: HOME HEALTH SERVICES | Facility: HOME HEALTHCARE | Age: 89
End: 2024-04-19
Payer: MEDICARE

## 2024-04-19 VITALS — DIASTOLIC BLOOD PRESSURE: 56 MMHG | SYSTOLIC BLOOD PRESSURE: 79 MMHG | HEART RATE: 67 BPM | RESPIRATION RATE: 12 BRPM

## 2024-04-19 VITALS — RESPIRATION RATE: 18 BRPM

## 2024-04-19 PROCEDURE — 10330057 MEDICATION, GENERAL

## 2024-04-19 PROCEDURE — G0299 HHS/HOSPICE OF RN EA 15 MIN: HCPCS

## 2024-04-19 PROCEDURE — G0156 HHCP-SVS OF AIDE,EA 15 MIN: HCPCS

## 2024-04-22 ENCOUNTER — HOME CARE VISIT (OUTPATIENT)
Dept: HOME HEALTH SERVICES | Facility: HOME HEALTHCARE | Age: 89
End: 2024-04-22
Payer: MEDICARE

## 2024-04-22 VITALS
SYSTOLIC BLOOD PRESSURE: 140 MMHG | TEMPERATURE: 97.8 F | HEART RATE: 91 BPM | RESPIRATION RATE: 18 BRPM | DIASTOLIC BLOOD PRESSURE: 80 MMHG

## 2024-04-22 DIAGNOSIS — E03.9 HYPOTHYROIDISM, UNSPECIFIED TYPE: ICD-10-CM

## 2024-04-22 PROCEDURE — 10330057 MEDICATION, GENERAL

## 2024-04-22 PROCEDURE — G0156 HHCP-SVS OF AIDE,EA 15 MIN: HCPCS

## 2024-04-22 PROCEDURE — G0299 HHS/HOSPICE OF RN EA 15 MIN: HCPCS

## 2024-04-22 RX ORDER — LEVOTHYROXINE SODIUM 125 MCG
125 TABLET ORAL DAILY
Qty: 30 TABLET | Refills: 0 | Status: SHIPPED | OUTPATIENT
Start: 2024-04-22

## 2024-04-23 ENCOUNTER — HOME CARE VISIT (OUTPATIENT)
Dept: HOME HOSPICE | Facility: HOSPICE | Age: 89
End: 2024-04-23
Payer: MEDICARE

## 2024-04-23 PROCEDURE — G0155 HHCP-SVS OF CSW,EA 15 MIN: HCPCS

## 2024-04-24 ENCOUNTER — HOME CARE VISIT (OUTPATIENT)
Dept: HOME HEALTH SERVICES | Facility: HOME HEALTHCARE | Age: 89
End: 2024-04-24
Payer: MEDICARE

## 2024-04-24 PROCEDURE — G0156 HHCP-SVS OF AIDE,EA 15 MIN: HCPCS

## 2024-04-25 ENCOUNTER — HOME CARE VISIT (OUTPATIENT)
Dept: HOME HEALTH SERVICES | Facility: HOME HEALTHCARE | Age: 89
End: 2024-04-25
Payer: MEDICARE

## 2024-04-25 ENCOUNTER — HOME CARE VISIT (OUTPATIENT)
Dept: HOME HOSPICE | Facility: HOSPICE | Age: 89
End: 2024-04-25
Payer: MEDICARE

## 2024-04-25 PROCEDURE — 10330064

## 2024-04-25 PROCEDURE — G0299 HHS/HOSPICE OF RN EA 15 MIN: HCPCS

## 2024-04-25 PROCEDURE — 10330064 BANDAGE, CNFRM 4"X4.1YDS N/S LF (12RL/BG

## 2024-04-25 PROCEDURE — 10330064 SPONGE, GAUZE 8PLY N/S 4"X4" (200/PK 20P

## 2024-04-25 PROCEDURE — 10330064 TAPE, ADHSV TRANSPORE WHT 1" (12RL/BX 10

## 2024-04-25 PROCEDURE — 10330064 PAD, ABD 5X9" STR LF (1/PK 20PK/BX) MGM1

## 2024-04-26 ENCOUNTER — HOME CARE VISIT (OUTPATIENT)
Dept: HOME HEALTH SERVICES | Facility: HOME HEALTHCARE | Age: 89
End: 2024-04-26
Payer: MEDICARE

## 2024-04-26 PROCEDURE — 10330057 MEDICATION, GENERAL

## 2024-04-26 PROCEDURE — G0155 HHCP-SVS OF CSW,EA 15 MIN: HCPCS

## 2024-04-26 PROCEDURE — G0156 HHCP-SVS OF AIDE,EA 15 MIN: HCPCS

## 2024-04-27 ENCOUNTER — HOME CARE VISIT (OUTPATIENT)
Dept: HOME HEALTH SERVICES | Facility: HOME HEALTHCARE | Age: 89
End: 2024-04-27
Payer: MEDICARE

## 2024-04-27 PROCEDURE — G0156 HHCP-SVS OF AIDE,EA 15 MIN: HCPCS

## 2024-04-29 ENCOUNTER — HOME CARE VISIT (OUTPATIENT)
Dept: HOME HOSPICE | Facility: HOSPICE | Age: 89
End: 2024-04-29
Payer: MEDICARE

## 2024-04-29 ENCOUNTER — HOME CARE VISIT (OUTPATIENT)
Dept: HOME HEALTH SERVICES | Facility: HOME HEALTHCARE | Age: 89
End: 2024-04-29
Payer: MEDICARE

## 2024-04-29 VITALS
TEMPERATURE: 97.8 F | DIASTOLIC BLOOD PRESSURE: 72 MMHG | HEART RATE: 94 BPM | RESPIRATION RATE: 18 BRPM | SYSTOLIC BLOOD PRESSURE: 136 MMHG

## 2024-04-29 PROCEDURE — 10330064 DRESSING, HYDROCELLULAR ADH STR FOAM 4"X

## 2024-04-29 PROCEDURE — G0299 HHS/HOSPICE OF RN EA 15 MIN: HCPCS

## 2024-04-29 PROCEDURE — G0156 HHCP-SVS OF AIDE,EA 15 MIN: HCPCS

## 2024-04-29 RX ADMIN — ALPRAZOLAM 0.25 MG: 0.25 TABLET ORAL at 11:15

## 2024-04-29 RX ADMIN — HYDROMORPHONE HYDROCHLORIDE 6 MG: 2 TABLET ORAL at 11:15

## 2024-04-30 ENCOUNTER — APPOINTMENT (OUTPATIENT)
Dept: LAB | Facility: HOSPITAL | Age: 89
End: 2024-04-30
Payer: MEDICARE

## 2024-04-30 ENCOUNTER — HOME CARE VISIT (OUTPATIENT)
Dept: HOME HEALTH SERVICES | Facility: HOME HEALTHCARE | Age: 89
End: 2024-04-30
Payer: MEDICARE

## 2024-04-30 DIAGNOSIS — E78.5 DYSLIPIDEMIA: ICD-10-CM

## 2024-04-30 DIAGNOSIS — M48.02 CERVICAL STENOSIS OF SPINAL CANAL: ICD-10-CM

## 2024-04-30 DIAGNOSIS — N18.4 CHRONIC RENAL DISEASE, STAGE IV (HCC): ICD-10-CM

## 2024-04-30 DIAGNOSIS — F41.9 ANXIETY: ICD-10-CM

## 2024-04-30 DIAGNOSIS — I10 BENIGN ESSENTIAL HYPERTENSION: ICD-10-CM

## 2024-04-30 DIAGNOSIS — I48.0 PAROXYSMAL ATRIAL FIBRILLATION (HCC): ICD-10-CM

## 2024-04-30 DIAGNOSIS — N18.30 STAGE 3 CHRONIC KIDNEY DISEASE, UNSPECIFIED WHETHER STAGE 3A OR 3B CKD (HCC): ICD-10-CM

## 2024-04-30 DIAGNOSIS — E03.9 HYPOTHYROIDISM, UNSPECIFIED TYPE: ICD-10-CM

## 2024-04-30 DIAGNOSIS — E87.6 HYPOKALEMIA: ICD-10-CM

## 2024-04-30 DIAGNOSIS — J44.9 CHRONIC OBSTRUCTIVE PULMONARY DISEASE, UNSPECIFIED COPD TYPE (HCC): ICD-10-CM

## 2024-04-30 DIAGNOSIS — I89.0 LYMPHEDEMA: ICD-10-CM

## 2024-04-30 LAB
CHOLEST SERPL-MCNC: 179 MG/DL
HDLC SERPL-MCNC: 41 MG/DL
LDLC SERPL CALC-MCNC: 112 MG/DL (ref 0–100)
NONHDLC SERPL-MCNC: 138 MG/DL
TRIGL SERPL-MCNC: 131 MG/DL
TSH SERPL DL<=0.05 MIU/L-ACNC: 143.72 UIU/ML (ref 0.45–4.5)

## 2024-04-30 PROCEDURE — 84443 ASSAY THYROID STIM HORMONE: CPT

## 2024-04-30 PROCEDURE — 85007 BL SMEAR W/DIFF WBC COUNT: CPT

## 2024-04-30 PROCEDURE — 36415 COLL VENOUS BLD VENIPUNCTURE: CPT

## 2024-04-30 PROCEDURE — 85027 COMPLETE CBC AUTOMATED: CPT

## 2024-04-30 PROCEDURE — 80061 LIPID PANEL: CPT

## 2024-04-30 PROCEDURE — 80053 COMPREHEN METABOLIC PANEL: CPT

## 2024-04-30 PROCEDURE — G0156 HHCP-SVS OF AIDE,EA 15 MIN: HCPCS

## 2024-05-01 ENCOUNTER — HOME CARE VISIT (OUTPATIENT)
Dept: HOME HEALTH SERVICES | Facility: HOME HEALTHCARE | Age: 89
End: 2024-05-01
Payer: MEDICARE

## 2024-05-01 LAB
ANISOCYTOSIS BLD QL SMEAR: PRESENT
BASOPHILS # BLD MANUAL: 0 THOUSAND/UL (ref 0–0.1)
BASOPHILS NFR MAR MANUAL: 0 % (ref 0–1)
DIFFERENTIAL COMMENT: ABNORMAL
EOSINOPHIL # BLD MANUAL: 0 THOUSAND/UL (ref 0–0.4)
EOSINOPHIL NFR BLD MANUAL: 0 % (ref 0–6)
ERYTHROCYTE [DISTWIDTH] IN BLOOD BY AUTOMATED COUNT: 14.9 % (ref 11.6–15.1)
HCT VFR BLD AUTO: 30.4 % (ref 34.8–46.1)
HGB BLD-MCNC: 9.1 G/DL (ref 11.5–15.4)
LYMPHOCYTES # BLD AUTO: 12.84 THOUSAND/UL (ref 0.6–4.47)
LYMPHOCYTES # BLD AUTO: 51 % (ref 14–44)
MACROCYTES BLD QL AUTO: PRESENT
MCH RBC QN AUTO: 28.3 PG (ref 26.8–34.3)
MCHC RBC AUTO-ENTMCNC: 29.9 G/DL (ref 31.4–37.4)
MCV RBC AUTO: 94 FL (ref 82–98)
MONOCYTES # BLD AUTO: 0.19 THOUSAND/UL (ref 0–1.22)
MONOCYTES NFR BLD: 1 % (ref 4–12)
NEUTROPHILS # BLD MANUAL: 6.13 THOUSAND/UL (ref 1.85–7.62)
NEUTS SEG NFR BLD AUTO: 32 % (ref 43–75)
PLATELET # BLD AUTO: 367 THOUSANDS/UL (ref 149–390)
PLATELET BLD QL SMEAR: ADEQUATE
PMV BLD AUTO: 9.9 FL (ref 8.9–12.7)
POLYCHROMASIA BLD QL SMEAR: PRESENT
RBC # BLD AUTO: 3.22 MILLION/UL (ref 3.81–5.12)
RBC MORPH BLD: PRESENT
SMUDGE CELLS BLD QL SMEAR: PRESENT
VARIANT LYMPHS # BLD AUTO: 16 %
WBC # BLD AUTO: 19.16 THOUSAND/UL (ref 4.31–10.16)

## 2024-05-01 PROCEDURE — G0156 HHCP-SVS OF AIDE,EA 15 MIN: HCPCS

## 2024-05-02 ENCOUNTER — HOME CARE VISIT (OUTPATIENT)
Dept: HOME HEALTH SERVICES | Facility: HOME HEALTHCARE | Age: 89
End: 2024-05-02
Payer: MEDICARE

## 2024-05-02 VITALS — HEART RATE: 96 BPM | TEMPERATURE: 98.2 F | RESPIRATION RATE: 18 BRPM

## 2024-05-02 PROCEDURE — G0299 HHS/HOSPICE OF RN EA 15 MIN: HCPCS

## 2024-05-03 ENCOUNTER — HOME CARE VISIT (OUTPATIENT)
Dept: HOME HEALTH SERVICES | Facility: HOME HEALTHCARE | Age: 89
End: 2024-05-03
Payer: MEDICARE

## 2024-05-03 PROCEDURE — G0156 HHCP-SVS OF AIDE,EA 15 MIN: HCPCS

## 2024-05-04 ENCOUNTER — HOME CARE VISIT (OUTPATIENT)
Dept: HOME HEALTH SERVICES | Facility: HOME HEALTHCARE | Age: 89
End: 2024-05-04
Payer: MEDICARE

## 2024-05-04 PROCEDURE — G0156 HHCP-SVS OF AIDE,EA 15 MIN: HCPCS

## 2024-05-06 ENCOUNTER — HOME CARE VISIT (OUTPATIENT)
Dept: HOME HEALTH SERVICES | Facility: HOME HEALTHCARE | Age: 89
End: 2024-05-06
Payer: MEDICARE

## 2024-05-06 ENCOUNTER — HOME CARE VISIT (OUTPATIENT)
Dept: HOME HOSPICE | Facility: HOSPICE | Age: 89
End: 2024-05-06
Payer: MEDICARE

## 2024-05-06 VITALS
HEART RATE: 59 BPM | SYSTOLIC BLOOD PRESSURE: 148 MMHG | TEMPERATURE: 98.2 F | DIASTOLIC BLOOD PRESSURE: 68 MMHG | RESPIRATION RATE: 18 BRPM

## 2024-05-06 PROCEDURE — G0156 HHCP-SVS OF AIDE,EA 15 MIN: HCPCS

## 2024-05-06 PROCEDURE — G0299 HHS/HOSPICE OF RN EA 15 MIN: HCPCS

## 2024-05-08 ENCOUNTER — HOME CARE VISIT (OUTPATIENT)
Dept: HOME HEALTH SERVICES | Facility: HOME HEALTHCARE | Age: 89
End: 2024-05-08
Payer: MEDICARE

## 2024-05-08 LAB
ALBUMIN SERPL BCP-MCNC: 3 G/DL (ref 3.5–5)
ALP SERPL-CCNC: 69 U/L (ref 34–104)
ALT SERPL W P-5'-P-CCNC: 20 U/L (ref 7–52)
ANION GAP SERPL CALCULATED.3IONS-SCNC: 10 MMOL/L (ref 4–13)
AST SERPL W P-5'-P-CCNC: 37 U/L (ref 13–39)
BILIRUB SERPL-MCNC: 0.29 MG/DL (ref 0.2–1)
BUN SERPL-MCNC: 19 MG/DL (ref 5–25)
CALCIUM ALBUM COR SERPL-MCNC: 9.3 MG/DL (ref 8.3–10.1)
CALCIUM SERPL-MCNC: 8.5 MG/DL (ref 8.4–10.2)
CHLORIDE SERPL-SCNC: 95 MMOL/L (ref 96–108)
CO2 SERPL-SCNC: 30 MMOL/L (ref 21–32)
CREAT SERPL-MCNC: 1.38 MG/DL (ref 0.6–1.3)
GFR SERPL CREATININE-BSD FRML MDRD: 33 ML/MIN/1.73SQ M
GLUCOSE P FAST SERPL-MCNC: 90 MG/DL (ref 65–99)
POTASSIUM SERPL-SCNC: 3.9 MMOL/L (ref 3.5–5.3)
PROT SERPL-MCNC: 5.6 G/DL (ref 6.4–8.4)
SODIUM SERPL-SCNC: 135 MMOL/L (ref 135–147)

## 2024-05-08 PROCEDURE — G0156 HHCP-SVS OF AIDE,EA 15 MIN: HCPCS

## 2024-05-09 ENCOUNTER — HOME CARE VISIT (OUTPATIENT)
Dept: HOME HEALTH SERVICES | Facility: HOME HEALTHCARE | Age: 89
End: 2024-05-09
Payer: MEDICARE

## 2024-05-09 VITALS — RESPIRATION RATE: 16 BRPM

## 2024-05-09 PROCEDURE — G0156 HHCP-SVS OF AIDE,EA 15 MIN: HCPCS

## 2024-05-09 PROCEDURE — G0299 HHS/HOSPICE OF RN EA 15 MIN: HCPCS

## 2024-05-10 ENCOUNTER — HOME CARE VISIT (OUTPATIENT)
Dept: HOME HEALTH SERVICES | Facility: HOME HEALTHCARE | Age: 89
End: 2024-05-10
Payer: MEDICARE

## 2024-05-10 PROCEDURE — G0156 HHCP-SVS OF AIDE,EA 15 MIN: HCPCS

## 2024-05-13 ENCOUNTER — HOME CARE VISIT (OUTPATIENT)
Dept: HOME HEALTH SERVICES | Facility: HOME HEALTHCARE | Age: 89
End: 2024-05-13
Payer: MEDICARE

## 2024-05-13 PROCEDURE — G0156 HHCP-SVS OF AIDE,EA 15 MIN: HCPCS

## 2024-05-14 ENCOUNTER — HOME CARE VISIT (OUTPATIENT)
Dept: HOME HEALTH SERVICES | Facility: HOME HEALTHCARE | Age: 89
End: 2024-05-14
Payer: MEDICARE

## 2024-05-14 VITALS — RESPIRATION RATE: 17 BRPM | TEMPERATURE: 99.1 F | HEART RATE: 109 BPM

## 2024-05-14 PROCEDURE — G0156 HHCP-SVS OF AIDE,EA 15 MIN: HCPCS

## 2024-05-14 PROCEDURE — G0299 HHS/HOSPICE OF RN EA 15 MIN: HCPCS

## 2024-05-14 RX ADMIN — HYDROMORPHONE HYDROCHLORIDE 6 MG: 2 TABLET ORAL at 09:00

## 2024-05-14 RX ADMIN — ALPRAZOLAM 0.25 MG: 0.25 TABLET ORAL at 09:00

## 2024-05-15 ENCOUNTER — HOME CARE VISIT (OUTPATIENT)
Dept: HOME HEALTH SERVICES | Facility: HOME HEALTHCARE | Age: 89
End: 2024-05-15
Payer: MEDICARE

## 2024-05-15 PROCEDURE — G0156 HHCP-SVS OF AIDE,EA 15 MIN: HCPCS

## 2024-05-16 ENCOUNTER — HOME CARE VISIT (OUTPATIENT)
Dept: HOME HEALTH SERVICES | Facility: HOME HEALTHCARE | Age: 89
End: 2024-05-16
Payer: MEDICARE

## 2024-05-16 VITALS — RESPIRATION RATE: 17 BRPM

## 2024-05-16 PROCEDURE — G0299 HHS/HOSPICE OF RN EA 15 MIN: HCPCS

## 2024-05-16 PROCEDURE — G0156 HHCP-SVS OF AIDE,EA 15 MIN: HCPCS

## 2024-05-17 ENCOUNTER — HOME CARE VISIT (OUTPATIENT)
Dept: HOME HEALTH SERVICES | Facility: HOME HEALTHCARE | Age: 89
End: 2024-05-17
Payer: MEDICARE

## 2024-05-17 PROCEDURE — G0156 HHCP-SVS OF AIDE,EA 15 MIN: HCPCS

## 2024-05-20 ENCOUNTER — HOME CARE VISIT (OUTPATIENT)
Dept: HOME HOSPICE | Facility: HOSPICE | Age: 89
End: 2024-05-20
Payer: MEDICARE

## 2024-05-20 ENCOUNTER — HOME CARE VISIT (OUTPATIENT)
Dept: HOME HEALTH SERVICES | Facility: HOME HEALTHCARE | Age: 89
End: 2024-05-20
Payer: MEDICARE

## 2024-05-20 VITALS
SYSTOLIC BLOOD PRESSURE: 128 MMHG | DIASTOLIC BLOOD PRESSURE: 80 MMHG | TEMPERATURE: 97.8 F | HEART RATE: 82 BPM | RESPIRATION RATE: 19 BRPM

## 2024-05-20 PROCEDURE — G0156 HHCP-SVS OF AIDE,EA 15 MIN: HCPCS

## 2024-05-20 PROCEDURE — G0299 HHS/HOSPICE OF RN EA 15 MIN: HCPCS

## 2024-05-21 ENCOUNTER — HOME CARE VISIT (OUTPATIENT)
Dept: HOME HEALTH SERVICES | Facility: HOME HEALTHCARE | Age: 89
End: 2024-05-21
Payer: MEDICARE

## 2024-05-21 PROCEDURE — G0156 HHCP-SVS OF AIDE,EA 15 MIN: HCPCS

## 2024-05-22 ENCOUNTER — HOME CARE VISIT (OUTPATIENT)
Dept: HOME HOSPICE | Facility: HOSPICE | Age: 89
End: 2024-05-22
Payer: MEDICARE

## 2024-05-22 ENCOUNTER — HOME CARE VISIT (OUTPATIENT)
Dept: HOME HEALTH SERVICES | Facility: HOME HEALTHCARE | Age: 89
End: 2024-05-22
Payer: MEDICARE

## 2024-05-22 VITALS — RESPIRATION RATE: 18 BRPM

## 2024-05-22 DIAGNOSIS — Z51.5 HOSPICE CARE PATIENT: Primary | ICD-10-CM

## 2024-05-22 PROCEDURE — G0156 HHCP-SVS OF AIDE,EA 15 MIN: HCPCS

## 2024-05-22 PROCEDURE — G0299 HHS/HOSPICE OF RN EA 15 MIN: HCPCS

## 2024-05-22 PROCEDURE — 10330063 VN DURABLE MEDICAL EQUIPMENT, SUPPLIES/MEDS

## 2024-05-23 ENCOUNTER — HOME CARE VISIT (OUTPATIENT)
Dept: HOME HOSPICE | Facility: HOSPICE | Age: 89
End: 2024-05-23
Payer: MEDICARE

## 2024-05-23 ENCOUNTER — HOME CARE VISIT (OUTPATIENT)
Dept: HOME HEALTH SERVICES | Facility: HOME HEALTHCARE | Age: 89
End: 2024-05-23
Payer: MEDICARE

## 2024-05-23 VITALS — TEMPERATURE: 98.2 F | HEART RATE: 114 BPM | RESPIRATION RATE: 17 BRPM

## 2024-05-23 DIAGNOSIS — Z51.5 HOSPICE CARE PATIENT: Primary | ICD-10-CM

## 2024-05-23 PROCEDURE — G0299 HHS/HOSPICE OF RN EA 15 MIN: HCPCS

## 2024-05-23 PROCEDURE — G0156 HHCP-SVS OF AIDE,EA 15 MIN: HCPCS

## 2024-05-23 NOTE — TELEPHONE ENCOUNTER
5/23/2024 10:10 AM  [facility] home patient requests PCA settings adjusted for improved symptom management.  Filled electronically via Epic as per [place] State Law.    Requested Prescriptions     Signed Prescriptions Disp Refills    HYDROmorphone (Dilaudid) 2 mg/mL 100 mL PCA (Home Health only)       Sig: Route of Administration: Subcutaneous;  Basal Rate: 0.4 mg/hr;  Bolus Dose: 0.8 mg;  Bolus Interval: 10 minutes;  Max Bolus Doses/hr: 6     Authorizing Provider: NICCI WHITAKER    HYDROmorphone (Dilaudid) 2 mg/mL 100 mL PCA (Home Health only) 100 mL 0     Sig: Route of Administration: Subcutaneous;  CLINICIAN BOLUS 1mg - ONE TIME NOW     Authorizing Provider: NICCI WHITAKER CRNP  Formerly [facility] Nurse [facility]  Hospice Answering Service: [phone]  You can find me on [employer]!

## 2024-05-24 ENCOUNTER — HOME CARE VISIT (OUTPATIENT)
Dept: HOME HEALTH SERVICES | Facility: HOME HEALTHCARE | Age: 89
End: 2024-05-24
Payer: MEDICARE

## 2024-05-24 PROCEDURE — G0156 HHCP-SVS OF AIDE,EA 15 MIN: HCPCS

## 2024-05-25 ENCOUNTER — HOME CARE VISIT (OUTPATIENT)
Dept: HOME HEALTH SERVICES | Facility: HOME HEALTHCARE | Age: 89
End: 2024-05-25
Payer: MEDICARE

## 2024-05-25 PROCEDURE — G0299 HHS/HOSPICE OF RN EA 15 MIN: HCPCS

## 2024-05-27 ENCOUNTER — HOME CARE VISIT (OUTPATIENT)
Dept: HOME HEALTH SERVICES | Facility: HOME HEALTHCARE | Age: 89
End: 2024-05-27
Payer: MEDICARE

## 2024-05-27 VITALS
TEMPERATURE: 96.7 F | RESPIRATION RATE: 10 BRPM | HEART RATE: 94 BPM | DIASTOLIC BLOOD PRESSURE: 65 MMHG | SYSTOLIC BLOOD PRESSURE: 99 MMHG

## 2024-05-27 PROCEDURE — G0299 HHS/HOSPICE OF RN EA 15 MIN: HCPCS

## 2024-05-27 PROCEDURE — G0156 HHCP-SVS OF AIDE,EA 15 MIN: HCPCS

## 2024-05-28 ENCOUNTER — HOME CARE VISIT (OUTPATIENT)
Dept: HOME HEALTH SERVICES | Facility: HOME HEALTHCARE | Age: 89
End: 2024-05-28
Payer: MEDICARE

## 2024-05-28 VITALS — TEMPERATURE: 97.3 F | RESPIRATION RATE: 17 BRPM | HEART RATE: 71 BPM

## 2024-05-28 PROCEDURE — G0156 HHCP-SVS OF AIDE,EA 15 MIN: HCPCS

## 2024-05-28 PROCEDURE — G0299 HHS/HOSPICE OF RN EA 15 MIN: HCPCS

## 2024-05-29 ENCOUNTER — HOME CARE VISIT (OUTPATIENT)
Dept: HOME HEALTH SERVICES | Facility: HOME HEALTHCARE | Age: 89
End: 2024-05-29
Payer: MEDICARE

## 2024-05-29 ENCOUNTER — HOME CARE VISIT (OUTPATIENT)
Dept: HOME HOSPICE | Facility: HOSPICE | Age: 89
End: 2024-05-29
Payer: MEDICARE

## 2024-05-29 PROCEDURE — G0156 HHCP-SVS OF AIDE,EA 15 MIN: HCPCS

## 2024-05-29 PROCEDURE — G0155 HHCP-SVS OF CSW,EA 15 MIN: HCPCS

## 2024-05-30 ENCOUNTER — HOME CARE VISIT (OUTPATIENT)
Dept: HOME HEALTH SERVICES | Facility: HOME HEALTHCARE | Age: 89
End: 2024-05-30
Payer: MEDICARE

## 2024-05-30 VITALS — RESPIRATION RATE: 16 BRPM | HEART RATE: 92 BPM | TEMPERATURE: 97.7 F

## 2024-05-30 PROCEDURE — G0299 HHS/HOSPICE OF RN EA 15 MIN: HCPCS

## 2024-05-30 PROCEDURE — G0156 HHCP-SVS OF AIDE,EA 15 MIN: HCPCS

## 2024-05-30 PROCEDURE — 10330063 VN DURABLE MEDICAL EQUIPMENT, SUPPLIES/MEDS

## 2024-05-31 ENCOUNTER — HOME CARE VISIT (OUTPATIENT)
Dept: HOME HEALTH SERVICES | Facility: HOME HEALTHCARE | Age: 89
End: 2024-05-31
Payer: MEDICARE

## 2024-05-31 PROCEDURE — G0156 HHCP-SVS OF AIDE,EA 15 MIN: HCPCS

## 2024-06-01 ENCOUNTER — HOME CARE VISIT (OUTPATIENT)
Dept: HOME HEALTH SERVICES | Facility: HOME HEALTHCARE | Age: 89
End: 2024-06-01
Payer: MEDICARE

## 2024-06-01 VITALS — RESPIRATION RATE: 6 BRPM | HEART RATE: 88 BPM | TEMPERATURE: 97.2 F

## 2024-06-01 PROCEDURE — G0299 HHS/HOSPICE OF RN EA 15 MIN: HCPCS

## 2024-06-05 ENCOUNTER — HOME CARE VISIT (OUTPATIENT)
Dept: HOME HOSPICE | Facility: HOSPICE | Age: 89
End: 2024-06-05
Payer: MEDICARE

## 2024-06-06 ENCOUNTER — HOME CARE VISIT (OUTPATIENT)
Dept: HOME HOSPICE | Facility: HOSPICE | Age: 89
End: 2024-06-06
Payer: MEDICARE